# Patient Record
Sex: FEMALE | Race: WHITE | NOT HISPANIC OR LATINO | URBAN - METROPOLITAN AREA
[De-identification: names, ages, dates, MRNs, and addresses within clinical notes are randomized per-mention and may not be internally consistent; named-entity substitution may affect disease eponyms.]

---

## 2017-01-14 ENCOUNTER — INPATIENT (INPATIENT)
Facility: HOSPITAL | Age: 72
LOS: 3 days | Discharge: HOME | DRG: 291 | End: 2017-01-18
Attending: FAMILY MEDICINE | Admitting: HOSPITALIST
Payer: MEDICARE

## 2017-01-14 VITALS
SYSTOLIC BLOOD PRESSURE: 170 MMHG | RESPIRATION RATE: 18 BRPM | OXYGEN SATURATION: 95 % | DIASTOLIC BLOOD PRESSURE: 80 MMHG | TEMPERATURE: 98 F | HEART RATE: 64 BPM

## 2017-01-14 DIAGNOSIS — I50.9 HEART FAILURE, UNSPECIFIED: ICD-10-CM

## 2017-01-14 DIAGNOSIS — Z95.810 PRESENCE OF AUTOMATIC (IMPLANTABLE) CARDIAC DEFIBRILLATOR: Chronic | ICD-10-CM

## 2017-01-14 DIAGNOSIS — Z98.89 OTHER SPECIFIED POSTPROCEDURAL STATES: Chronic | ICD-10-CM

## 2017-01-14 LAB
ALBUMIN SERPL ELPH-MCNC: 4 G/DL — SIGNIFICANT CHANGE UP (ref 3.3–5.2)
ALP SERPL-CCNC: 58 U/L — SIGNIFICANT CHANGE UP (ref 40–120)
ALT FLD-CCNC: 19 U/L — SIGNIFICANT CHANGE UP
ANION GAP SERPL CALC-SCNC: 14 MMOL/L — SIGNIFICANT CHANGE UP (ref 5–17)
APTT BLD: 32 SEC — SIGNIFICANT CHANGE UP (ref 27.5–37.4)
AST SERPL-CCNC: 23 U/L — SIGNIFICANT CHANGE UP
BASOPHILS # BLD AUTO: 0.1 K/UL — SIGNIFICANT CHANGE UP (ref 0–0.2)
BASOPHILS NFR BLD AUTO: 0.9 % — SIGNIFICANT CHANGE UP (ref 0–2)
BILIRUB SERPL-MCNC: 0.5 MG/DL — SIGNIFICANT CHANGE UP (ref 0.4–2)
BUN SERPL-MCNC: 17 MG/DL — SIGNIFICANT CHANGE UP (ref 8–20)
CALCIUM SERPL-MCNC: 9.2 MG/DL — SIGNIFICANT CHANGE UP (ref 8.6–10.2)
CHLORIDE SERPL-SCNC: 102 MMOL/L — SIGNIFICANT CHANGE UP (ref 98–107)
CK SERPL-CCNC: 75 U/L — SIGNIFICANT CHANGE UP (ref 25–170)
CO2 SERPL-SCNC: 26 MMOL/L — SIGNIFICANT CHANGE UP (ref 22–29)
CREAT SERPL-MCNC: 0.98 MG/DL — SIGNIFICANT CHANGE UP (ref 0.5–1.3)
EOSINOPHIL # BLD AUTO: 0 K/UL — SIGNIFICANT CHANGE UP (ref 0–0.5)
EOSINOPHIL NFR BLD AUTO: 0.6 % — SIGNIFICANT CHANGE UP (ref 0–6)
GLUCOSE SERPL-MCNC: 99 MG/DL — SIGNIFICANT CHANGE UP (ref 70–115)
HCT VFR BLD CALC: 27.6 % — LOW (ref 37–47)
HGB BLD-MCNC: 8.6 G/DL — LOW (ref 12–16)
INR BLD: 1.1 RATIO — SIGNIFICANT CHANGE UP (ref 0.88–1.16)
LYMPHOCYTES # BLD AUTO: 2.3 K/UL — SIGNIFICANT CHANGE UP (ref 1–4.8)
LYMPHOCYTES # BLD AUTO: 36.1 % — SIGNIFICANT CHANGE UP (ref 20–55)
MCHC RBC-ENTMCNC: 24.4 PG — LOW (ref 27–31)
MCHC RBC-ENTMCNC: 31.2 G/DL — LOW (ref 32–36)
MCV RBC AUTO: 78.4 FL — LOW (ref 81–99)
MONOCYTES # BLD AUTO: 1.1 K/UL — HIGH (ref 0–0.8)
MONOCYTES NFR BLD AUTO: 17 % — HIGH (ref 3–10)
NEUTROPHILS # BLD AUTO: 2.9 K/UL — SIGNIFICANT CHANGE UP (ref 1.8–8)
NEUTROPHILS NFR BLD AUTO: 45.2 % — SIGNIFICANT CHANGE UP (ref 37–73)
NT-PROBNP SERPL-SCNC: 2702 PG/ML — HIGH (ref 0–300)
PLATELET # BLD AUTO: 396 K/UL — SIGNIFICANT CHANGE UP (ref 150–400)
POTASSIUM SERPL-MCNC: 4 MMOL/L — SIGNIFICANT CHANGE UP (ref 3.5–5.3)
POTASSIUM SERPL-SCNC: 4 MMOL/L — SIGNIFICANT CHANGE UP (ref 3.5–5.3)
PROT SERPL-MCNC: 6.9 G/DL — SIGNIFICANT CHANGE UP (ref 6.6–8.7)
PROTHROM AB SERPL-ACNC: 12.1 SEC — SIGNIFICANT CHANGE UP (ref 10–13.1)
RBC # BLD: 3.52 M/UL — LOW (ref 4.4–5.2)
RBC # FLD: 17.3 % — HIGH (ref 11–15.6)
SODIUM SERPL-SCNC: 142 MMOL/L — SIGNIFICANT CHANGE UP (ref 135–145)
TROPONIN T SERPL-MCNC: <0.01 NG/ML — SIGNIFICANT CHANGE UP (ref 0–0.06)
WBC # BLD: 6.48 K/UL — SIGNIFICANT CHANGE UP (ref 4.8–10.8)
WBC # FLD AUTO: 6.48 K/UL — SIGNIFICANT CHANGE UP (ref 4.8–10.8)

## 2017-01-14 PROCEDURE — 93010 ELECTROCARDIOGRAM REPORT: CPT

## 2017-01-14 PROCEDURE — 99285 EMERGENCY DEPT VISIT HI MDM: CPT

## 2017-01-14 PROCEDURE — 99223 1ST HOSP IP/OBS HIGH 75: CPT

## 2017-01-14 RX ORDER — NITROGLYCERIN 6.5 MG
1 CAPSULE, EXTENDED RELEASE ORAL ONCE
Qty: 0 | Refills: 0 | Status: COMPLETED | OUTPATIENT
Start: 2017-01-14 | End: 2017-01-14

## 2017-01-14 RX ORDER — FUROSEMIDE 40 MG
80 TABLET ORAL ONCE
Qty: 0 | Refills: 0 | Status: COMPLETED | OUTPATIENT
Start: 2017-01-14 | End: 2017-01-14

## 2017-01-14 RX ORDER — SODIUM CHLORIDE 9 MG/ML
3 INJECTION INTRAMUSCULAR; INTRAVENOUS; SUBCUTANEOUS ONCE
Qty: 0 | Refills: 0 | Status: COMPLETED | OUTPATIENT
Start: 2017-01-14 | End: 2017-01-14

## 2017-01-14 RX ORDER — ACETAMINOPHEN 500 MG
650 TABLET ORAL ONCE
Qty: 0 | Refills: 0 | Status: COMPLETED | OUTPATIENT
Start: 2017-01-14 | End: 2017-01-14

## 2017-01-14 RX ADMIN — Medication 650 MILLIGRAM(S): at 17:16

## 2017-01-14 RX ADMIN — Medication 80 MILLIGRAM(S): at 19:21

## 2017-01-14 RX ADMIN — Medication 1 INCH(S): at 17:17

## 2017-01-14 RX ADMIN — SODIUM CHLORIDE 3 MILLILITER(S): 9 INJECTION INTRAMUSCULAR; INTRAVENOUS; SUBCUTANEOUS at 16:45

## 2017-01-14 NOTE — ED PROVIDER NOTE - PMH
Afib    Anemia    Cardiomyopathy    Chronic back pain    GI bleed not requiring more than 4 units of blood in 24 hours, ICU, or surgery    HLD (hyperlipidemia)    HTN (hypertension)    Hypothyroid    MI (myocardial infarction)  3 times (2000,2002)  Raynaud's disease    Scleroderma    Sleep apnea    Ventricular tachycardia

## 2017-01-14 NOTE — ED PROVIDER NOTE - DIAGNOSTIC INTERPRETATION
Outside study from urgent care brought and reviewed by ED physician. No report. Lungs are clear, PM in place, no pneumothorax, no conslidation, mild vascular congestion is present

## 2017-01-14 NOTE — H&P ADULT. - ASSESSMENT
Becky Be [id number] pmd Dr Bass Cardio consult Dr Esteves  70 y/o F w/hx NICM, CHF, s/p AICD, afib, scleroderma, OA, hypothyroidism, GERD. Presents to the ED c/o of sob that started 4 days ago. Also noticed increased leg swelling, and gain weight. approx 13 lbs. Pt states that 2 weeks ago, her lasix med was cut down from 80 to 40 mg daily. Also c/o of cp, precordial, no radiation, associated w/ exertion, no atten f. Denies palpitations, cough, n/v, abd pain, loc or any further complaints. Exam: b/l jvd, hepatojug reflex pos, b/l rales, pitting edema. Admittted for CHF exacer/cp. Started in iv lasix, f/u CE, echo, serial ekg's. Becky Be [id number] pmd Dr Bass Cardio consult Dr Esteves  72 y/o F w/hx NICM, CHF, s/p AICD, afib, anemia(baseline 9), hx gastric bypass, gi bleeding,  scleroderma, OA, hypothyroidism, GERD. Presents to the ED c/o of sob that started 4 days ago. Also noticed increased leg swelling, and gain weight. approx 13 lbs. Pt states that 2 weeks ago, her lasix med was cut down from 80 to 40 mg daily. Also c/o of cp, precordial, no radiation, associated w/ exertion, no atten f. Denies palpitations, cough, n/v, abd pain, loc or any further complaints. Exam: b/l jvd, hepatojug reflex pos, b/l rales, pitting edema. Admittted for CHF exacer/cp. Started in iv lasix, f/u CE, echo, serial ekg's.

## 2017-01-14 NOTE — H&P ADULT. - PSH
AICD (automatic cardioverter/defibrillator) present  [employer] (2006)  Bariatric surgery status  gastric bypass 2001  Hernia  , Lariat procedure (2012)  History of breast surgery  "Lift"  Spinal cord stimulator status  [employer] scientific (2014)

## 2017-01-14 NOTE — ED PROVIDER NOTE - PSH
AICD (automatic cardioverter/defibrillator) present  [employer] (2006)  Bariatric surgery status  gastric bypass 2001  Hernia  , Lariat procedure (2012)  History of breast surgery  "Lift"  Spinal cord stimulator status  [employer] scientific (2014)

## 2017-01-14 NOTE — ED PROVIDER NOTE - PROGRESS NOTE DETAILS
Feels beter. D/w [place] [facility] who recommends admission for diuresis. Will call hospitalist No call back from hospitalist, will call again Have called hospitalist multiple times, saying should be admitted to resident service btu patient is not family medicine clinic patient (is unattached) and resident service is capped. Will contact hospitalist one more time.

## 2017-01-14 NOTE — ED PROVIDER NOTE - OBJECTIVE STATEMENT
71F w/Hx of Scleroderma, COPD, CHF, Afib not on AC or antiplatelet agents 2/2 GIB presents for SOB x2-3d with orthopnea and PNDA. No pain. No fevers, no chills. Went to urgent care given back to back nebs without improvement. +SUAREZ. Of note, recently taken off ramipril and decrased coreg in order to uptitrate her calcium channel blockers to improve ranaud's symptoms. No pain of any sort. +Symmetrical bilateral LE swellign in the past week. Has been told strong contraindications to anticoagulants and antiplatelet agents 2/2 GIBs.    Cardiologist: Hope Trimble of [facility]

## 2017-01-14 NOTE — ED ADULT NURSE NOTE - OBJECTIVE STATEMENT
late entery : Pt presented to Ed c/o increased SOB started a week ago , denies chest pain , recent weight gain over a week ,

## 2017-01-14 NOTE — H&P ADULT. - PROBLEM SELECTOR PLAN 3
hx gastric bypass, thereafter hx of gi bleeding.  pt baseline is 9..had colonscopy 5 yrs ago  (wnl as per pt), and endoscopy.  f/u h/h

## 2017-01-14 NOTE — H&P ADULT. - RS GEN PE MLT RESP DETAILS PC
no rhonchi/no chest wall tenderness/no intercostal retractions/airway patent/breath sounds equal/rales

## 2017-01-14 NOTE — ED PROVIDER NOTE - MEDICAL DECISION MAKING DETAILS
Likely CHF exacerbation, will give nitro, lasxi, suspect will require admission but will discuss care with [facility] cardiology. Suspect may have decompensated by changing beta blockers and ACEi. Given unresposniveness to nebs, low suspicion for acute CHF but will likely need her normal maint treatments.

## 2017-01-14 NOTE — H&P ADULT. - HISTORY OF PRESENT ILLNESS
72 y/o F w/hx NICM, CHF, s/p AICD, afib, scleroderma, OA, hypothyroidism, GERD. Presents to the ED c/o of sob that started 4 days ago. Also noticed increased leg swelling, and gain weight. approx 13 lbs. Pt states that 2 weeks ago, her lasix med was cut down from 80 to 40 mg daily. Also c/o of cp, precordial, no radiation, associated w/ exertion, no atten f. Denies palpitations, cough, n/v, abd pain, loc or any further complaints. 70 y/o F w/hx NICM, CHF, s/p AICD, afib, anemia(baseline 9), hx gastric bypass, gi bleeding,  scleroderma, OA, hypothyroidism, GERD. Presents to the ED c/o of sob that started 4 days ago. Also noticed increased leg swelling, and gain weight. approx 13 lbs. Pt states that 2 weeks ago, her lasix med was cut down from 80 to 40 mg daily. Also c/o of cp, precordial, no radiation, associated w/ exertion, no atten f. Denies palpitations, cough, n/v, abd pain, loc or any further complaints.

## 2017-01-14 NOTE — ED PROVIDER NOTE - PHYSICAL EXAMINATION
GEN: AOx4, alert, mild respiratory distress, no home O2 but on 2L nasal canula  HEENT: NCAT, anicteric sclerae , MMM, marked JVD to mastoids bilatearlly, neck supple  LUNGS: bibasilar crackles, mild tachypnea, speaking in full sentences, no accessory muscle use, unable to breath well when laying flat  CV: normal rate, irregular rhythm, 2+ distal pulses  ABD: soft, nontender  : no CVAT  MSK: b/l, symmetric 2+ calf edema, nontender  NEURO: normal mentation  PSYCH: appropriate affect

## 2017-01-15 ENCOUNTER — TRANSCRIPTION ENCOUNTER (OUTPATIENT)
Age: 72
End: 2017-01-15

## 2017-01-15 DIAGNOSIS — I50.9 HEART FAILURE, UNSPECIFIED: ICD-10-CM

## 2017-01-15 DIAGNOSIS — I21.4 NON-ST ELEVATION (NSTEMI) MYOCARDIAL INFARCTION: ICD-10-CM

## 2017-01-15 DIAGNOSIS — E03.9 HYPOTHYROIDISM, UNSPECIFIED: ICD-10-CM

## 2017-01-15 DIAGNOSIS — I48.91 UNSPECIFIED ATRIAL FIBRILLATION: ICD-10-CM

## 2017-01-15 DIAGNOSIS — I48.2 CHRONIC ATRIAL FIBRILLATION: ICD-10-CM

## 2017-01-15 DIAGNOSIS — K21.9 GASTRO-ESOPHAGEAL REFLUX DISEASE WITHOUT ESOPHAGITIS: ICD-10-CM

## 2017-01-15 DIAGNOSIS — I10 ESSENTIAL (PRIMARY) HYPERTENSION: ICD-10-CM

## 2017-01-15 DIAGNOSIS — M54.9 DORSALGIA, UNSPECIFIED: ICD-10-CM

## 2017-01-15 DIAGNOSIS — M34.9 SYSTEMIC SCLEROSIS, UNSPECIFIED: ICD-10-CM

## 2017-01-15 DIAGNOSIS — I73.00 RAYNAUD'S SYNDROME WITHOUT GANGRENE: ICD-10-CM

## 2017-01-15 DIAGNOSIS — R07.9 CHEST PAIN, UNSPECIFIED: ICD-10-CM

## 2017-01-15 DIAGNOSIS — E78.5 HYPERLIPIDEMIA, UNSPECIFIED: ICD-10-CM

## 2017-01-15 DIAGNOSIS — D50.9 IRON DEFICIENCY ANEMIA, UNSPECIFIED: ICD-10-CM

## 2017-01-15 PROCEDURE — 99233 SBSQ HOSP IP/OBS HIGH 50: CPT

## 2017-01-15 PROCEDURE — 93010 ELECTROCARDIOGRAM REPORT: CPT

## 2017-01-15 PROCEDURE — 93306 TTE W/DOPPLER COMPLETE: CPT | Mod: 26

## 2017-01-15 PROCEDURE — 71010: CPT | Mod: 26

## 2017-01-15 RX ORDER — MORPHINE SULFATE 50 MG/1
2 CAPSULE, EXTENDED RELEASE ORAL EVERY 6 HOURS
Qty: 0 | Refills: 0 | Status: DISCONTINUED | OUTPATIENT
Start: 2017-01-15 | End: 2017-01-18

## 2017-01-15 RX ORDER — FUROSEMIDE 40 MG
60 TABLET ORAL
Qty: 0 | Refills: 0 | COMMUNITY

## 2017-01-15 RX ORDER — CARVEDILOL PHOSPHATE 80 MG/1
6.25 CAPSULE, EXTENDED RELEASE ORAL EVERY 12 HOURS
Qty: 0 | Refills: 0 | Status: DISCONTINUED | OUTPATIENT
Start: 2017-01-15 | End: 2017-01-18

## 2017-01-15 RX ORDER — DULOXETINE HYDROCHLORIDE 30 MG/1
60 CAPSULE, DELAYED RELEASE ORAL DAILY
Qty: 0 | Refills: 0 | Status: DISCONTINUED | OUTPATIENT
Start: 2017-01-15 | End: 2017-01-18

## 2017-01-15 RX ORDER — NITROGLYCERIN 6.5 MG
0.4 CAPSULE, EXTENDED RELEASE ORAL
Qty: 0 | Refills: 0 | Status: DISCONTINUED | OUTPATIENT
Start: 2017-01-15 | End: 2017-01-18

## 2017-01-15 RX ORDER — PANTOPRAZOLE SODIUM 20 MG/1
40 TABLET, DELAYED RELEASE ORAL
Qty: 0 | Refills: 0 | Status: DISCONTINUED | OUTPATIENT
Start: 2017-01-15 | End: 2017-01-18

## 2017-01-15 RX ORDER — DOCUSATE SODIUM 100 MG
200 CAPSULE ORAL DAILY
Qty: 0 | Refills: 0 | Status: DISCONTINUED | OUTPATIENT
Start: 2017-01-15 | End: 2017-01-18

## 2017-01-15 RX ORDER — FUROSEMIDE 40 MG
40 TABLET ORAL EVERY 12 HOURS
Qty: 0 | Refills: 0 | Status: DISCONTINUED | OUTPATIENT
Start: 2017-01-15 | End: 2017-01-18

## 2017-01-15 RX ORDER — FUROSEMIDE 40 MG
0 TABLET ORAL
Qty: 0 | Refills: 0 | COMMUNITY

## 2017-01-15 RX ORDER — CARVEDILOL PHOSPHATE 80 MG/1
6.25 CAPSULE, EXTENDED RELEASE ORAL
Qty: 0 | Refills: 0 | COMMUNITY

## 2017-01-15 RX ORDER — LEVOTHYROXINE SODIUM 125 MCG
100 TABLET ORAL DAILY
Qty: 0 | Refills: 0 | Status: DISCONTINUED | OUTPATIENT
Start: 2017-01-15 | End: 2017-01-18

## 2017-01-15 RX ORDER — ACETAMINOPHEN 500 MG
650 TABLET ORAL ONCE
Qty: 0 | Refills: 0 | Status: COMPLETED | OUTPATIENT
Start: 2017-01-15 | End: 2017-01-15

## 2017-01-15 RX ORDER — ATORVASTATIN CALCIUM 80 MG/1
10 TABLET, FILM COATED ORAL AT BEDTIME
Qty: 0 | Refills: 0 | Status: DISCONTINUED | OUTPATIENT
Start: 2017-01-15 | End: 2017-01-16

## 2017-01-15 RX ORDER — AMLODIPINE BESYLATE 2.5 MG/1
5 TABLET ORAL DAILY
Qty: 0 | Refills: 0 | Status: DISCONTINUED | OUTPATIENT
Start: 2017-01-15 | End: 2017-01-18

## 2017-01-15 RX ADMIN — Medication 40 MILLIGRAM(S): at 17:56

## 2017-01-15 RX ADMIN — Medication 1 INCH(S): at 04:34

## 2017-01-15 RX ADMIN — Medication 40 MILLIGRAM(S): at 05:21

## 2017-01-15 RX ADMIN — PANTOPRAZOLE SODIUM 40 MILLIGRAM(S): 20 TABLET, DELAYED RELEASE ORAL at 05:21

## 2017-01-15 RX ADMIN — Medication 650 MILLIGRAM(S): at 04:34

## 2017-01-15 RX ADMIN — Medication 650 MILLIGRAM(S): at 02:54

## 2017-01-15 RX ADMIN — CARVEDILOL PHOSPHATE 6.25 MILLIGRAM(S): 80 CAPSULE, EXTENDED RELEASE ORAL at 17:56

## 2017-01-15 RX ADMIN — Medication 100 MICROGRAM(S): at 05:21

## 2017-01-15 RX ADMIN — AMLODIPINE BESYLATE 5 MILLIGRAM(S): 2.5 TABLET ORAL at 05:21

## 2017-01-15 RX ADMIN — CARVEDILOL PHOSPHATE 6.25 MILLIGRAM(S): 80 CAPSULE, EXTENDED RELEASE ORAL at 05:21

## 2017-01-15 RX ADMIN — DULOXETINE HYDROCHLORIDE 60 MILLIGRAM(S): 30 CAPSULE, DELAYED RELEASE ORAL at 12:29

## 2017-01-15 RX ADMIN — ATORVASTATIN CALCIUM 10 MILLIGRAM(S): 80 TABLET, FILM COATED ORAL at 21:54

## 2017-01-15 RX ADMIN — Medication 200 MILLIGRAM(S): at 21:54

## 2017-01-15 NOTE — PATIENT PROFILE ADULT. - FALL HARM RISK
coagulation(Bleeding disorder R/T clinical cond/anti-coags)/bones(Osteoporosis,prev fx,steroid use,metastatic bone ca

## 2017-01-15 NOTE — DISCHARGE NOTE ADULT - PATIENT PORTAL LINK FT
“You can access the Follow[facility] Patient Portal, offered by [facility], by registering with the following website: [email or web address]”

## 2017-01-15 NOTE — CONSULT NOTE ADULT - ASSESSMENT
HPI:  MARGARITO FRIEND is an 71y Female with Hx of  NICMP s/p AICD 2006, PAF last LVEF 60% who had been doing well recently up to the last few weeks when her Lasix and Coreg dose had been decreased due to low BP c/o SOB, SUAREZ and LE edema for the last few days that got worse yesterday. Found with evidence of Acute on chronic CHF     Telemetry monitoring  Serial cardiac markers and EKgs  2DEchocardiogram  IV Diuresis, lytes replacement  Continue remaining cardiac therapy    BRIANNA LAGUNA MD, FACC, MIGUEL HPI:  MARGARITO FRIEND is an 71y Female with Hx of  NICMP s/p AICD 2006, PAF s/p Lariat last LVEF 60% who had been doing well recently up to the last few weeks when her Lasix and Coreg dose had been decreased due to low BP c/o SOB, SUAREZ and LE edema for the last few days that got worse yesterday. Found with evidence of Acute on chronic CHF     Telemetry monitoring  Serial cardiac markers and EKgs  2DEchocardiogram  IV Diuresis, lytes replacement  Continue remaining cardiac therapy    BRIANNA LAGUNA MD, FACC, [facility]

## 2017-01-15 NOTE — DISCHARGE NOTE ADULT - CARE PROVIDER_API CALL
Kathia Bass), Family Medicine  [address]  [place] [address]  Phone: [phone]  Fax: [phone]    Hope Banks), Cardiovascular Disease; Internal Medicine  [address] [place] [id number]  Phone: [phone]  Fax: [phone] Kathia Bass), Family Medicine  [address]  [place] [address]  Phone: [phone]  Fax: [phone]    Hope Banks), Cardiovascular Disease; Internal Medicine  [address] [place] [id number]  Phone: [phone]  Fax: [phone]    Yamilex Zarate), Internal Medicine; Nephrology  [address] [place] [address]  Phone: [phone]  Fax: [phone]

## 2017-01-15 NOTE — CONSULT NOTE ADULT - SUBJECTIVE AND OBJECTIVE BOX
[place] CARDIOV[facility]ULAR [facility], THE HEART CENTER                                   [address] [place] [address]                                                      PHONE: [phone]                                                         FAX: [phone]  [email or web address]  ---------------------------------------------------------------------------------------------------------------------------------    Reason for Consult: CHF    HPI:  MARGARITO FRIEND is an 71y Female with Hx of  NICMP s/p AICD 2006, PAF last LVEF 60% who had been doing well recently up to the last few weeks when her Lasix and Coreg dose had been decreased due to low BP c/o SOB, SUAREZ and LE edema for the last few days that got worse yesterday. Found with evidence of Acute on chronic CHF     PAST MEDICAL & SURGICAL HISTORY:  Anemia  Ventricular tachycardia  Cardiomyopathy  Scleroderma  Chronic back pain  Sleep apnea  Raynaud&#x27;s disease  GI bleed not requiring more than 4 units of blood in 24 hours, ICU, or surgery  Hypothyroid  Afib  MI (myocardial infarction): 3 times (2000,2002)  HLD (hyperlipidemia)  HTN (hypertension)  History of breast surgery: &quot;Lift&quot;  Spinal cord stimulator status: [employer] scientific (2014)  AICD (automatic cardioverter/defibrillator) present: [employer] (2006)  Hernia: , Lariat procedure (2012)  Bariatric surgery status: gastric bypass 2001      Keflex (Unknown)      MEDICATIONS  (STANDING):  DULoxetine 60milliGRAM(s) Oral daily  atorvastatin 10milliGRAM(s) Oral at bedtime  carvedilol 6.25milliGRAM(s) Oral every 12 hours  amLODIPine   Tablet 5milliGRAM(s) Oral daily  pantoprazole    Tablet 40milliGRAM(s) Oral before breakfast  levothyroxine 100MICROGram(s) Oral daily  furosemide   Injectable 40milliGRAM(s) IV Push every 12 hours    MEDICATIONS  (PRN):  docusate sodium 200milliGRAM(s) Oral daily PRN Constipation  morphine  - Injectable 2milliGRAM(s) IV Push every 6 hours PRN chest pain  nitroglycerin     SubLingual 0.4milliGRAM(s) SubLingual every 5 minutes PRN Chest Pain      Social History:  Cigarettes:                    Alchohol:                 Illicit Drug Abuse:      REVIEW OF SYSTEMS:    Constitutional: No fever, weight loss or fatigue  Eyes: No eye pain, visual disturbances, or discharge  ENMT:  No difficulty hearing, tinnitus, vertigo; No sinus or throat pain  Neck: No pain or stiffness  Respiratory: No cough, wheezing, chills or hemoptysis  Cardiovascular: No chest pain, palpitations, shortness of breath, dizziness or leg swelling  Gastrointestinal: No abdominal or epigastric pain. No nausea, vomiting or hematemesis; No diarrhea or constipation. No melena or hematochezia.  Genitourinary: No dysuria, frequency, hematuria or incontinence  Rectal: No pain, hemorrhoids or incontinence  Neurological: No headaches, memory loss, loss of strength, numbness or tremors  Skin: No itching, burning, rashes or lesions   Lymph Nodes: No enlarged glands  Endocrine: No heat or cold intolerance; No hair loss  Musculoskeletal: No joint pain or swelling; No muscle, back or extremity pain  Psychiatric: No depression, anxiety, mood swings or difficulty sleeping  Heme/Lymph: No easy bruising or bleeding gums  Allergy and Immunologic: No hives or eczema    Vital Signs Last 24 Hrs  T(C): 36.6, Max: 36.7 (01-14 @ 15:12)  T(F): 97.9, Max: 98 (01-14 @ 15:12)  HR: 99 (64 - 99)  BP: 110/70 (105/60 - 170/80)  BP(mean): --  RR: 18 (18 - 19)  SpO2: 98% (94% - 100%)  ICU Vital Signs Last 24 Hrs  MARGARITO FRINED  I&O's Detail    I&O's Summary    Drug Dosing Weight  MARGARITO FRIEND      PHYSICAL EXAM:  General: Appears well developed, well nourished alert and cooperative.  HEENT: Head; normocephalic, atraumatic.  Eyes: Pupils reactive, cornea wnl.  Neck: Supple, no nodes adenopathy, no NVD or carotid bruit or thyromegaly.  CARDIOVASCULAR: Normal S1 and S2, No murmur, rub, gallop or lift.   LUNGS: No rales, rhonchi or wheeze. Normal breath sounds bilaterally.  ABDOMEN: Soft, nontender without mass or organomegaly. bowel sounds normoactive.  EXTREMITIES: No clubbing, cyanosis or edema. Distal pulses wnl.   SKIN: warm and dry with normal turgor.  NEURO: Alert/oriented x 3/normal motor exam. No pathologic reflexes.    PSYCH: normal affect.        LABS:                        8.6    6.48  )-----------( 396      ( 14 Jan 2017 16:20 )             27.6     14 Jan 2017 16:20    142    |  102    |  17.0   ----------------------------<  99     4.0     |  26.0   |  0.98     Ca    9.2        14 Jan 2017 16:20    TPro  6.9    /  Alb  4.0    /  TBili  0.5    /  DBili  x      /  AST  23     /  ALT  19     /  AlkPhos  58     14 Jan 2017 16:20    MARGARITO MAZZO  CARDIAC MARKERS ( 14 Jan 2017 16:20 )  x     / <0.01 ng/mL / 75 U/L / x     / x          PT/INR - ( 14 Jan 2017 16:20 )   PT: 12.1 sec;   INR: 1.10 ratio         PTT - ( 14 Jan 2017 16:20 )  PTT:32.0 sec      RADIOLOGY & ADDITIONAL STUDIES:    INTERPRETATION OF TELEMETRY (personally reviewed):    ECG:    ECHO:    STRESS TEST:    CARDIAC CATHETERIZATION:    ACTIVE PROBLEMS:  HEALTH ISSUES - PROBLEM Dx:  HTN (hypertension): HTN (hypertension)  GERD (gastroesophageal reflux disease): GERD (gastroesophageal reflux disease)  HLD (hyperlipidemia): HLD (hyperlipidemia)  Afib: Afib  Hypothyroid: Hypothyroid  Chronic back pain: Chronic back pain  Microcytic anemia: Microcytic anemia  Chest pain: Chest pain  Acute on chronic congestive heart failure, unspecified congestive heart failure type: Acute on chronic congestive heart failure, unspecified congestive heart failure type [place] [facility], [facility]                                   [address] [place] [address]                                                      PHONE: [phone]                                                         FAX: [phone]  [email or web address]  ---------------------------------------------------------------------------------------------------------------------------------    Reason for Consult: CHF    HPI:  MARGARITO FRIEND is an 71y Female with Hx of  NICMP s/p AICD 2006, PAF last LVEF 60% who had been doing well recently up to the last few weeks when her Lasix and Coreg dose had been decreased due to low BP c/o SOB, SUAREZ and LE edema for the last few days that got worse yesterday. Found with evidence of Acute on chronic CHF     PAST MEDICAL & SURGICAL HISTORY:  Anemia  Ventricular tachycardia  Cardiomyopathy  Scleroderma  Chronic back pain  Sleep apnea  Raynaud&#x27;s disease  GI bleed not requiring more than 4 units of blood in 24 hours, ICU, or surgery  Hypothyroid  Afib  MI (myocardial infarction): 3 times (2000,2002)  HLD (hyperlipidemia)  HTN (hypertension)  History of breast surgery: &quot;Lift&quot;  Spinal cord stimulator status: [employer] (2014)  AICD (automatic cardioverter/defibrillator) present: [employer] (2006)  Hernia: , Lariat procedure (2012)  Bariatric surgery status: gastric bypass 2001      Keflex (Unknown)      MEDICATIONS  (STANDING):  DULoxetine 60milliGRAM(s) Oral daily  atorvastatin 10milliGRAM(s) Oral at bedtime  carvedilol 6.25milliGRAM(s) Oral every 12 hours  amLODIPine   Tablet 5milliGRAM(s) Oral daily  pantoprazole    Tablet 40milliGRAM(s) Oral before breakfast  levothyroxine 100MICROGram(s) Oral daily  furosemide   Injectable 40milliGRAM(s) IV Push every 12 hours    MEDICATIONS  (PRN):  docusate sodium 200milliGRAM(s) Oral daily PRN Constipation  morphine  - Injectable 2milliGRAM(s) IV Push every 6 hours PRN chest pain  nitroglycerin     SubLingual 0.4milliGRAM(s) SubLingual every 5 minutes PRN Chest Pain      Social History:  Cigarettes:                    Alchohol:                 Illicit Drug Abuse:      REVIEW OF SYSTEMS:    Constitutional: No fever, weight loss or fatigue  Eyes: No eye pain, visual disturbances, or discharge  ENMT:  No difficulty hearing, tinnitus, vertigo; No sinus or throat pain  Neck: No pain or stiffness  Respiratory: No cough, wheezing, chills or hemoptysis  Cardiovascular: No chest pain, palpitations, shortness of breath, dizziness or leg swelling  Gastrointestinal: No abdominal or epigastric pain. No nausea, vomiting or hematemesis; No diarrhea or constipation. No melena or hematochezia.  Genitourinary: No dysuria, frequency, hematuria or incontinence  Rectal: No pain, hemorrhoids or incontinence  Neurological: No headaches, memory loss, loss of strength, numbness or tremors  Skin: No itching, burning, rashes or lesions   Lymph Nodes: No enlarged glands  Endocrine: No heat or cold intolerance; No hair loss  Musculoskeletal: No joint pain or swelling; No muscle, back or extremity pain  Psychiatric: No depression, anxiety, mood swings or difficulty sleeping  Heme/Lymph: No easy bruising or bleeding gums  Allergy and Immunologic: No hives or eczema    Vital Signs Last 24 Hrs  T(C): 36.6, Max: 36.7 (01-14 @ 15:12)  T(F): 97.9, Max: 98 (01-14 @ 15:12)  HR: 99 (64 - 99)  BP: 110/70 (105/60 - 170/80)  BP(mean): --  RR: 18 (18 - 19)  SpO2: 98% (94% - 100%)  ICU Vital Signs Last 24 Hrs  MARGARITO FRIEND  I&O's Detail    I&O's Summary    Drug Dosing Weight  MARGARITO FRIEND      PHYSICAL EXAM:  General: Appears well developed, well nourished alert and cooperative.  HEENT: Head; normocephalic, atraumatic.  Eyes: Pupils reactive, cornea wnl.  Neck: Supple, no nodes adenopathy, no NVD or carotid bruit or thyromegaly.  CARDIOVASCULAR: Normal S1 and S2, No murmur, rub, gallop or lift.   LUNGS: No rales, rhonchi or wheeze. Normal breath sounds bilaterally.  ABDOMEN: Soft, nontender without mass or organomegaly. bowel sounds normoactive.  EXTREMITIES: No clubbing, cyanosis or edema. Distal pulses wnl.   SKIN: warm and dry with normal turgor.  NEURO: Alert/oriented x 3/normal motor exam. No pathologic reflexes.    PSYCH: normal affect.        LABS:                        8.6    6.48  )-----------( 396      ( 14 Jan 2017 16:20 )             27.6     14 Jan 2017 16:20    142    |  102    |  17.0   ----------------------------<  99     4.0     |  26.0   |  0.98     Ca    9.2        14 Jan 2017 16:20    TPro  6.9    /  Alb  4.0    /  TBili  0.5    /  DBili  x      /  AST  23     /  ALT  19     /  AlkPhos  58     14 Jan 2017 16:20    MARGARITO MAZZO  CARDIAC MARKERS ( 14 Jan 2017 16:20 )  x     / <0.01 ng/mL / 75 U/L / x     / x          PT/INR - ( 14 Jan 2017 16:20 )   PT: 12.1 sec;   INR: 1.10 ratio         PTT - ( 14 Jan 2017 16:20 )  PTT:32.0 sec      RADIOLOGY & ADDITIONAL STUDIES:    INTERPRETATION OF TELEMETRY (personally reviewed):    ECG: Afib    ECHO:    STRESS TEST:    CARDIAC CATHETERIZATION:    ACTIVE PROBLEMS:  HEALTH ISSUES - PROBLEM Dx:  HTN (hypertension): HTN (hypertension)  GERD (gastroesophageal reflux disease): GERD (gastroesophageal reflux disease)  HLD (hyperlipidemia): HLD (hyperlipidemia)  Afib: Afib  Hypothyroid: Hypothyroid  Chronic back pain: Chronic back pain  Microcytic anemia: Microcytic anemia  Chest pain: Chest pain  Acute on chronic congestive heart failure, unspecified congestive heart failure type: Acute on chronic congestive heart failure, unspecified congestive heart failure type

## 2017-01-15 NOTE — DISCHARGE NOTE ADULT - CARE PLAN
Principal Discharge DX:	Acute on chronic congestive heart failure, unspecified congestive heart failure type  Goal:	take all meds as directed  Instructions for follow-up, activity and diet:	low salt diet   follow up with cardio  Secondary Diagnosis:	Dilated cardiomyopathy  Secondary Diagnosis:	Chronic atrial fibrillation  Secondary Diagnosis:	Gastroesophageal reflux disease without esophagitis  Secondary Diagnosis:	Pure hypercholesterolemia  Secondary Diagnosis:	Essential hypertension  Secondary Diagnosis:	Microcytic anemia

## 2017-01-15 NOTE — DISCHARGE NOTE ADULT - PROVIDER TOKENS
TOKEN:'[id number]',TOKEN:'[id number]' TOKEN:'[id number]:'[id number]',TOKEN:'[id number]'

## 2017-01-15 NOTE — DISCHARGE NOTE ADULT - NS AS DC HF EDUCATION INSTRUCTIONS
Call Primary Care Provider for follow-up after discharge/Activities as tolerated/Monitor Weight Daily/Low salt diet/Report weight gain of 2 or more pounds in one day or 3 or more pounds in one week, worsening shortness of breath, fatigue, weakness, increased swelling of hands and feet to primary care provider

## 2017-01-15 NOTE — DISCHARGE NOTE ADULT - CARE PROVIDERS DIRECT ADDRESSES
,[email or web address],[email or web address]

## 2017-01-15 NOTE — DISCHARGE NOTE ADULT - HOSPITAL COURSE
70 y/o F w/hx NICM, CHF, s/p AICD, afib, anemia(baseline 9), hx gastric bypass, gi bleeding,  scleroderma, OA, hypothyroidism, GERD. Presents to the ED c/o of sob that started 4 days ago. Also noticed increased leg swelling, and gain weight. approx 13 lbs. Pt states that 2 weeks ago, her lasix med was cut down from 80 to 40 mg daily. Also c/o of cp, precordial, no radiation, associated w/ exertion, no atten f. Denies palpitations, cough, n/v, abd pain, loc or any further complaints.  lasix 40mg bid given  cardio called  ECHO done 70 y/o F w/hx NICM, CHF, s/p AICD, afib, anemia(baseline 9), hx gastric bypass, gi bleeding,  scleroderma, OA, hypothyroidism, GERD. Presents to the ED c/o of sob that started 4 days ago. Also noticed increased leg swelling, and gain weight. approx 13 lbs. Pt states that 2 weeks ago, her lasix med was cut down from 80 to 40 mg daily. Also c/o of cp, precordial, no radiation, associated w/ exertion, no atten f. Denies palpitations, cough, n/v, abd pain, loc or any further complaints.  lasix 40mg bid given  cardio called  ECHO done  pulmonary consult called for SOB + scleroderma as well as COPD.  pt was placed on spiriva and will f/u as outpt

## 2017-01-15 NOTE — DISCHARGE NOTE ADULT - SECONDARY DIAGNOSIS.
Dilated cardiomyopathy Chronic atrial fibrillation Gastroesophageal reflux disease without esophagitis Pure hypercholesterolemia Essential hypertension Microcytic anemia

## 2017-01-15 NOTE — PROGRESS NOTE ADULT - SUBJECTIVE AND OBJECTIVE BOX
Patient is a 71y old  Female who presents with a chief complaint of sob, leg swelling. doing better today  NO SOB, legs no edema  going for ECHO    CARDIOVASCULAR:  carvedilol 6.25milliGRAM(s) Oral every 12 hours  amLODIPine   Tablet 5milliGRAM(s) Oral daily  furosemide   Injectable 40milliGRAM(s) IV Push every 12 hours  nitroglycerin     SubLingual 0.4milliGRAM(s) SubLingual every 5 minutes PRN      NEUROLOGIC:  DULoxetine 60milliGRAM(s) Oral daily  morphine  - Injectable 2milliGRAM(s) IV Push every 6 hours PRN      GATROINTESTINAL:  docusate sodium 200milliGRAM(s) Oral daily PRN  pantoprazole    Tablet 40milliGRAM(s) Oral before breakfast      ENDO/METABOLIC:  atorvastatin 10milliGRAM(s) Oral at bedtime  levothyroxine 100MICROGram(s) Oral daily      Allergies    Keflex (Unknown)      Vital Signs Last 24 Hrs  T(C): 36.4, Max: 36.7 (01-14 @ 15:12)  T(F): 97.5, Max: 98 (01-14 @ 15:12)  HR: 75 (64 - 99)  BP: 106/58 (105/60 - 170/80)  BP(mean): --  RR: 17 (17 - 19)  SpO2: 96% (94% - 100%)      REVIEW OF SYSTEMS:    CONSTITUTIONAL: No fever, weight loss, or fatigue  EYES: No eye pain, visual disturbances, or discharge  ENMT:  No difficulty hearing, tinnitus, vertigo; No sinus or throat pain  NECK: No pain or stiffness  RESPIRATORY: No cough, wheezing, chills or hemoptysis; No shortness of breath  CARDIOVASCULAR: No chest pain, palpitations, dizziness, or leg swelling  GASTROINTESTINAL: No abdominal   NEUROLOGICAL: No headaches, memory loss, loss of strength, numbness, or tremors  SKIN: No itching, burning, rashes, or lesions   LYMPH NODES: No enlarged glands        PHYSICAL EXAM:    GENERAL: NAD, well-groomed, well-developed  HEAD:  Atraumatic, Normocephalic  EYES: EOMI, PERRLA, conjunctiva and sclera clear  ENMT: No tonsillar erythema, exudates, or enlargement; Moist mucous membranes, Good dentition, No lesions  NECK: Supple, No JVD, Normal thyroid  NERVOUS SYSTEM:  Alert & Oriented X3, Good concentration; Motor Strength 5/5 B/L upper and lower extremities; DTRs 2+ intact and symmetric  CHEST/LUNG: Clear to percussion bilaterally; No rales, rhonchi, wheezing, or rubs  HEART: Regular rate and rhythm; No murmurs, rubs, or gallops  ABDOMEN: Soft, Nontender, Nondistended; Bowel sounds present  EXTREMITIES:  2+ Peripheral Pulses      LABS:                        8.6    6.48  )-----------( 396      ( 14 Jan 2017 16:20 )             27.6     CBC Full  -  ( 14 Jan 2017 16:20 )  WBC Count : 6.48 K/uL  Hemoglobin : 8.6 g/dL  Hematocrit : 27.6 %  Platelet Count - Automated : 396 K/uL  Mean Cell Volume : 78.4 fl  Mean Cell Hemoglobin : 24.4 pg  Mean Cell Hemoglobin Concentration : 31.2 g/dL  Auto Neutrophil # : 2.9 K/uL  Auto Lymphocyte # : 2.3 K/uL  Auto Monocyte # : 1.1 K/uL  Auto Eosinophil # : 0.0 K/uL  Auto Basophil # : 0.1 K/uL  Auto Neutrophil % : 45.2 %  Auto Lymphocyte % : 36.1 %  Auto Monocyte % : 17.0 %  Auto Eosinophil % : 0.6 %  Auto Basophil % : 0.9 %    14 Jan 2017 16:20    142    |  102    |  17.0   ----------------------------<  99     4.0     |  26.0   |  0.98     Ca    9.2        14 Jan 2017 16:20    TPro  6.9    /  Alb  4.0    /  TBili  0.5    /  DBili  x      /  AST  23     /  ALT  19     /  AlkPhos  58     14 Jan 2017 16:20    PT/INR - ( 14 Jan 2017 16:20 )   PT: 12.1 sec;   INR: 1.10 ratio         PTT - ( 14 Jan 2017 16:20 )  PTT:32.0 sec        Serum Pro-Brain Natriuretic Peptide: 2702 pg/mL (01-14 @ 16:20)      Albumin, Serum: 4.0 g/dL (01-14 @ 16:20)    LIVER FUNCTIONS - ( 14 Jan 2017 16:20 )  Alb: 4.0 g/dL / Pro: 6.9 g/dL / ALK PHOS: 58 U/L / ALT: 19 U/L / AST: 23 U/L / GGT: x             RADIOLOGY & ADDITIONAL TESTS:  EKG  : Diagnosis Line Normal sinus rhythm  Left axis deviation  Right bundle branch block  Septal infarct , age undetermined Patient is a 71y old  Female who presents with a chief complaint of sob, leg swelling. doing better today  NO SOB, legs no edema  going for ECHO    CARDIOVASCULAR:  carvedilol 6.25milliGRAM(s) Oral every 12 hours  amLODIPine   Tablet 5milliGRAM(s) Oral daily  furosemide   Injectable 40milliGRAM(s) IV Push every 12 hours  nitroglycerin     SubLingual 0.4milliGRAM(s) SubLingual every 5 minutes PRN      NEUROLOGIC:  DULoxetine 60milliGRAM(s) Oral daily  morphine  - Injectable 2milliGRAM(s) IV Push every 6 hours PRN      GATROINTESTINAL:  docusate sodium 200milliGRAM(s) Oral daily PRN  pantoprazole    Tablet 40milliGRAM(s) Oral before breakfast      ENDO/METABOLIC:  atorvastatin 10milliGRAM(s) Oral at bedtime  levothyroxine 100MICROGram(s) Oral daily      Allergies    Keflex (Unknown)      Vital Signs Last 24 Hrs  T(C): 36.4, Max: 36.7 (01-14 @ 15:12)  T(F): 97.5, Max: 98 (01-14 @ 15:12)  HR: 75 (64 - 99)  BP: 106/58 (105/60 - 170/80)  BP(mean): --  RR: 17 (17 - 19)  SpO2: 96% (94% - 100%)      REVIEW OF SYSTEMS:    CONSTITUTIONAL: No fever, weight loss, or fatigue  EYES: No eye pain, visual disturbances, or discharge  ENMT:  No difficulty hearing, tinnitus, vertigo; No sinus or throat pain  NECK: No pain or stiffness  RESPIRATORY: No cough, wheezing, chills or hemoptysis; No shortness of breath  CARDIOVASCULAR: No chest pain, palpitations, dizziness, or leg swelling  GASTROINTESTINAL: No abdominal   NEUROLOGICAL: No headaches, memory loss, loss of strength, numbness, or tremors  SKIN: No itching, burning, rashes, or lesions   LYMPH NODES: No enlarged glands        PHYSICAL EXAM:    GENERAL: NAD, well-groomed, well-developed  HEAD:  Atraumatic, Normocephalic  EYES: EOMI, PERRLA, conjunctiva and sclera clear  ENMT: No tonsillar erythema, exudates, or enlargement; Moist mucous membranes, Good dentition, No lesions  NECK: Supple, No JVD, Normal thyroid  NERVOUS SYSTEM:  Alert & Oriented X3, Good concentration; Motor Strength 5/5 B/L upper and lower extremities; DTRs 2+ intact and symmetric  CHEST/LUNG: Clear to percussion bilaterally; No rales, rhonchi, wheezing, or rubs  HEART: irreg. + murmur, rubs, or gallops  ABDOMEN: Soft, Nontender, Nondistended; Bowel sounds present  EXTREMITIES:  2+ Peripheral Pulses      LABS:                        8.6    6.48  )-----------( 396      ( 14 Jan 2017 16:20 )             27.6     CBC Full  -  ( 14 Jan 2017 16:20 )  WBC Count : 6.48 K/uL  Hemoglobin : 8.6 g/dL  Hematocrit : 27.6 %  Platelet Count - Automated : 396 K/uL  Mean Cell Volume : 78.4 fl  Mean Cell Hemoglobin : 24.4 pg  Mean Cell Hemoglobin Concentration : 31.2 g/dL  Auto Neutrophil # : 2.9 K/uL  Auto Lymphocyte # : 2.3 K/uL  Auto Monocyte # : 1.1 K/uL  Auto Eosinophil # : 0.0 K/uL  Auto Basophil # : 0.1 K/uL  Auto Neutrophil % : 45.2 %  Auto Lymphocyte % : 36.1 %  Auto Monocyte % : 17.0 %  Auto Eosinophil % : 0.6 %  Auto Basophil % : 0.9 %    14 Jan 2017 16:20    142    |  102    |  17.0   ----------------------------<  99     4.0     |  26.0   |  0.98     Ca    9.2        14 Jan 2017 16:20    TPro  6.9    /  Alb  4.0    /  TBili  0.5    /  DBili  x      /  AST  23     /  ALT  19     /  AlkPhos  58     14 Jan 2017 16:20    PT/INR - ( 14 Jan 2017 16:20 )   PT: 12.1 sec;   INR: 1.10 ratio         PTT - ( 14 Jan 2017 16:20 )  PTT:32.0 sec        Serum Pro-Brain Natriuretic Peptide: 2702 pg/mL (01-14 @ 16:20)      Albumin, Serum: 4.0 g/dL (01-14 @ 16:20)    LIVER FUNCTIONS - ( 14 Jan 2017 16:20 )  Alb: 4.0 g/dL / Pro: 6.9 g/dL / ALK PHOS: 58 U/L / ALT: 19 U/L / AST: 23 U/L / GGT: x             RADIOLOGY & ADDITIONAL TESTS:  EKG  : Diagnosis Line Normal sinus rhythm  Left axis deviation  Right bundle branch block  Septal infarct , age undetermined

## 2017-01-15 NOTE — DISCHARGE NOTE ADULT - MEDICATION SUMMARY - MEDICATIONS TO TAKE
I will START or STAY ON the medications listed below when I get home from the hospital:    prolea  -- 20 milligram(s) intramuscular every 6 months  -- Last given in February 2016  -- Indication: For GI    DULoxetine 60 mg oral delayed release capsule  -- 1 cap(s) by mouth once a day  -- Indication: For Pain    Lipitor 10 mg oral tablet  -- 1 tab(s) by mouth once a day (at bedtime)  -- Indication: For Hyperlipidemia    Coreg 6.25 mg oral tablet  -- 1 tab(s) by mouth 2 times a day  -- Indication: For CAD    tiotropium 18 mcg inhalation capsule  -- 1 cap(s) inhaled once a day  -- Indication: For COPD    Norvasc 5 mg oral tablet  -- 1 tab(s) by mouth once a day  -- Indication: For Raynauds    Lasix  -- 80 milligram(s) by mouth once a day  -- Indication: For ChF    Colace 100 mg oral capsule  -- 2 cap(s) by mouth once a day  -- Indication: For Constipation    Protonix 40 mg oral delayed release tablet  -- 1 tab(s) by mouth once a day  -- Indication: For GERD (gastroesophageal reflux disease)    Synthroid 100 mcg (0.1 mg) oral tablet  -- 1 tab(s) by mouth once a day  -- Indication: For Hypothyroid    Vitamin D3 2000 intl units oral capsule  -- 1 cap(s) by mouth once a day  -- Indication: For MVI

## 2017-01-16 DIAGNOSIS — R06.02 SHORTNESS OF BREATH: ICD-10-CM

## 2017-01-16 DIAGNOSIS — D50.8 OTHER IRON DEFICIENCY ANEMIAS: ICD-10-CM

## 2017-01-16 LAB
CK SERPL-CCNC: 66 U/L — SIGNIFICANT CHANGE UP (ref 25–170)
TROPONIN T SERPL-MCNC: <0.01 NG/ML — SIGNIFICANT CHANGE UP (ref 0–0.06)

## 2017-01-16 PROCEDURE — 71275 CT ANGIOGRAPHY CHEST: CPT | Mod: 26

## 2017-01-16 PROCEDURE — 99233 SBSQ HOSP IP/OBS HIGH 50: CPT

## 2017-01-16 RX ORDER — ACETAMINOPHEN 500 MG
325 TABLET ORAL EVERY 4 HOURS
Qty: 0 | Refills: 0 | Status: DISCONTINUED | OUTPATIENT
Start: 2017-01-16 | End: 2017-01-18

## 2017-01-16 RX ORDER — ATORVASTATIN CALCIUM 80 MG/1
40 TABLET, FILM COATED ORAL AT BEDTIME
Qty: 0 | Refills: 0 | Status: DISCONTINUED | OUTPATIENT
Start: 2017-01-16 | End: 2017-01-18

## 2017-01-16 RX ORDER — ASPIRIN/CALCIUM CARB/MAGNESIUM 324 MG
81 TABLET ORAL DAILY
Qty: 0 | Refills: 0 | Status: DISCONTINUED | OUTPATIENT
Start: 2017-01-16 | End: 2017-01-18

## 2017-01-16 RX ORDER — TIOTROPIUM BROMIDE 18 UG/1
1 CAPSULE ORAL; RESPIRATORY (INHALATION) DAILY
Qty: 0 | Refills: 0 | Status: DISCONTINUED | OUTPATIENT
Start: 2017-01-16 | End: 2017-01-18

## 2017-01-16 RX ORDER — ASPIRIN/CALCIUM CARB/MAGNESIUM 324 MG
1 TABLET ORAL
Qty: 0 | Refills: 0 | COMMUNITY

## 2017-01-16 RX ADMIN — Medication 325 MILLIGRAM(S): at 13:14

## 2017-01-16 RX ADMIN — Medication 200 MILLIGRAM(S): at 22:35

## 2017-01-16 RX ADMIN — Medication 40 MILLIGRAM(S): at 05:30

## 2017-01-16 RX ADMIN — ATORVASTATIN CALCIUM 40 MILLIGRAM(S): 80 TABLET, FILM COATED ORAL at 21:06

## 2017-01-16 RX ADMIN — Medication 100 MICROGRAM(S): at 05:29

## 2017-01-16 RX ADMIN — CARVEDILOL PHOSPHATE 6.25 MILLIGRAM(S): 80 CAPSULE, EXTENDED RELEASE ORAL at 05:29

## 2017-01-16 RX ADMIN — Medication 325 MILLIGRAM(S): at 14:10

## 2017-01-16 RX ADMIN — CARVEDILOL PHOSPHATE 6.25 MILLIGRAM(S): 80 CAPSULE, EXTENDED RELEASE ORAL at 17:22

## 2017-01-16 RX ADMIN — PANTOPRAZOLE SODIUM 40 MILLIGRAM(S): 20 TABLET, DELAYED RELEASE ORAL at 05:29

## 2017-01-16 RX ADMIN — DULOXETINE HYDROCHLORIDE 60 MILLIGRAM(S): 30 CAPSULE, DELAYED RELEASE ORAL at 05:39

## 2017-01-16 RX ADMIN — AMLODIPINE BESYLATE 5 MILLIGRAM(S): 2.5 TABLET ORAL at 05:29

## 2017-01-16 RX ADMIN — Medication 40 MILLIGRAM(S): at 17:22

## 2017-01-16 NOTE — PROGRESS NOTE ADULT - SUBJECTIVE AND OBJECTIVE BOX
Patient is a 71y old  Female who presents with a chief complaint of sob, leg swelling (15 Milo 2017 13:36)    stts SOB today and could not walk down the ly   O2 sat in the 70s on r/a when walking     CARDIOVASCULAR:  carvedilol 6.25milliGRAM(s) Oral every 12 hours  amLODIPine   Tablet 5milliGRAM(s) Oral daily  furosemide   Injectable 40milliGRAM(s) IV Push every 12 hours  nitroglycerin     SubLingual 0.4milliGRAM(s) SubLingual every 5 minutes PRN      NEUROLOGIC:  DULoxetine 60milliGRAM(s) Oral daily  morphine  - Injectable 2milliGRAM(s) IV Push every 6 hours PRN      HEMATOLOGIC:  aspirin enteric coated 81milliGRAM(s) Oral daily    GATROINTESTINAL:  docusate sodium 200milliGRAM(s) Oral daily PRN  pantoprazole    Tablet 40milliGRAM(s) Oral before breakfast     MEDS:    ENDO/METABOLIC:  levothyroxine 100MICROGram(s) Oral daily  atorvastatin 40milliGRAM(s) Oral at bedtime        Allergies    Keflex (Unknown)    Intolerances        Vital Signs Last 24 Hrs  T(C): 36.8, Max: 36.9 ( @ 05:26)  T(F): 98.2, Max: 98.4 ( @ 05:26)  HR: 78 (70 - 88)  BP: 98/60 (98/60 - 112/60)  BP(mean): --  RR: 16 (16 - 17)  SpO2: 100% (95% - 100%)      Daily     Daily Weight in k.8 (2017 05:32)  I&O's Detail    Last Menstrual Period        REVIEW OF SYSTEMS:    CONSTITUTIONAL: No fever, weight loss, or fatigue  EYES: No eye pain, visual disturbances, or discharge  ENMT:  No difficulty hearing, tinnitus, vertigo; No sinus or throat pain  NECK: No pain or stiffness  RESPIRATORY: No cough, wheezing, chills or hemoptysis; No shortness of breath  CARDIOVASCULAR: No chest pain, palpitations, dizziness, or leg swelling  GASTROINTESTINAL: No abdominal or epigastric pain.  NEUROLOGICAL: No headaches, memory loss, loss of strength, numbness, or tremors  SKIN: No itching, burning, rashes, or lesions   LYMPH NODES: No enlarged glands  ENDOCRINE: No heat or cold intolerance; No hair loss        PHYSICAL EXAM:    GENERAL: NAD, well-groomed, well-developed  HEAD:  Atraumatic, Normocephalic  EYES: EOMI, PERRLA, conjunctiva and sclera clear  ENMT: No tonsillar erythema, exudates, or enlargement; Moist mucous membranes, Good dentition, No lesions  NECK: Supple, No JVD, Normal thyroid  NERVOUS SYSTEM:  Alert & Oriented X3, Good concentration; Motor Strength 5/5 B/L upper and lower extremities; DTRs 2+ intact and symmetric  CHEST/LUNG: Clear to percussion bilaterally; No rales, rhonchi, wheezing, or rubs  HEART: irreg, 2/3 murmurs,  ABDOMEN: Soft, Nontender, Nondistended; Bowel sounds present  EXTREMITIES:  2+ Peripheral Pulses,+ Raynauds         LABS:                        8.6    6.48  )-----------( 396      ( 2017 16:20 )             27.6     CBC Full  -  ( 2017 16:20 )  WBC Count : 6.48 K/uL  Hemoglobin : 8.6 g/dL  Hematocrit : 27.6 %  Platelet Count - Automated : 396 K/uL  Mean Cell Volume : 78.4 fl  Mean Cell Hemoglobin : 24.4 pg  Mean Cell Hemoglobin Concentration : 31.2 g/dL  Auto Neutrophil # : 2.9 K/uL  Auto Lymphocyte # : 2.3 K/uL  Auto Monocyte # : 1.1 K/uL  Auto Eosinophil # : 0.0 K/uL  Auto Basophil # : 0.1 K/uL  Auto Neutrophil % : 45.2 %  Auto Lymphocyte % : 36.1 %  Auto Monocyte % : 17.0 %  Auto Eosinophil % : 0.6 %  Auto Basophil % : 0.9 %    2017 16:20    142    |  102    |  17.0   ----------------------------<  99     4.0     |  26.0   |  0.98     Ca    9.2        2017 16:20    TPro  6.9    /  Alb  4.0    /  TBili  0.5    /  DBili  x      /  AST  23     /  ALT  19     /  AlkPhos  58     2017 16:20    PT/INR - ( 2017 16:20 )   PT: 12.1 sec;   INR: 1.10 ratio         PTT - ( 2017 16:20 )  PTT:32.0 sec        Serum Pro-Brain Natriuretic Peptide: 2702 pg/mL ( @ 16:20)        LIVER FUNCTIONS - ( 2017 16:20 )  Alb: 4.0 g/dL / Pro: 6.9 g/dL / ALK PHOS: 58 U/L / ALT: 19 U/L / AST: 23 U/L / GGT: x             RADIOLOGY & ADDITIONAL TESTS:

## 2017-01-16 NOTE — PROGRESS NOTE ADULT - SUBJECTIVE AND OBJECTIVE BOX
[place] [facility], THE HEART CENTER                                   [address] [place] [address]                                                      PHONE: [phone]                                                         FAX: [phone]  [email or web address]  ---------------------------------------------------------------------------------------------------------------------------------    Overnight events/patient complaints:  no events      PAST MEDICAL & SURGICAL HISTORY:  Anemia  Ventricular tachycardia  Cardiomyopathy  Scleroderma  Chronic back pain  Sleep apnea  Raynaud&#x27;s disease  GI bleed not requiring more than 4 units of blood in 24 hours, ICU, or surgery  Hypothyroid  Afib  MI (myocardial infarction): 3 times (2000,2002)  HLD (hyperlipidemia)  HTN (hypertension)  History of breast surgery: &quot;Lift&quot;  Spinal cord stimulator status: [employer] (2014)  AICD (automatic cardioverter/defibrillator) present: [employer] (2006)  Hernia: , Lariat procedure (2012)  Bariatric surgery status: gastric bypass 2001      Keflex (Unknown)    MEDICATIONS  (STANDING):  DULoxetine 60milliGRAM(s) Oral daily  atorvastatin 10milliGRAM(s) Oral at bedtime  carvedilol 6.25milliGRAM(s) Oral every 12 hours  amLODIPine   Tablet 5milliGRAM(s) Oral daily  pantoprazole    Tablet 40milliGRAM(s) Oral before breakfast  levothyroxine 100MICROGram(s) Oral daily  furosemide   Injectable 40milliGRAM(s) IV Push every 12 hours    MEDICATIONS  (PRN):  docusate sodium 200milliGRAM(s) Oral daily PRN Constipation  morphine  - Injectable 2milliGRAM(s) IV Push every 6 hours PRN chest pain  nitroglycerin     SubLingual 0.4milliGRAM(s) SubLingual every 5 minutes PRN Chest Pain      Vital Signs Last 24 Hrs  T(C): 36.9, Max: 36.9 (01-16 @ 05:26)  T(F): 98.4, Max: 98.4 (01-16 @ 05:26)  HR: 73 (73 - 88)  BP: 110/58 (106/58 - 112/60)  BP(mean): --  RR: 16 (16 - 17)  SpO2: 99% (96% - 99%)  ICU Vital Signs Last 24 Hrs  MARGARITO PHUC  I&O's Detail    I&O's Summary    Drug Dosing Weight  MARGARITO FRIEND      PHYSICAL EXAM:  General: Appears well developed, well nourished alert and cooperative.  HEENT: Head; normocephalic, atraumatic.  Eyes: Pupils reactive, cornea wnl.  Neck: Supple, no nodes adenopathy, no NVD or carotid bruit or thyromegaly.  CARDIOVASCULAR: Normal S1 and S2, No murmur, rub, gallop or lift.   LUNGS: No rales, rhonchi or wheeze. Normal breath sounds bilaterally.  ABDOMEN: Soft, nontender without mass or organomegaly. bowel sounds normoactive.  EXTREMITIES: No clubbing, cyanosis or edema. Distal pulses wnl.   SKIN: warm and dry with normal turgor.  NEURO: Alert/oriented x 3/normal motor exam. No pathologic reflexes.    PSYCH: normal affect.        LABS:                        8.6    6.48  )-----------( 396      ( 14 Jan 2017 16:20 )             27.6     14 Jan 2017 16:20    142    |  102    |  17.0   ----------------------------<  99     4.0     |  26.0   |  0.98     Ca    9.2        14 Jan 2017 16:20    TPro  6.9    /  Alb  4.0    /  TBili  0.5    /  DBili  x      /  AST  23     /  ALT  19     /  AlkPhos  58     14 Jan 2017 16:20    MARGARITO PHUC  CARDIAC MARKERS ( 16 Jan 2017 05:17 )  x     / <0.01 ng/mL / 66 U/L / x     / x      CARDIAC MARKERS ( 14 Jan 2017 16:20 )  x     / <0.01 ng/mL / 75 U/L / x     / x          PT/INR - ( 14 Jan 2017 16:20 )   PT: 12.1 sec;   INR: 1.10 ratio         PTT - ( 14 Jan 2017 16:20 )  PTT:32.0 sec      RADIOLOGY & ADDITIONAL STUDIES:    INTERPRETATION OF TELEMETRY (personally reviewed):    ECG:    ECHO:   2D AND M-MODE MEASUREMENTS (normal ranges within parentheses):  Left                Normal    Aorta/Left           Normal  Ventricle:                    Atrium:  IVSd (2D):    1.01  (0.7-1.1) Aortic Root   3.20   (2.4-3.7)                cm              (Mmode):      cm  LVPWd (2D):   1.00  (0.7-1.1) AoV Cusp      1.60   (1.5-2.6)                cm              Separation:   cm  LVIDd (2D):   4.93  (3.4-5.7) LA Volume     91.1                cm              Index         ml/m²  LVIDs (2D):   3.88            Right Ventricle:                cm              TAPSE:           1.06 cm  LV FS (2D):   21.3  (>25%)                %  Relative Wall 0.41  (<0.42)  Thickness     LV SYSTOLIC FUNCTION BY 2D PLANIMETRY (MOD):  EF-Biplane: 51.0 %     LV DIASTOLIC FUNCTION:  MV Peak E: 0.95 m/s e', MV Florencia: 0.07 m/s                      E/e' Ratio: 12.86     SPECTRAL DOPPLER ANALYSIS (where applicable):  Mitral Insufficiency by PISA:  MR Volume: 45.00 ml MR Flow Rate: 157.87 ml/s MR EROA: 27.81 mm²     Tricuspid Valve and PA/RV Systolic Pressure: TR Max Velocity: 2.82 m/s   RA Pressure: 15 mmHg RVSP/PASP: 46.8 mmHg        PHYSICIAN INTERPRETATION:  Left Ventricle: The left ventricular internal cavity size is normal. Left   ventricular wall thickness is normal.  Global LV systolic function was normal. Left ventricular ejection   fraction, by visual estimation, is 45 to 50%.  Right Ventricle: Normal right ventricular size and function. TV S' 1.2   m/s.  Left Atrium: Severely enlarged left atrium.  Right Atrium: The right atrium is moderately dilated.  Pericardium: There is no evidence of pericardial effusion. There is a   moderate pleural effusion in both left and right lateral regions.  Mitral Valve: Thickening of the anterior and posterior mitral valve   leaflets. Severe mitral annular dilatation. Moderate to severe mitral   valve regurgitation is seen. The MR jet is posteriorly-directed.  Tricuspid Valve: The tricuspid valve is normal in structure. Moderate   tricuspid regurgitation is visualized. Estimated pulmonary artery   systolic pressure is 46.8 mmHg assuming a right atrial pressure of 15   mmHg, which is consistent with mild pulmonary hypertension.  Aortic Valve: The aortic valve is trileaflet. Sclerotic aortic valve with   normal opening. Trivial aortic valve regurgitation is seen.  Pulmonic Valve: The pulmonic valve was not well visualized. Trace   pulmonic valve regurgitation.  Aorta: The aortic root is normal in size and structure.  Pulmonary Artery: The pulmonary artery is moderately dilated.  Venous: A systolic blunting flow pattern is recorded from the right upper   pulmonary vein. The inferior vena cava was dilated, with respiratory size   variation less than 50%.  Additional Comments: A pacer wire is visualized in the right ventricle   and right atrium.        Summary:   1. Severely enlarged left atrium.   2. Left ventricular ejection fraction, by visual estimation, is 45 to   50%.   3. Moderately dilated right atrium.   4. Normal right ventricular size and function.   5. Moderate to severe mitral valve regurgitation.   6. Moderate tricuspid regurgitation.   7. Estimated pulmonary artery systolic pressure is 46.8 mmHg - mild   pulmonary hypertension.   8. There is no evidence of pericardial effusion.   9. Moderate pleural effusion in both left and right lateral regions.  10. Compared to study on April 2013, the LV function improved, but MR has   significantly worsen.  ASSESSMENT AND PLAN:  In summary, MARGARITO FRIEND is an 71y Female PMHx of  NICMP s/p AICD 2006, PAF last LVEF 60% who had been doing well recently up to the last few weeks when her Lasix and Coreg dose had been decreased due to low BP c/o SOB, SUAREZ and LE edema for the last few days that got worse yesterday. Found with evidence of Acute on chronic CHF     start asa 81mg   start lisinopril 2.5 mg daily      Thank you for allowing [facility] to participate in the care of this patient.  Please feel free to call with any questions or concerns. [place] [facility], [facility]                                   [address] [place] [address]                                                      PHONE: [phone]                                                         FAX: [phone]  [email or web address]  ---------------------------------------------------------------------------------------------------------------------------------    Overnight events/patient complaints:  no events      PAST MEDICAL & SURGICAL HISTORY:  Anemia  Ventricular tachycardia  Cardiomyopathy  Scleroderma  Chronic back pain  Sleep apnea  Raynaud&#x27;s disease  GI bleed not requiring more than 4 units of blood in 24 hours, ICU, or surgery  Hypothyroid  Afib  MI (myocardial infarction): 3 times (2000,2002)  HLD (hyperlipidemia)  HTN (hypertension)  History of breast surgery: &quot;Lift&quot;  Spinal cord stimulator status: [employer] (2014)  AICD (automatic cardioverter/defibrillator) present: [employer] (2006)  Hernia: , Lariat procedure (2012)  Bariatric surgery status: gastric bypass 2001      Keflex (Unknown)    MEDICATIONS  (STANDING):  DULoxetine 60milliGRAM(s) Oral daily  atorvastatin 10milliGRAM(s) Oral at bedtime  carvedilol 6.25milliGRAM(s) Oral every 12 hours  amLODIPine   Tablet 5milliGRAM(s) Oral daily  pantoprazole    Tablet 40milliGRAM(s) Oral before breakfast  levothyroxine 100MICROGram(s) Oral daily  furosemide   Injectable 40milliGRAM(s) IV Push every 12 hours    MEDICATIONS  (PRN):  docusate sodium 200milliGRAM(s) Oral daily PRN Constipation  morphine  - Injectable 2milliGRAM(s) IV Push every 6 hours PRN chest pain  nitroglycerin     SubLingual 0.4milliGRAM(s) SubLingual every 5 minutes PRN Chest Pain      Vital Signs Last 24 Hrs  T(C): 36.9, Max: 36.9 (01-16 @ 05:26)  T(F): 98.4, Max: 98.4 (01-16 @ 05:26)  HR: 73 (73 - 88)  BP: 110/58 (106/58 - 112/60)  BP(mean): --  RR: 16 (16 - 17)  SpO2: 99% (96% - 99%)  ICU Vital Signs Last 24 Hrs  MARGARITO PHUC  I&O's Detail    I&O's Summary    Drug Dosing Weight  MARGARITO FRIEND      PHYSICAL EXAM:  General: Appears well developed, well nourished alert and cooperative.  HEENT: Head; normocephalic, atraumatic.  Eyes: Pupils reactive, cornea wnl.  Neck: Supple, no nodes adenopathy, no NVD or carotid bruit or thyromegaly.  CARDIOVASCULAR: Normal S1 and S2, No murmur, rub, gallop or lift.   LUNGS: No rales, rhonchi or wheeze. Normal breath sounds bilaterally.  ABDOMEN: Soft, nontender without mass or organomegaly. bowel sounds normoactive.  EXTREMITIES: No clubbing, cyanosis or edema. Distal pulses wnl.   SKIN: warm and dry with normal turgor.  NEURO: Alert/oriented x 3/normal motor exam. No pathologic reflexes.    PSYCH: normal affect.        LABS:                        8.6    6.48  )-----------( 396      ( 14 Jan 2017 16:20 )             27.6     14 Jan 2017 16:20    142    |  102    |  17.0   ----------------------------<  99     4.0     |  26.0   |  0.98     Ca    9.2        14 Jan 2017 16:20    TPro  6.9    /  Alb  4.0    /  TBili  0.5    /  DBili  x      /  AST  23     /  ALT  19     /  AlkPhos  58     14 Jan 2017 16:20    MARGARITO PHUC  CARDIAC MARKERS ( 16 Jan 2017 05:17 )  x     / <0.01 ng/mL / 66 U/L / x     / x      CARDIAC MARKERS ( 14 Jan 2017 16:20 )  x     / <0.01 ng/mL / 75 U/L / x     / x          PT/INR - ( 14 Jan 2017 16:20 )   PT: 12.1 sec;   INR: 1.10 ratio         PTT - ( 14 Jan 2017 16:20 )  PTT:32.0 sec      RADIOLOGY & ADDITIONAL STUDIES:    INTERPRETATION OF TELEMETRY (personally reviewed):    ECG:    ECHO:   2D AND M-MODE MEASUREMENTS (normal ranges within parentheses):  Left                Normal    Aorta/Left           Normal  Ventricle:                    Atrium:  IVSd (2D):    1.01  (0.7-1.1) Aortic Root   3.20   (2.4-3.7)                cm              (Mmode):      cm  LVPWd (2D):   1.00  (0.7-1.1) AoV Cusp      1.60   (1.5-2.6)                cm              Separation:   cm  LVIDd (2D):   4.93  (3.4-5.7) LA Volume     91.1                cm              Index         ml/m²  LVIDs (2D):   3.88            Right Ventricle:                cm              TAPSE:           1.06 cm  LV FS (2D):   21.3  (>25%)                %  Relative Wall 0.41  (<0.42)  Thickness     LV SYSTOLIC FUNCTION BY 2D PLANIMETRY (MOD):  EF-Biplane: 51.0 %     LV DIASTOLIC FUNCTION:  MV Peak E: 0.95 m/s e', MV Florencia: 0.07 m/s                      E/e' Ratio: 12.86     SPECTRAL DOPPLER ANALYSIS (where applicable):  Mitral Insufficiency by PISA:  MR Volume: 45.00 ml MR Flow Rate: 157.87 ml/s MR EROA: 27.81 mm²     Tricuspid Valve and PA/RV Systolic Pressure: TR Max Velocity: 2.82 m/s   RA Pressure: 15 mmHg RVSP/PASP: 46.8 mmHg        PHYSICIAN INTERPRETATION:  Left Ventricle: The left ventricular internal cavity size is normal. Left   ventricular wall thickness is normal.  Global LV systolic function was normal. Left ventricular ejection   fraction, by visual estimation, is 45 to 50%.  Right Ventricle: Normal right ventricular size and function. TV S' 1.2   m/s.  Left Atrium: Severely enlarged left atrium.  Right Atrium: The right atrium is moderately dilated.  Pericardium: There is no evidence of pericardial effusion. There is a   moderate pleural effusion in both left and right lateral regions.  Mitral Valve: Thickening of the anterior and posterior mitral valve   leaflets. Severe mitral annular dilatation. Moderate to severe mitral   valve regurgitation is seen. The MR jet is posteriorly-directed.  Tricuspid Valve: The tricuspid valve is normal in structure. Moderate   tricuspid regurgitation is visualized. Estimated pulmonary artery   systolic pressure is 46.8 mmHg assuming a right atrial pressure of 15   mmHg, which is consistent with mild pulmonary hypertension.  Aortic Valve: The aortic valve is trileaflet. Sclerotic aortic valve with   normal opening. Trivial aortic valve regurgitation is seen.  Pulmonic Valve: The pulmonic valve was not well visualized. Trace   pulmonic valve regurgitation.  Aorta: The aortic root is normal in size and structure.  Pulmonary Artery: The pulmonary artery is moderately dilated.  Venous: A systolic blunting flow pattern is recorded from the right upper   pulmonary vein. The inferior vena cava was dilated, with respiratory size   variation less than 50%.  Additional Comments: A pacer wire is visualized in the right ventricle   and right atrium.        Summary:   1. Severely enlarged left atrium.   2. Left ventricular ejection fraction, by visual estimation, is 45 to   50%.   3. Moderately dilated right atrium.   4. Normal right ventricular size and function.   5. Moderate to severe mitral valve regurgitation.   6. Moderate tricuspid regurgitation.   7. Estimated pulmonary artery systolic pressure is 46.8 mmHg - mild   pulmonary hypertension.   8. There is no evidence of pericardial effusion.   9. Moderate pleural effusion in both left and right lateral regions.  10. Compared to study on April 2013, the LV function improved, but MR has   significantly worsen.  ASSESSMENT AND PLAN:  In summary, MARGARITO FRIEND is an 71y Female PMHx of  NICMP s/p AICD 2006, PAF last LVEF 60% who had been doing well recently up to the last few weeks when her Lasix and Coreg dose had been decreased due to low BP c/o SOB, SUAREZ and LE edema for the last few days that got worse yesterday. Found with evidence of Acute on chronic CHF     start asa 81mg   start lisinopril 2.5 mg daily  ? d.c home today if pt is able to ambulate and feeling well if not today then tomorrow  will arrange for f/u within 2 weeks     Thank you for allowing [facility] to participate in the care of this patient.  Please feel free to call with any questions or concerns.

## 2017-01-16 NOTE — CONSULT NOTE ADULT - SUBJECTIVE AND OBJECTIVE BOX
PULMONARY CONSULT NOTE      OK FRIEND-[id number]      72 y/o F w/hx NICM, CHF, s/p AICD, afib, anemia(baseline 9), hx gastric bypass, gi bleeding,  scleroderma, OA, hypothyroidism, GERD. Presents to the ED c/o of sob that started 4 days ago. Also noticed increased leg swelling, and gain weight. approx 13 lbs. Pt states that 2 weeks ago, her lasix med was cut down from 80 to 40 mg daily. Also c/o of cp, precordial, no radiation, associated w/ exertion, no atten f. Denies palpitations, cough, n/v, abd pain, loc or any further complaints. (14 Jan 2017 23:46)  Better with diuresis  Exertional desat  No dyspnea at rest or leg pain  >40 pack year smoker      HISTORY OF PRESENT ILLNESS:    MEDICATIONS  (STANDING):  DULoxetine 60milliGRAM(s) Oral daily  carvedilol 6.25milliGRAM(s) Oral every 12 hours  amLODIPine   Tablet 5milliGRAM(s) Oral daily  pantoprazole    Tablet 40milliGRAM(s) Oral before breakfast  levothyroxine 100MICROGram(s) Oral daily  furosemide   Injectable 40milliGRAM(s) IV Push every 12 hours  atorvastatin 40milliGRAM(s) Oral at bedtime  aspirin enteric coated 81milliGRAM(s) Oral daily  tiotropium 18 MICROgram(s) Capsule 1Capsule(s) Inhalation daily      MEDICATIONS  (PRN):  docusate sodium 200milliGRAM(s) Oral daily PRN Constipation  morphine  - Injectable 2milliGRAM(s) IV Push every 6 hours PRN chest pain  nitroglycerin     SubLingual 0.4milliGRAM(s) SubLingual every 5 minutes PRN Chest Pain  acetaminophen   Tablet. 325milliGRAM(s) Oral every 4 hours PRN Moderate Pain (4 - 6)      Allergies    Keflex (Unknown)    Intolerances        PAST MEDICAL & SURGICAL HISTORY:  Anemia  Ventricular tachycardia  Cardiomyopathy  Scleroderma  Chronic back pain  Sleep apnea  Raynaud&#x27;s disease  GI bleed not requiring more than 4 units of blood in 24 hours, ICU, or surgery  Hypothyroid  Afib  MI (myocardial infarction): 3 times (2000,2002)  HLD (hyperlipidemia)  HTN (hypertension)  History of breast surgery: &quot;Lift&quot;  Spinal cord stimulator status: [employer] (2014)  AICD (automatic cardioverter/defibrillator) present: [employer] (2006)  Hernia: , Lariat procedure (2012)  Bariatric surgery status: gastric bypass 2001      FAMILY HISTORY:  Family history of kidney cancer  Family history of heart disease (Sibling)      SOCIAL HISTORY  Smoking History:     REVIEW OF SYSTEMS:    CONSTITUTIONAL:  No fevers, chills, sweats    HEENT:  Eyes:  No diplopia or blurred vision. ENT:  No earache, sore throat or runny nose.    CARDIOVASCULAR:  No pressure, squeezing, tightness, or heaviness about the chest; no palpitations.    RESPIRATORY:  No cough, shortness of breath, PND or orthopnea. Mild SOBOE    GASTROINTESTINAL:  No abdominal pain, nausea, vomiting or diarrhea.    GENITOURINARY:  No dysuria, frequency or urgency.    NEUROLOGIC:  No paresthesias, fasciculations, seizures or weakness.    PSYCHIATRIC:  No disorder of thought or mood.    Vital Signs Last 24 Hrs  T(C): 36.8, Max: 36.9 (01-16 @ 05:26)  T(F): 98.2, Max: 98.4 (01-16 @ 05:26)  HR: 78 (70 - 88)  BP: 98/60 (98/60 - 112/60)  BP(mean): --  RR: 16 (16 - 16)  SpO2: 100% (95% - 100%)    PHYSICAL EXAMINATION:    GENERAL: The patient is a well-developed, well-nourished _____in no apparent distress.     HEENT: Head is normocephalic and atraumatic. Extraocular muscles are intact. Mucous membranes are moist.     NECK: Supple.     LUNGS: Clear to auscultation without wheezing, rales, or rhonchi. Respirations unlabored    HEART: Regular rate and rhythm without murmur.    ABDOMEN: Soft, nontender, and nondistended.  No hepatosplenomegaly is noted.    EXTREMITIES: Without any cyanosis, clubbing, rash, lesions or edema.    NEUROLOGIC: Grossly intact.      LABS:                        8.6    6.48  )-----------( 396      ( 14 Jan 2017 16:20 )             27.6     14 Jan 2017 16:20    142    |  102    |  17.0   ----------------------------<  99     4.0     |  26.0   |  0.98     Ca    9.2        14 Jan 2017 16:20    TPro  6.9    /  Alb  4.0    /  TBili  0.5    /  DBili  x      /  AST  23     /  ALT  19     /  AlkPhos  58     14 Jan 2017 16:20    PT/INR - ( 14 Jan 2017 16:20 )   PT: 12.1 sec;   INR: 1.10 ratio         PTT - ( 14 Jan 2017 16:20 )  PTT:32.0 sec      CARDIAC MARKERS ( 16 Jan 2017 05:17 )  x     / <0.01 ng/mL / 66 U/L / x     / x      CARDIAC MARKERS ( 14 Jan 2017 16:20 )  x     / <0.01 ng/mL / 75 U/L / x     / x            Serum Pro-Brain Natriuretic Peptide: 2702 pg/mL (01-14 @ 16:20)        RADIOLOGY & ADDITIONAL STUDIES:   EXAM:  CHEST SINGLE VIEW FRONTAL                          PROCEDURE DATE:  01/15/2017      INTERPRETATION:  Chest, single portable view    HISTORY: Dyspnea    Comparison: 6/29    IMPRESSION:    Support Devices: Cardiac pacer wires are seen.  Heart: Cardiomegaly  Mediastinum:  Unremarkable  Lungs/Airways: Small pleural effusions/bibasilar airspace disease and   mild pulmonary edema  Bones/Soft tissues: Unremarkable    CAROLANN MCKENZIE M.D., ATTENDING RADIOLOGIST  This document has been electronically signed. Milo 15 2017  9:43AM      Echo on 1/15/17   1. Severely enlarged left atrium.   2. Left ventricular ejection fraction, by visual estimation, is 45 to   50%.   3. Moderately dilated right atrium.   4. Normal right ventricular size and function.   5. Moderate to severe mitral valve regurgitation.   6. Moderate tricuspid regurgitation.   7. Estimated pulmonary artery systolic pressure is 46.8 mmHg - mild   pulmonary hypertension.   8. There is no evidence of pericardial effusion.   9. Moderate pleural effusion in both left and right lateral regions.  10. Compared to study on April 2013, the LV function improved, but MR has   significantly worsen.     MD Gaby Electronically signed on 1/15/2017 at 5:34:10 PM

## 2017-01-17 DIAGNOSIS — I42.0 DILATED CARDIOMYOPATHY: ICD-10-CM

## 2017-01-17 DIAGNOSIS — E78.00 PURE HYPERCHOLESTEROLEMIA, UNSPECIFIED: ICD-10-CM

## 2017-01-17 PROCEDURE — 99233 SBSQ HOSP IP/OBS HIGH 50: CPT

## 2017-01-17 RX ORDER — TIOTROPIUM BROMIDE 18 UG/1
1 CAPSULE ORAL; RESPIRATORY (INHALATION)
Qty: 30 | Refills: 2 | OUTPATIENT
Start: 2017-01-17

## 2017-01-17 RX ADMIN — CARVEDILOL PHOSPHATE 6.25 MILLIGRAM(S): 80 CAPSULE, EXTENDED RELEASE ORAL at 05:01

## 2017-01-17 RX ADMIN — Medication 40 MILLIGRAM(S): at 05:00

## 2017-01-17 RX ADMIN — Medication 100 MICROGRAM(S): at 05:00

## 2017-01-17 RX ADMIN — ATORVASTATIN CALCIUM 40 MILLIGRAM(S): 80 TABLET, FILM COATED ORAL at 23:03

## 2017-01-17 RX ADMIN — Medication 40 MILLIGRAM(S): at 17:42

## 2017-01-17 RX ADMIN — Medication 325 MILLIGRAM(S): at 01:31

## 2017-01-17 RX ADMIN — AMLODIPINE BESYLATE 5 MILLIGRAM(S): 2.5 TABLET ORAL at 05:01

## 2017-01-17 RX ADMIN — DULOXETINE HYDROCHLORIDE 60 MILLIGRAM(S): 30 CAPSULE, DELAYED RELEASE ORAL at 05:01

## 2017-01-17 RX ADMIN — Medication 200 MILLIGRAM(S): at 23:03

## 2017-01-17 RX ADMIN — PANTOPRAZOLE SODIUM 40 MILLIGRAM(S): 20 TABLET, DELAYED RELEASE ORAL at 05:00

## 2017-01-17 RX ADMIN — Medication 325 MILLIGRAM(S): at 02:27

## 2017-01-17 RX ADMIN — TIOTROPIUM BROMIDE 1 CAPSULE(S): 18 CAPSULE ORAL; RESPIRATORY (INHALATION) at 10:23

## 2017-01-17 RX ADMIN — CARVEDILOL PHOSPHATE 6.25 MILLIGRAM(S): 80 CAPSULE, EXTENDED RELEASE ORAL at 17:43

## 2017-01-17 NOTE — PROGRESS NOTE ADULT - ASSESSMENT
Valvular HD (MR) and diastolic HF  COPD  No evidence of VTE  Secondary pulmonary HTN  No evidence of active Scleroderma related lung disease at present
pt well known to us  no home O2 needed. this is new. will follow pt today and likely d/c home tomorrow
pt well known to our office  was on lasix 80mg daily for over 15 yrs and recently cardio changed to 40mg daily and increased Norvasc to 5mg daily.  Pt went into an acute CHF
will order CTA.  pul called  + history of smoking

## 2017-01-17 NOTE — PROGRESS NOTE ADULT - SUBJECTIVE AND OBJECTIVE BOX
PULMONARY PROGRESS NOTE      OK FRIEND-[id number]    70 y/o F w/hx NICM, CHF, s/p AICD, afib, anemia(baseline 9), hx gastric bypass, gi bleeding,  scleroderma, OA, hypothyroidism, GERD. Presents to the ED c/o of sob that started 4 days ago. Also noticed increased leg swelling, and gain weight. approx 13 lbs. Pt states that 2 weeks ago, her lasix med was cut down from 80 to 40 mg daily. Also c/o of cp, precordial, no radiation, associated w/ exertion, no atten f. Denies palpitations, cough, n/v, abd pain, loc or any further complaints. (14 Jan 2017 23:46)  Better with diuresis  Exertional desat resolved  No dyspnea at rest or leg pain  >40 pack year smoker        INTERVAL HPI/OVERNIGHT EVENTS:  Comfortable.  No longer 02 requiring    MEDICATIONS  (STANDING):  DULoxetine 60milliGRAM(s) Oral daily  carvedilol 6.25milliGRAM(s) Oral every 12 hours  amLODIPine   Tablet 5milliGRAM(s) Oral daily  pantoprazole    Tablet 40milliGRAM(s) Oral before breakfast  levothyroxine 100MICROGram(s) Oral daily  furosemide   Injectable 40milliGRAM(s) IV Push every 12 hours  atorvastatin 40milliGRAM(s) Oral at bedtime  aspirin enteric coated 81milliGRAM(s) Oral daily  tiotropium 18 MICROgram(s) Capsule 1Capsule(s) Inhalation daily      MEDICATIONS  (PRN):  docusate sodium 200milliGRAM(s) Oral daily PRN Constipation  morphine  - Injectable 2milliGRAM(s) IV Push every 6 hours PRN chest pain  nitroglycerin     SubLingual 0.4milliGRAM(s) SubLingual every 5 minutes PRN Chest Pain  acetaminophen   Tablet. 325milliGRAM(s) Oral every 4 hours PRN Moderate Pain (4 - 6)      Allergies    Keflex (Unknown)    Intolerances        PAST MEDICAL & SURGICAL HISTORY:  Anemia  Ventricular tachycardia  Cardiomyopathy  Scleroderma  Chronic back pain  Sleep apnea  Raynaud&#x27;s disease  GI bleed not requiring more than 4 units of blood in 24 hours, ICU, or surgery  Hypothyroid  Afib  MI (myocardial infarction): 3 times (2000,2002)  HLD (hyperlipidemia)  HTN (hypertension)  History of breast surgery: &quot;Lift&quot;  Spinal cord stimulator status: [employer] (2014)  AICD (automatic cardioverter/defibrillator) present: [employer] (2006)  Hernia: , Lariat procedure (2012)  Bariatric surgery status: gastric bypass 2001      SOCIAL HISTORY  Smoking History:       REVIEW OF SYSTEMS:    CONSTITUTIONAL:  No distress    HEENT:  Eyes:  No diplopia or blurred vision. ENT:  No earache, sore throat or runny nose.    CARDIOVASCULAR:  No pressure, squeezing, tightness, heaviness or aching about the chest; no palpitations.    RESPIRATORY:  No cough, shortness of breath, PND or orthopnea. Mild SOBOE    GASTROINTESTINAL:  No nausea, vomiting or diarrhea.    GENITOURINARY:  No dysuria, frequency or urgency.    NEUROLOGIC:  No paresthesias, fasciculations, seizures or weakness.    PSYCHIATRIC:  No disorder of thought or mood.    Vital Signs Last 24 Hrs  T(C): 36.2, Max: 36.7 (01-16 @ 16:28)  T(F): 97.2, Max: 98.1 (01-17 @ 04:55)  HR: 77 (75 - 113)  BP: 104/62 (104/52 - 122/64)  BP(mean): --  RR: 18 (16 - 18)  SpO2: 98% (95% - 99%)    PHYSICAL EXAMINATION:    GENERAL: The patient is awake and alert in no apparent distress.     HEENT: Head is normocephalic and atraumatic. Extraocular muscles are intact. Mucous membranes are moist.    NECK: Supple.    LUNGS: Clear to auscultation without wheezing, rales or rhonchi; respirations unlabored    HEART: Regular rate and rhythm without murmur.    ABDOMEN: Soft, nontender, and nondistended.      EXTREMITIES: Without any cyanosis, clubbing, rash, lesions or edema.    NEUROLOGIC: Grossly intact.    LABS:                        8.6    6.48  )-----------( 396      ( 14 Jan 2017 16:20 )             27.6     14 Jan 2017 16:20    142    |  102    |  17.0   ----------------------------<  99     4.0     |  26.0   |  0.98     Ca    9.2        14 Jan 2017 16:20    TPro  6.9    /  Alb  4.0    /  TBili  0.5    /  DBili  x      /  AST  23     /  ALT  19     /  AlkPhos  58     14 Jan 2017 16:20    PT/INR - ( 14 Jan 2017 16:20 )   PT: 12.1 sec;   INR: 1.10 ratio         PTT - ( 14 Jan 2017 16:20 )  PTT:32.0 sec      CARDIAC MARKERS ( 16 Jan 2017 05:17 )  x     / <0.01 ng/mL / 66 U/L / x     / x      CARDIAC MARKERS ( 14 Jan 2017 16:20 )  x     / <0.01 ng/mL / 75 U/L / x     / x            Serum Pro-Brain Natriuretic Peptide: 2702 pg/mL (01-14 @ 16:20)      Echo   1. Severely enlarged left atrium.   2. Left ventricular ejection fraction, by visual estimation, is 45 to   50%.   3. Moderately dilated right atrium.   4. Normal right ventricular size and function.   5. Moderate to severe mitral valve regurgitation.   6. Moderate tricuspid regurgitation.   7. Estimated pulmonary artery systolic pressure is 46.8 mmHg - mild   pulmonary hypertension.   8. There is no evidence of pericardial effusion.   9. Moderate pleural effusion in both left and right lateral regions.  10. Compared to study on April 2013, the LV function improved, but MR has   significantly worsen.     MD Gaby Electronically signed on 1/15/2017 at 5:34:10 PM                CARDIAC MARKERS ( 16 Jan 2017 05:17 )  x     / <0.01 ng/mL / 66 U/L / x     / x            Serum Pro-Brain Natriuretic Peptide: 2702 pg/mL (01-14 @ 16:20)          MICROBIOLOGY:    RADIOLOGY & ADDITIONAL STUDIES:    CTA on 1/16/17  IMPRESSION:     No pulmonary embolism. Small pleural effusions. Cardiomegaly.  CAROLANN MCKENZIE M.D., ATTENDING RADIOLOGIST  This document has been electronically signed. Jan 17 2017 11:16AM

## 2017-01-17 NOTE — PROGRESS NOTE ADULT - SUBJECTIVE AND OBJECTIVE BOX
Patient is a 71y old  Female who presents with a chief complaint of sob, leg swelling,  still c/o SOB when walking... today r/a O2 Sat 94%   CTA report pending         CARDIOVASCULAR:  carvedilol 6.25milliGRAM(s) Oral every 12 hours  amLODIPine   Tablet 5milliGRAM(s) Oral daily  furosemide   Injectable 40milliGRAM(s) IV Push every 12 hours  nitroglycerin     SubLingual 0.4milliGRAM(s) SubLingual every 5 minutes PRN    PULMONARY:  tiotropium 18 MICROgram(s) Capsule 1Capsule(s) Inhalation daily    NEUROLOGIC:  DULoxetine 60milliGRAM(s) Oral daily  morphine  - Injectable 2milliGRAM(s) IV Push every 6 hours PRN  acetaminophen   Tablet. 325milliGRAM(s) Oral every 4 hours PRN    HEMATOLOGIC:  aspirin enteric coated 81milliGRAM(s) Oral daily    GATROINTESTINAL:  docusate sodium 200milliGRAM(s) Oral daily PRN  pantoprazole    Tablet 40milliGRAM(s) Oral before breakfast      ENDO/METABOLIC:  levothyroxine 100MICROGram(s) Oral daily  atorvastatin 40milliGRAM(s) Oral at bedtime        Allergies: Keflex (Unknown)    Vital Signs Last 24 Hrs  T(C): 36.7, Max: 36.8 (01-16 @ 11:52)  T(F): 98.1, Max: 98.2 (01-16 @ 11:52)  HR: 80 (70 - 113)  BP: 104/52 (98/60 - 122/64)  BP(mean): --  RR: 18 (16 - 18)  SpO2: 95% (95% - 100%)    REVIEW OF SYSTEMS:    CONSTITUTIONAL: No fever, weight loss, or fatigue  EYES: No eye pain, visual disturbances, or discharge  ENMT:  No difficulty hearing, tinnitus, vertigo; No sinus or throat pain  NECK: No pain or stiffness  RESPIRATORY: No cough, wheezing, chills or hemoptysis; + shortness of breath on ambulation  CARDIOVASCULAR: No chest pain, palpitations, dizziness, or leg swelling  GASTROINTESTINAL: No abdominal or epigastric pain  NEUROLOGICAL: No headaches, memory loss, loss of strength, numbness, or tremors        PHYSICAL EXAM:    GENERAL: NAD, well-groomed, well-developed  HEAD:  Atraumatic, Normocephalic  EYES: EOMI, PERRLA, conjunctiva and sclera clear  ENMT: No tonsillar erythema, exudates, or enlargement; Moist mucous membranes, Good dentition, No lesions  NECK: Supple, No JVD, Normal thyroid  NERVOUS SYSTEM:  Alert & Oriented X3, Good concentration; Motor Strength 5/5 B/L upper and lower extremities; DTRs 2+ intact and symmetric  CHEST/LUNG: Clear to percussion bilaterally; No rales, rhonchi, wheezing, or rubs  HEART: Irreg +m  ABDOMEN: Soft, Nontender, Nondistended; Bowel sounds present  EXTREMITIES:  2+ Peripheral Pulses, + raynauds      Serum Pro-Brain Natriuretic Peptide: 2702 pg/mL (01-14 @ 16:20)

## 2017-01-18 ENCOUNTER — APPOINTMENT (OUTPATIENT)
Dept: CT IMAGING | Facility: CLINIC | Age: 72
End: 2017-01-18

## 2017-01-18 VITALS
TEMPERATURE: 98 F | OXYGEN SATURATION: 98 % | HEART RATE: 85 BPM | SYSTOLIC BLOOD PRESSURE: 100 MMHG | DIASTOLIC BLOOD PRESSURE: 64 MMHG | RESPIRATION RATE: 17 BRPM

## 2017-01-18 DIAGNOSIS — N18.3 CHRONIC KIDNEY DISEASE, STAGE 3 (MODERATE): ICD-10-CM

## 2017-01-18 PROCEDURE — 99285 EMERGENCY DEPT VISIT HI MDM: CPT | Mod: 25

## 2017-01-18 PROCEDURE — 83880 ASSAY OF NATRIURETIC PEPTIDE: CPT

## 2017-01-18 PROCEDURE — 94640 AIRWAY INHALATION TREATMENT: CPT

## 2017-01-18 PROCEDURE — 99239 HOSP IP/OBS DSCHRG MGMT >30: CPT

## 2017-01-18 PROCEDURE — 85027 COMPLETE CBC AUTOMATED: CPT

## 2017-01-18 PROCEDURE — 71275 CT ANGIOGRAPHY CHEST: CPT

## 2017-01-18 PROCEDURE — 85610 PROTHROMBIN TIME: CPT

## 2017-01-18 PROCEDURE — 80053 COMPREHEN METABOLIC PANEL: CPT

## 2017-01-18 PROCEDURE — 93005 ELECTROCARDIOGRAM TRACING: CPT

## 2017-01-18 PROCEDURE — 93306 TTE W/DOPPLER COMPLETE: CPT

## 2017-01-18 PROCEDURE — 99223 1ST HOSP IP/OBS HIGH 75: CPT

## 2017-01-18 PROCEDURE — 85730 THROMBOPLASTIN TIME PARTIAL: CPT

## 2017-01-18 PROCEDURE — 82550 ASSAY OF CK (CPK): CPT

## 2017-01-18 PROCEDURE — 96374 THER/PROPH/DIAG INJ IV PUSH: CPT

## 2017-01-18 PROCEDURE — 84484 ASSAY OF TROPONIN QUANT: CPT

## 2017-01-18 PROCEDURE — 71045 X-RAY EXAM CHEST 1 VIEW: CPT

## 2017-01-18 RX ADMIN — Medication 100 MICROGRAM(S): at 05:06

## 2017-01-18 RX ADMIN — AMLODIPINE BESYLATE 5 MILLIGRAM(S): 2.5 TABLET ORAL at 05:06

## 2017-01-18 RX ADMIN — DULOXETINE HYDROCHLORIDE 60 MILLIGRAM(S): 30 CAPSULE, DELAYED RELEASE ORAL at 05:06

## 2017-01-18 RX ADMIN — Medication 40 MILLIGRAM(S): at 05:07

## 2017-01-18 RX ADMIN — CARVEDILOL PHOSPHATE 6.25 MILLIGRAM(S): 80 CAPSULE, EXTENDED RELEASE ORAL at 05:06

## 2017-01-18 RX ADMIN — PANTOPRAZOLE SODIUM 40 MILLIGRAM(S): 20 TABLET, DELAYED RELEASE ORAL at 06:01

## 2017-01-18 RX ADMIN — TIOTROPIUM BROMIDE 1 CAPSULE(S): 18 CAPSULE ORAL; RESPIRATORY (INHALATION) at 09:20

## 2017-01-18 NOTE — CONSULT NOTE ADULT - SUBJECTIVE AND OBJECTIVE BOX
Renal :    Overnight events/patient complaints:    SOB resolved  No CP        PAST MEDICAL & SURGICAL HISTORY:  Anemia  Ventricular tachycardia  Cardiomyopathy  Scleroderma  Chronic back pain  Sleep apnea  Raynaud&#x27;s disease  GI bleed not requiring more than 4 units of blood in 24 hours, ICU, or surgery  Hypothyroid  Afib  MI (myocardial infarction): 3 times (2000,2002)  HLD (hyperlipidemia)  HTN (hypertension)  History of breast surgery: &quot;Lift&quot;  Spinal cord stimulator status: [employer] (2014)  AICD (automatic cardioverter/defibrillator) present: [employer] (2006)  Hernia: , Lariat procedure (2012)  Bariatric surgery status: gastric bypass 2001      Keflex (Unknown)    MEDICATIONS  (STANDING):  DULoxetine 60milliGRAM(s) Oral daily  atorvastatin 10milliGRAM(s) Oral at bedtime  carvedilol 6.25milliGRAM(s) Oral every 12 hours  amLODIPine   Tablet 5milliGRAM(s) Oral daily  pantoprazole    Tablet 40milliGRAM(s) Oral before breakfast  levothyroxine 100MICROGram(s) Oral daily  furosemide   Injectable 40milliGRAM(s) IV Push every 12 hours    MEDICATIONS  (PRN):  docusate sodium 200milliGRAM(s) Oral daily PRN Constipation  morphine  - Injectable 2milliGRAM(s) IV Push every 6 hours PRN chest pain  nitroglycerin     SubLingual 0.4milliGRAM(s) SubLingual every 5 minutes PRN Chest Pain      Vital Signs Last 24 Hrs  T(C): 36.9, Max: 36.9 (01-16 @ 05:26)  T(F): 98.4, Max: 98.4 (01-16 @ 05:26)  HR: 73 (73 - 88)  BP: 110/58 (106/58 - 112/60)  BP(mean): --  RR: 16 (16 - 17)  SpO2: 99% (96% - 99%)  ICU Vital Signs Last 24 Hrs  MARGARITO PHUC  I&O's Detail    I&O's Summary    Drug Dosing Weight  MARGARITO FRIEND      PHYSICAL EXAM:  General: Appears well developed, well nourished alert and cooperative.  HEENT: Head; normocephalic, atraumatic.  Eyes: Pupils reactive, cornea wnl.  Neck: Supple, no nodes adenopathy, no NVD or carotid bruit or thyromegaly.  CARDIOVASCULAR: Normal S1 and S2, No murmur, rub, gallop or lift.   LUNGS: No rales, rhonchi or wheeze. Normal breath sounds bilaterally.  ABDOMEN: Soft, nontender without mass or organomegaly. bowel sounds normoactive.  EXTREMITIES: No clubbing, cyanosis or edema. Distal pulses wnl.   SKIN: warm and dry with normal turgor.  NEURO: Alert/oriented x 3/normal motor exam. No pathologic reflexes.    PSYCH: normal affect.        LABS:                        8.6    6.48  )-----------( 396      ( 14 Jan 2017 16:20 )             27.6     14 Jan 2017 16:20    142    |  102    |  17.0   ----------------------------<  99     4.0     |  26.0   |  0.98     Ca    9.2        14 Jan 2017 16:20    TPro  6.9    /  Alb  4.0    /  TBili  0.5    /  DBili  x      /  AST  23     /  ALT  19     /  AlkPhos  58     14 Jan 2017 16:20    MARGARITO FRIEND  CARDIAC MARKERS ( 16 Jan 2017 05:17 )  x     / <0.01 ng/mL / 66 U/L / x     / x      CARDIAC MARKERS ( 14 Jan 2017 16:20 )  x     / <0.01 ng/mL / 75 U/L / x     / x          PT/INR - ( 14 Jan 2017 16:20 )   PT: 12.1 sec;   INR: 1.10 ratio         PTT - ( 14 Jan 2017 16:20 )  PTT:32.0 sec      RADIOLOGY & ADDITIONAL STUDIES:    INTERPRETATION OF TELEMETRY (personally reviewed):    ECG:    ECHO:   2D AND M-MODE MEASUREMENTS (normal ranges within parentheses):  Left                Normal    Aorta/Left           Normal  Ventricle:                    Atrium:  IVSd (2D):    1.01  (0.7-1.1) Aortic Root   3.20   (2.4-3.7)                cm              (Mmode):      cm  LVPWd (2D):   1.00  (0.7-1.1) AoV Cusp      1.60   (1.5-2.6)                cm              Separation:   cm  LVIDd (2D):   4.93  (3.4-5.7) LA Volume     91.1                cm              Index         ml/m²  LVIDs (2D):   3.88            Right Ventricle:                cm              TAPSE:           1.06 cm  LV FS (2D):   21.3  (>25%)                %  Relative Wall 0.41  (<0.42)  Thickness     LV SYSTOLIC FUNCTION BY 2D PLANIMETRY (MOD):  EF-Biplane: 51.0 %     LV DIASTOLIC FUNCTION:  MV Peak E: 0.95 m/s e', MV Florencia: 0.07 m/s                      E/e' Ratio: 12.86     SPECTRAL DOPPLER ANALYSIS (where applicable):  Mitral Insufficiency by PISA:  MR Volume: 45.00 ml MR Flow Rate: 157.87 ml/s MR EROA: 27.81 mm²     Tricuspid Valve and PA/RV Systolic Pressure: TR Max Velocity: 2.82 m/s   RA Pressure: 15 mmHg RVSP/PASP: 46.8 mmHg        PHYSICIAN INTERPRETATION:  Left Ventricle: The left ventricular internal cavity size is normal. Left   ventricular wall thickness is normal.  Global LV systolic function was normal. Left ventricular ejection   fraction, by visual estimation, is 45 to 50%.  Right Ventricle: Normal right ventricular size and function. TV S' 1.2   m/s.  Left Atrium: Severely enlarged left atrium.  Right Atrium: The right atrium is moderately dilated.  Pericardium: There is no evidence of pericardial effusion. There is a   moderate pleural effusion in both left and right lateral regions.  Mitral Valve: Thickening of the anterior and posterior mitral valve   leaflets. Severe mitral annular dilatation. Moderate to severe mitral   valve regurgitation is seen. The MR jet is posteriorly-directed.  Tricuspid Valve: The tricuspid valve is normal in structure. Moderate   tricuspid regurgitation is visualized. Estimated pulmonary artery   systolic pressure is 46.8 mmHg assuming a right atrial pressure of 15   mmHg, which is consistent with mild pulmonary hypertension.  Aortic Valve: The aortic valve is trileaflet. Sclerotic aortic valve with   normal opening. Trivial aortic valve regurgitation is seen.  Pulmonic Valve: The pulmonic valve was not well visualized. Trace   pulmonic valve regurgitation.  Aorta: The aortic root is normal in size and structure.  Pulmonary Artery: The pulmonary artery is moderately dilated.  Venous: A systolic blunting flow pattern is recorded from the right upper   pulmonary vein. The inferior vena cava was dilated, with respiratory size   variation less than 50%.  Additional Comments: A pacer wire is visualized in the right ventricle   and right atrium.        Summary:   1. Severely enlarged left atrium.   2. Left ventricular ejection fraction, by visual estimation, is 45 to   50%.   3. Moderately dilated right atrium.   4. Normal right ventricular size and function.   5. Moderate to severe mitral valve regurgitation.   6. Moderate tricuspid regurgitation.   7. Estimated pulmonary artery systolic pressure is 46.8 mmHg - mild   pulmonary hypertension.   8. There is no evidence of pericardial effusion.   9. Moderate pleural effusion in both left and right lateral regions.  10. Compared to study on April 2013, the LV function improved, but MR has   significantly worsen.  ASSESSMENT AND PLAN:  In summary, MARGARITO FRIEND is an 71y Female PMHx of  NICMP s/p AICD 2006, PAF last LVEF 60% who had been doing well recently up to the last few weeks when her Lasix and Coreg dose had been decreased due to low BP c/o SOB, SUAREZ and LE edema for the last few days that got worse yesterday. Found with evidence of Acute on chronic CHF     AICD  Interrogated, today,  OP F.U    Thank you for allowing [facility]  to participate in the care of this patient.  Please feel free to call with any questions or concerns.

## 2017-01-18 NOTE — PROGRESS NOTE ADULT - PROBLEM SELECTOR PROBLEM 3
Microcytic anemia
Non-ST elevation (NSTEMI) myocardial infarction
Renal failure, chronic, stage 3 (moderate)
Acute on chronic congestive heart failure, unspecified congestive heart failure type

## 2017-01-18 NOTE — PROGRESS NOTE ADULT - PROBLEM SELECTOR PROBLEM 2
Acute on chronic congestive heart failure, unspecified congestive heart failure type
Chronic atrial fibrillation
Raynaud's disease without gangrene
Dilated cardiomyopathy

## 2017-01-18 NOTE — PROGRESS NOTE ADULT - PROBLEM SELECTOR PROBLEM 1
Acute on chronic congestive heart failure, unspecified congestive heart failure type
Acute on chronic congestive heart failure, unspecified congestive heart failure type
Dilated cardiomyopathy
SOB (shortness of breath)

## 2017-01-18 NOTE — CONSULT NOTE ADULT - ATTENDING COMMENTS
Discussed w. Dr. Perry,
CTA  [facility]  [facility]  FU with PFT in our office as O/P - Has an appointment with Dr Faustin

## 2017-01-18 NOTE — PROGRESS NOTE ADULT - SUBJECTIVE AND OBJECTIVE BOX
AICD interrogated NL Fx , had 5 months battery life    Patient in Afib since last december , not candidate for chronic AC had Hx of Lariat SHEILA occluder placement    OK to DC home will follow at office

## 2017-01-18 NOTE — PROGRESS NOTE ADULT - SUBJECTIVE AND OBJECTIVE BOX
[place], THE HEART CENTER                                   [address] [place] [address]                                                      PHONE: [phone]                                                         FAX: [phone]  [email or web address]  ---------------------------------------------------------------------------------------------------------------------------------    Overnight events/patient complaints:    SOB resolved  No CP        PAST MEDICAL & SURGICAL HISTORY:  Anemia  Ventricular tachycardia  Cardiomyopathy  Scleroderma  Chronic back pain  Sleep apnea  Raynaud&#x27;s disease  GI bleed not requiring more than 4 units of blood in 24 hours, ICU, or surgery  Hypothyroid  Afib  MI (myocardial infarction): 3 times (2000,2002)  HLD (hyperlipidemia)  HTN (hypertension)  History of breast surgery: &quot;Lift&quot;  Spinal cord stimulator status: [employer] (2014)  AICD (automatic cardioverter/defibrillator) present: [employer] (2006)  Hernia: , Lariat procedure (2012)  Bariatric surgery status: gastric bypass 2001      Keflex (Unknown)    MEDICATIONS  (STANDING):  DULoxetine 60milliGRAM(s) Oral daily  atorvastatin 10milliGRAM(s) Oral at bedtime  carvedilol 6.25milliGRAM(s) Oral every 12 hours  amLODIPine   Tablet 5milliGRAM(s) Oral daily  pantoprazole    Tablet 40milliGRAM(s) Oral before breakfast  levothyroxine 100MICROGram(s) Oral daily  furosemide   Injectable 40milliGRAM(s) IV Push every 12 hours    MEDICATIONS  (PRN):  docusate sodium 200milliGRAM(s) Oral daily PRN Constipation  morphine  - Injectable 2milliGRAM(s) IV Push every 6 hours PRN chest pain  nitroglycerin     SubLingual 0.4milliGRAM(s) SubLingual every 5 minutes PRN Chest Pain      Vital Signs Last 24 Hrs  T(C): 36.9, Max: 36.9 (01-16 @ 05:26)  T(F): 98.4, Max: 98.4 (01-16 @ 05:26)  HR: 73 (73 - 88)  BP: 110/58 (106/58 - 112/60)  BP(mean): --  RR: 16 (16 - 17)  SpO2: 99% (96% - 99%)  ICU Vital Signs Last 24 Hrs  MARGARITO PHUC  I&O's Detail    I&O's Summary    Drug Dosing Weight  MARGARITO FRIEND      PHYSICAL EXAM:  General: Appears well developed, well nourished alert and cooperative.  HEENT: Head; normocephalic, atraumatic.  Eyes: Pupils reactive, cornea wnl.  Neck: Supple, no nodes adenopathy, no NVD or carotid bruit or thyromegaly.  CARDIOVASCULAR: Normal S1 and S2, No murmur, rub, gallop or lift.   LUNGS: No rales, rhonchi or wheeze. Normal breath sounds bilaterally.  ABDOMEN: Soft, nontender without mass or organomegaly. bowel sounds normoactive.  EXTREMITIES: No clubbing, cyanosis or edema. Distal pulses wnl.   SKIN: warm and dry with normal turgor.  NEURO: Alert/oriented x 3/normal motor exam. No pathologic reflexes.    PSYCH: normal affect.        LABS:                        8.6    6.48  )-----------( 396      ( 14 Jan 2017 16:20 )             27.6     14 Jan 2017 16:20    142    |  102    |  17.0   ----------------------------<  99     4.0     |  26.0   |  0.98     Ca    9.2        14 Jan 2017 16:20    TPro  6.9    /  Alb  4.0    /  TBili  0.5    /  DBili  x      /  AST  23     /  ALT  19     /  AlkPhos  58     14 Jan 2017 16:20    MARGARITO PHUC  CARDIAC MARKERS ( 16 Jan 2017 05:17 )  x     / <0.01 ng/mL / 66 U/L / x     / x      CARDIAC MARKERS ( 14 Jan 2017 16:20 )  x     / <0.01 ng/mL / 75 U/L / x     / x          PT/INR - ( 14 Jan 2017 16:20 )   PT: 12.1 sec;   INR: 1.10 ratio         PTT - ( 14 Jan 2017 16:20 )  PTT:32.0 sec      RADIOLOGY & ADDITIONAL STUDIES:    INTERPRETATION OF TELEMETRY (personally reviewed):    ECG:    ECHO:   2D AND M-MODE MEASUREMENTS (normal ranges within parentheses):  Left                Normal    Aorta/Left           Normal  Ventricle:                    Atrium:  IVSd (2D):    1.01  (0.7-1.1) Aortic Root   3.20   (2.4-3.7)                cm              (Mmode):      cm  LVPWd (2D):   1.00  (0.7-1.1) AoV Cusp      1.60   (1.5-2.6)                cm              Separation:   cm  LVIDd (2D):   4.93  (3.4-5.7) LA Volume     91.1                cm              Index         ml/m²  LVIDs (2D):   3.88            Right Ventricle:                cm              TAPSE:           1.06 cm  LV FS (2D):   21.3  (>25%)                %  Relative Wall 0.41  (<0.42)  Thickness     LV SYSTOLIC FUNCTION BY 2D PLANIMETRY (MOD):  EF-Biplane: 51.0 %     LV DIASTOLIC FUNCTION:  MV Peak E: 0.95 m/s e', MV Florencia: 0.07 m/s                      E/e' Ratio: 12.86     SPECTRAL DOPPLER ANALYSIS (where applicable):  Mitral Insufficiency by PISA:  MR Volume: 45.00 ml MR Flow Rate: 157.87 ml/s MR EROA: 27.81 mm²     Tricuspid Valve and PA/RV Systolic Pressure: TR Max Velocity: 2.82 m/s   RA Pressure: 15 mmHg RVSP/PASP: 46.8 mmHg        PHYSICIAN INTERPRETATION:  Left Ventricle: The left ventricular internal cavity size is normal. Left   ventricular wall thickness is normal.  Global LV systolic function was normal. Left ventricular ejection   fraction, by visual estimation, is 45 to 50%.  Right Ventricle: Normal right ventricular size and function. TV S' 1.2   m/s.  Left Atrium: Severely enlarged left atrium.  Right Atrium: The right atrium is moderately dilated.  Pericardium: There is no evidence of pericardial effusion. There is a   moderate pleural effusion in both left and right lateral regions.  Mitral Valve: Thickening of the anterior and posterior mitral valve   leaflets. Severe mitral annular dilatation. Moderate to severe mitral   valve regurgitation is seen. The MR jet is posteriorly-directed.  Tricuspid Valve: The tricuspid valve is normal in structure. Moderate   tricuspid regurgitation is visualized. Estimated pulmonary artery   systolic pressure is 46.8 mmHg assuming a right atrial pressure of 15   mmHg, which is consistent with mild pulmonary hypertension.  Aortic Valve: The aortic valve is trileaflet. Sclerotic aortic valve with   normal opening. Trivial aortic valve regurgitation is seen.  Pulmonic Valve: The pulmonic valve was not well visualized. Trace   pulmonic valve regurgitation.  Aorta: The aortic root is normal in size and structure.  Pulmonary Artery: The pulmonary artery is moderately dilated.  Venous: A systolic blunting flow pattern is recorded from the right upper   pulmonary vein. The inferior vena cava was dilated, with respiratory size   variation less than 50%.  Additional Comments: A pacer wire is visualized in the right ventricle   and right atrium.        Summary:   1. Severely enlarged left atrium.   2. Left ventricular ejection fraction, by visual estimation, is 45 to   50%.   3. Moderately dilated right atrium.   4. Normal right ventricular size and function.   5. Moderate to severe mitral valve regurgitation.   6. Moderate tricuspid regurgitation.   7. Estimated pulmonary artery systolic pressure is 46.8 mmHg - mild   pulmonary hypertension.   8. There is no evidence of pericardial effusion.   9. Moderate pleural effusion in both left and right lateral regions.  10. Compared to study on April 2013, the LV function improved, but MR has   significantly worsen.  ASSESSMENT AND PLAN:  In summary, MARGARITO FRIEND is an 71y Female PMHx of  NICMP s/p AICD 2006, PAF last LVEF 60% who had been doing well recently up to the last few weeks when her Lasix and Coreg dose had been decreased due to low BP c/o SOB, SUAREZ and LE edema for the last few days that got worse yesterday. Found with evidence of Acute on chronic CHF     Will interrogate AICD today prior to DC  continue remaining cardiac therapy    Thank you for allowing [facility] to participate in the care of this patient.  Please feel free to call with any questions or concerns.

## 2017-01-18 NOTE — PROGRESS NOTE ADULT - SUBJECTIVE AND OBJECTIVE BOX
Patient is a 71y old  Female who presents with a chief complaint of sob, leg swelling (15 Milo 2017 13:36)      INTERVAL HPI/OVERNIGHT EVENTS:  71y  Female    ANTIMICROBIAL:    CARDIOVASCULAR:  carvedilol 6.25milliGRAM(s) Oral every 12 hours  amLODIPine   Tablet 5milliGRAM(s) Oral daily  furosemide   Injectable 40milliGRAM(s) IV Push every 12 hours  nitroglycerin     SubLingual 0.4milliGRAM(s) SubLingual every 5 minutes PRN    PULMONARY:  tiotropium 18 MICROgram(s) Capsule 1Capsule(s) Inhalation daily    NEUROLOGIC:  DULoxetine 60milliGRAM(s) Oral daily  morphine  - Injectable 2milliGRAM(s) IV Push every 6 hours PRN  acetaminophen   Tablet. 325milliGRAM(s) Oral every 4 hours PRN    ONCOLOGIC:    HEMATOLOGIC:  aspirin enteric coated 81milliGRAM(s) Oral daily    GATROINTESTINAL:  docusate sodium 200milliGRAM(s) Oral daily PRN  pantoprazole    Tablet 40milliGRAM(s) Oral before breakfast     MEDS:    ENDO/METABOLIC:  levothyroxine 100MICROGram(s) Oral daily  atorvastatin 40milliGRAM(s) Oral at bedtime    IV FLUID/NUTRITION:    TOPICAL:    IMMUNOLOGIC & OTHER      Allergies    Keflex (Unknown)    Intolerances        Vital Signs Last 24 Hrs  T(C): 36.8, Max: 36.8 ( @ 23:01)  T(F): 98.3, Max: 98.3 ( @ 23:01)  HR: 83 (78 - 96)  BP: 110/58 (94/52 - 110/68)  BP(mean): --  RR: 19 (18 - 19)  SpO2: 96% (96% - 98%)      Daily     Daily Weight in k.1 (2017 05:32)  I&O's Detail    Last Menstrual Period        REVIEW OF SYSTEMS:    CONSTITUTIONAL: No fever, weight loss, or fatigue  EYES: No eye pain, visual disturbances, or discharge  ENMT:  No difficulty hearing, tinnitus, vertigo; No sinus or throat pain  NECK: No pain or stiffness  RESPIRATORY: No cough, wheezing, chills or hemoptysis; No shortness of breath  CARDIOVASCULAR: No chest pain, palpitations, dizziness, or leg swelling  GASTROINTESTINAL: No abdominal or epigastric pain. No nausea, vomiting, or hematemesis; No diarrhea or constipation. No melena or hematochezia.  NEUROLOGICAL: No headaches, memory loss, loss of strength, numbness, or tremors  SKIN: No itching, burning, rashes, or lesions   LYMPH NODES: No enlarged glands  MUSCULOSKELETAL: No joint pain or swelling; No muscle, back, or extremity pain        PHYSICAL EXAM:    GENERAL: NAD, well-groomed, well-developed  HEAD:  Atraumatic, Normocephalic  EYES: EOMI, PERRLA, conjunctiva and sclera clear  ENMT: No tonsillar erythema, exudates, or enlargement; Moist mucous membranes, Good dentition, No lesions  NECK: Supple, No JVD, Normal thyroid  NERVOUS SYSTEM:  Alert & Oriented X3, Good concentration; Motor Strength 5/5 B/L upper and lower extremities; DTRs 2+ intact and symmetric  CHEST/LUNG: Clear to percussion bilaterally; No rales, rhonchi, wheezing, or rubs  HEART: Irreg, + murmur   ABDOMEN: Soft, Nontender, Nondistended; Bowel sounds present  EXTREMITIES:  2+ Peripheral Pulses, No clubbing, cyanosis, or edema  LYMPH: No lymphadenopathy noted                          :

## 2017-01-23 ENCOUNTER — APPOINTMENT (OUTPATIENT)
Dept: PULMONOLOGY | Facility: CLINIC | Age: 72
End: 2017-01-23

## 2017-01-23 VITALS — BODY MASS INDEX: 29.3 KG/M2 | DIASTOLIC BLOOD PRESSURE: 70 MMHG | SYSTOLIC BLOOD PRESSURE: 108 MMHG | WEIGHT: 150 LBS

## 2017-01-25 ENCOUNTER — APPOINTMENT (OUTPATIENT)
Dept: FAMILY MEDICINE | Facility: CLINIC | Age: 72
End: 2017-01-25

## 2017-02-13 ENCOUNTER — APPOINTMENT (OUTPATIENT)
Dept: FAMILY MEDICINE | Facility: CLINIC | Age: 72
End: 2017-02-13

## 2017-02-13 VITALS
HEIGHT: 60 IN | HEART RATE: 78 BPM | WEIGHT: 150 LBS | RESPIRATION RATE: 16 BRPM | SYSTOLIC BLOOD PRESSURE: 90 MMHG | DIASTOLIC BLOOD PRESSURE: 60 MMHG | BODY MASS INDEX: 29.45 KG/M2

## 2017-02-13 DIAGNOSIS — Z87.39 PERSONAL HISTORY OF OTHER DISEASES OF THE MUSCULOSKELETAL SYSTEM AND CONNECTIVE TISSUE: ICD-10-CM

## 2017-02-13 DIAGNOSIS — Z87.891 PERSONAL HISTORY OF NICOTINE DEPENDENCE: ICD-10-CM

## 2017-02-13 DIAGNOSIS — Z86.010 PERSONAL HISTORY OF COLONIC POLYPS: ICD-10-CM

## 2017-02-16 ENCOUNTER — OUTPATIENT (OUTPATIENT)
Dept: OUTPATIENT SERVICES | Facility: HOSPITAL | Age: 72
LOS: 1 days | End: 2017-02-16
Payer: MEDICARE

## 2017-02-16 VITALS
HEIGHT: 61 IN | OXYGEN SATURATION: 100 % | TEMPERATURE: 97 F | RESPIRATION RATE: 18 BRPM | WEIGHT: 149.91 LBS | DIASTOLIC BLOOD PRESSURE: 64 MMHG | SYSTOLIC BLOOD PRESSURE: 113 MMHG | HEART RATE: 89 BPM

## 2017-02-16 VITALS
DIASTOLIC BLOOD PRESSURE: 64 MMHG | SYSTOLIC BLOOD PRESSURE: 113 MMHG | WEIGHT: 149.91 LBS | HEIGHT: 61 IN | HEART RATE: 89 BPM | TEMPERATURE: 97 F | OXYGEN SATURATION: 100 %

## 2017-02-16 DIAGNOSIS — Z95.810 PRESENCE OF AUTOMATIC (IMPLANTABLE) CARDIAC DEFIBRILLATOR: Chronic | ICD-10-CM

## 2017-02-16 DIAGNOSIS — Z98.89 OTHER SPECIFIED POSTPROCEDURAL STATES: Chronic | ICD-10-CM

## 2017-02-16 DIAGNOSIS — Z01.810 ENCOUNTER FOR PREPROCEDURAL CARDIOVASCULAR EXAMINATION: ICD-10-CM

## 2017-02-16 DIAGNOSIS — Q20.8 OTHER CONGENITAL MALFORMATIONS OF CARDIAC CHAMBERS AND CONNECTIONS: Chronic | ICD-10-CM

## 2017-02-16 LAB
ANION GAP SERPL CALC-SCNC: 14 MMOL/L — SIGNIFICANT CHANGE UP (ref 5–17)
BUN SERPL-MCNC: 27 MG/DL — HIGH (ref 8–20)
CALCIUM SERPL-MCNC: 8.8 MG/DL — SIGNIFICANT CHANGE UP (ref 8.6–10.2)
CHLORIDE SERPL-SCNC: 96 MMOL/L — LOW (ref 98–107)
CHOLEST SERPL-MCNC: 116 MG/DL — SIGNIFICANT CHANGE UP (ref 110–199)
CO2 SERPL-SCNC: 27 MMOL/L — SIGNIFICANT CHANGE UP (ref 22–29)
CREAT SERPL-MCNC: 1.49 MG/DL — HIGH (ref 0.5–1.3)
GLUCOSE SERPL-MCNC: 75 MG/DL — SIGNIFICANT CHANGE UP (ref 70–115)
HCT VFR BLD CALC: 24.7 % — LOW (ref 37–47)
HDLC SERPL-MCNC: 62 MG/DL — LOW
HGB BLD-MCNC: 7.9 G/DL — LOW (ref 12–16)
INR BLD: 1.1 RATIO — SIGNIFICANT CHANGE UP (ref 0.88–1.16)
LIPID PNL WITH DIRECT LDL SERPL: 46 MG/DL — SIGNIFICANT CHANGE UP
MAGNESIUM SERPL-MCNC: 2.4 MG/DL — SIGNIFICANT CHANGE UP (ref 1.8–2.5)
MCHC RBC-ENTMCNC: 24.1 PG — LOW (ref 27–31)
MCHC RBC-ENTMCNC: 32 G/DL — SIGNIFICANT CHANGE UP (ref 32–36)
MCV RBC AUTO: 75.3 FL — LOW (ref 81–99)
PLATELET # BLD AUTO: 367 K/UL — SIGNIFICANT CHANGE UP (ref 150–400)
POTASSIUM SERPL-MCNC: 4 MMOL/L — SIGNIFICANT CHANGE UP (ref 3.5–5.3)
POTASSIUM SERPL-SCNC: 4 MMOL/L — SIGNIFICANT CHANGE UP (ref 3.5–5.3)
PROTHROM AB SERPL-ACNC: 12.1 SEC — SIGNIFICANT CHANGE UP (ref 10–13.1)
RBC # BLD: 3.28 M/UL — LOW (ref 4.4–5.2)
RBC # FLD: 18.7 % — HIGH (ref 11–15.6)
SODIUM SERPL-SCNC: 137 MMOL/L — SIGNIFICANT CHANGE UP (ref 135–145)
TOTAL CHOLESTEROL/HDL RATIO MEASUREMENT: 2 RATIO — LOW (ref 3.3–7.1)
TRIGL SERPL-MCNC: 42 MG/DL — SIGNIFICANT CHANGE UP (ref 10–200)
WBC # BLD: 4.5 K/UL — LOW (ref 4.8–10.8)
WBC # FLD AUTO: 4.5 K/UL — LOW (ref 4.8–10.8)

## 2017-02-16 PROCEDURE — 93010 ELECTROCARDIOGRAM REPORT: CPT

## 2017-02-16 PROCEDURE — 80061 LIPID PANEL: CPT

## 2017-02-16 PROCEDURE — 80048 BASIC METABOLIC PNL TOTAL CA: CPT

## 2017-02-16 PROCEDURE — 83735 ASSAY OF MAGNESIUM: CPT

## 2017-02-16 PROCEDURE — 93005 ELECTROCARDIOGRAM TRACING: CPT

## 2017-02-16 PROCEDURE — 85027 COMPLETE CBC AUTOMATED: CPT

## 2017-02-16 PROCEDURE — G0463: CPT

## 2017-02-16 PROCEDURE — 85610 PROTHROMBIN TIME: CPT

## 2017-02-16 NOTE — H&P CARDIOLOGY - FAMILY HISTORY
Sibling  Still living? Unknown  Family history of heart disease, Age at diagnosis: Age Unknown     Child  Still living? Yes, Estimated age: Age Unknown  Family history of heart disease, Age at diagnosis: Age Unknown  Family history of heart valve abnormality, Age at diagnosis: Age Unknown

## 2017-02-16 NOTE — H&P CARDIOLOGY - COMMENTS
Anemia: h/h 7.5/28.4. Cr also 1.49. I spoke with Dr Banks who states she will follow up with patient prior to MAGNOLIA on tuesday. Blood work faxed to her office as requested.

## 2017-02-16 NOTE — H&P CARDIOLOGY - HISTORY OF PRESENT ILLNESS
72 y/o female with hx afib, s/p Lariat 2012, recurrent GIB unable to tolerate A/C or aspirin, NICM, AICD (2006), anemia secondary to gastric band surgery 2001, recently admitted for acute on chronic CHF after noting LE swelling, chest pain and SOB after her diuretic was adjusted secondary to decreased BP. She does have c/o palpitations and "skipped beats", SUAREZ especially with stairs, occasional dizziness, she ambulates with a cane. She also c/o one episode of pre-syncope about one year ago. Pt has a history of MR and had an echo which showed:      1. Severely enlarged left atrium.   2. Left ventricular ejection fraction, by visual estimation, is 45 to   50%.   3. Moderately dilated right atrium.   4. Normal right ventricular size and function.   5. Moderate to severe mitral valve regurgitation.   6. Moderate tricuspid regurgitation.   7. Estimated pulmonary artery systolic pressure is 46.8 mmHg - mild   pulmonary hypertension.   8. There is no evidence of pericardial effusion.   9. Moderate pleural effusion in both left and right lateral regions.  10. Compared to study on April 2013, the LV function improved, but MR has   significantly worsen.    Pt presents for PST to return for MAGNOLIA to further evaluate mitral valve disease.     Cardiologist: Jocelyn  PMD: Orlando 72 y/o female with hx afib, s/p Lariat 2012, recurrent GIB unable to tolerate A/C or aspirin, NICM, AICD (2006), anemia secondary to gastric band surgery 2001, recently admitted for acute on chronic CHF after noting LE swelling, chest pain and SOB after her diuretic was adjusted secondary to decreased BP. She now continues to be maintained on her original dose of furosemide and is pending a nephrology consult next week. She does have c/o palpitations and "skipped beats", SUAREZ especially with stairs, occasional dizziness, she ambulates with a cane. She also c/o one episode of pre-syncope about one year ago. Pt has a history of MR and had an echo which showed:      1. Severely enlarged left atrium.   2. Left ventricular ejection fraction, by visual estimation, is 45 to   50%.   3. Moderately dilated right atrium.   4. Normal right ventricular size and function.   5. Moderate to severe mitral valve regurgitation.   6. Moderate tricuspid regurgitation.   7. Estimated pulmonary artery systolic pressure is 46.8 mmHg - mild   pulmonary hypertension.   8. There is no evidence of pericardial effusion.   9. Moderate pleural effusion in both left and right lateral regions.  10. Compared to study on April 2013, the LV function improved, but MR has   significantly worsen.    Pt presents for PST to return for MAGNOLIA to further evaluate mitral valve disease.     Cardiologist: Jocelyn  PMD: Orlando

## 2017-02-16 NOTE — H&P CARDIOLOGY - PSH
Abnormality of left atrial appendage  s/p Lariat 2012  AICD (automatic cardioverter/defibrillator) present  [employer] (2006)  Bariatric surgery status  gastric bypass 2001  Hernia  x2  History of breast surgery  "Lift"  Spinal cord stimulator status  [employer] (2014) recent battery change

## 2017-02-16 NOTE — ASU PATIENT PROFILE, ADULT - PSH
AICD (automatic cardioverter/defibrillator) present  [employer] (2006)  Bariatric surgery status  gastric bypass 2001  Hernia  , Lariat procedure (2012)  History of breast surgery  "Lift"  Spinal cord stimulator status  [employer] (2014) AICD (automatic cardioverter/defibrillator) present  [employer] (2006)  Bariatric surgery status  gastric bypass 2001  Hernia  , Lariat procedure (2012)  History of breast surgery  "Lift"  Spinal cord stimulator status  [employer] (2014)

## 2017-02-16 NOTE — H&P CARDIOLOGY - PMH
Afib    Anemia    Arthritis    Cardiomyopathy    CHF (congestive heart failure)  last episode  jan 2017   at  [facility], treated with diuretcss  Chronic back pain    COPD (chronic obstructive pulmonary disease)    Ejection fraction < 50%    GI bleed not requiring more than 4 units of blood in 24 hours, ICU, or surgery    HLD (hyperlipidemia)    HTN (hypertension)    Hypothyroid    MI (myocardial infarction)  3 times (2000,2002)  MR (mitral regurgitation)  moderate to severe  Raynaud's disease    Scleroderma    Sleep apnea  uses oral plate  Ventricular tachycardia

## 2017-02-16 NOTE — ASU PATIENT PROFILE, ADULT - PMH
Afib    Anemia    Cardiomyopathy    Chronic back pain    GI bleed not requiring more than 4 units of blood in 24 hours, ICU, or surgery    HLD (hyperlipidemia)    HTN (hypertension)    Hypothyroid    MI (myocardial infarction)  3 times (2000,2002)  Raynaud's disease    Scleroderma    Sleep apnea    Ventricular tachycardia Afib    Anemia    Cardiomyopathy    CHF (congestive heart failure)  last episode  jan 2017   at  [facility], treated with diuretcss  Chronic back pain    Ejection fraction < 50%    GI bleed not requiring more than 4 units of blood in 24 hours, ICU, or surgery    HLD (hyperlipidemia)    HTN (hypertension)    Hypothyroid    MI (myocardial infarction)  3 times (2000,2002)  MR (mitral regurgitation)  moderate to severe  Raynaud's disease    Scleroderma    Sleep apnea  uses oral plate  Ventricular tachycardia

## 2017-02-17 DIAGNOSIS — I25.10 ATHEROSCLEROTIC HEART DISEASE OF NATIVE CORONARY ARTERY WITHOUT ANGINA PECTORIS: ICD-10-CM

## 2017-02-17 DIAGNOSIS — Z01.810 ENCOUNTER FOR PREPROCEDURAL CARDIOVASCULAR EXAMINATION: ICD-10-CM

## 2017-02-20 ENCOUNTER — APPOINTMENT (OUTPATIENT)
Dept: NEPHROLOGY | Facility: CLINIC | Age: 72
End: 2017-02-20

## 2017-02-20 VITALS
HEIGHT: 60 IN | DIASTOLIC BLOOD PRESSURE: 60 MMHG | WEIGHT: 152 LBS | BODY MASS INDEX: 29.84 KG/M2 | SYSTOLIC BLOOD PRESSURE: 100 MMHG

## 2017-02-20 DIAGNOSIS — K92.2 GASTROINTESTINAL HEMORRHAGE, UNSPECIFIED: ICD-10-CM

## 2017-02-21 LAB
APPEARANCE: ABNORMAL
BACTERIA: NEGATIVE
BILIRUBIN URINE: NEGATIVE
BLOOD URINE: NEGATIVE
CALCIUM OXALATE CRYSTALS: ABNORMAL
COLOR: YELLOW
CREAT SPEC-SCNC: 50 MG/DL
CREAT/PROT UR: 0.2 RATIO
GLUCOSE QUALITATIVE U: NORMAL MG/DL
HYALINE CASTS: 0 /LPF
KETONES URINE: NEGATIVE
LEUKOCYTE ESTERASE URINE: NEGATIVE
MICROSCOPIC-UA: NORMAL
NITRITE URINE: NEGATIVE
PH URINE: 6
PROT UR-MCNC: 10 MG/DL
PROTEIN URINE: NEGATIVE MG/DL
RED BLOOD CELLS URINE: 1 /HPF
SPECIFIC GRAVITY URINE: 1.01
SQUAMOUS EPITHELIAL CELLS: 0 /HPF
UROBILINOGEN URINE: NORMAL MG/DL
WHITE BLOOD CELLS URINE: 0 /HPF

## 2017-02-28 ENCOUNTER — APPOINTMENT (OUTPATIENT)
Dept: ELECTROPHYSIOLOGY | Facility: CLINIC | Age: 72
End: 2017-02-28

## 2017-03-01 ENCOUNTER — FORM ENCOUNTER (OUTPATIENT)
Age: 72
End: 2017-03-01

## 2017-03-02 ENCOUNTER — OUTPATIENT (OUTPATIENT)
Dept: OUTPATIENT SERVICES | Facility: HOSPITAL | Age: 72
LOS: 1 days | End: 2017-03-02
Payer: MEDICARE

## 2017-03-02 ENCOUNTER — APPOINTMENT (OUTPATIENT)
Dept: CT IMAGING | Facility: CLINIC | Age: 72
End: 2017-03-02

## 2017-03-02 ENCOUNTER — APPOINTMENT (OUTPATIENT)
Dept: ULTRASOUND IMAGING | Facility: CLINIC | Age: 72
End: 2017-03-02

## 2017-03-02 DIAGNOSIS — Z00.8 ENCOUNTER FOR OTHER GENERAL EXAMINATION: ICD-10-CM

## 2017-03-02 DIAGNOSIS — Z95.810 PRESENCE OF AUTOMATIC (IMPLANTABLE) CARDIAC DEFIBRILLATOR: Chronic | ICD-10-CM

## 2017-03-02 DIAGNOSIS — Z98.89 OTHER SPECIFIED POSTPROCEDURAL STATES: Chronic | ICD-10-CM

## 2017-03-02 DIAGNOSIS — E78.5 HYPERLIPIDEMIA, UNSPECIFIED: ICD-10-CM

## 2017-03-02 DIAGNOSIS — J90 PLEURAL EFFUSION, NOT ELSEWHERE CLASSIFIED: ICD-10-CM

## 2017-03-02 DIAGNOSIS — Q20.8 OTHER CONGENITAL MALFORMATIONS OF CARDIAC CHAMBERS AND CONNECTIONS: Chronic | ICD-10-CM

## 2017-03-02 PROCEDURE — 76770 US EXAM ABDO BACK WALL COMP: CPT

## 2017-03-02 PROCEDURE — 71250 CT THORAX DX C-: CPT

## 2017-03-02 PROCEDURE — 76775 US EXAM ABDO BACK WALL LIM: CPT

## 2017-03-06 ENCOUNTER — APPOINTMENT (OUTPATIENT)
Dept: FAMILY MEDICINE | Facility: CLINIC | Age: 72
End: 2017-03-06

## 2017-03-06 VITALS
DIASTOLIC BLOOD PRESSURE: 60 MMHG | SYSTOLIC BLOOD PRESSURE: 100 MMHG | HEIGHT: 60 IN | WEIGHT: 152 LBS | BODY MASS INDEX: 29.84 KG/M2

## 2017-03-06 DIAGNOSIS — K57.90 DIVERTICULOSIS OF INTESTINE, PART UNSPECIFIED, W/OUT PERFORATION OR ABSCESS W/OUT BLEEDING: ICD-10-CM

## 2017-03-06 DIAGNOSIS — Z87.891 PERSONAL HISTORY OF NICOTINE DEPENDENCE: ICD-10-CM

## 2017-03-06 DIAGNOSIS — Z87.81 PERSONAL HISTORY OF (HEALED) TRAUMATIC FRACTURE: ICD-10-CM

## 2017-03-06 DIAGNOSIS — Z00.00 ENCOUNTER FOR GENERAL ADULT MEDICAL EXAMINATION W/OUT ABNORMAL FINDINGS: ICD-10-CM

## 2017-03-06 DIAGNOSIS — I73.00 RAYNAUD'S SYNDROME W/OUT GANGRENE: ICD-10-CM

## 2017-03-07 ENCOUNTER — RX RENEWAL (OUTPATIENT)
Age: 72
End: 2017-03-07

## 2017-03-23 ENCOUNTER — APPOINTMENT (OUTPATIENT)
Dept: PULMONOLOGY | Facility: CLINIC | Age: 72
End: 2017-03-23

## 2017-03-23 VITALS — HEART RATE: 85 BPM

## 2017-03-23 VITALS — DIASTOLIC BLOOD PRESSURE: 60 MMHG | SYSTOLIC BLOOD PRESSURE: 114 MMHG

## 2017-03-23 VITALS — WEIGHT: 154 LBS | BODY MASS INDEX: 30.08 KG/M2

## 2017-03-23 DIAGNOSIS — Z01.818 ENCOUNTER FOR OTHER PREPROCEDURAL EXAMINATION: ICD-10-CM

## 2017-03-23 DIAGNOSIS — Z87.898 PERSONAL HISTORY OF OTHER SPECIFIED CONDITIONS: ICD-10-CM

## 2017-03-23 RX ORDER — AZITHROMYCIN 250 MG/1
250 TABLET, FILM COATED ORAL
Qty: 1 | Refills: 0 | Status: DISCONTINUED | COMMUNITY
Start: 2017-03-07 | End: 2017-03-23

## 2017-03-23 RX ORDER — CYANOCOBALAMIN (VITAMIN B-12) 1000MCG/ML
VIAL (ML) INJECTION
Refills: 0 | Status: ACTIVE | COMMUNITY

## 2017-03-31 ENCOUNTER — OUTPATIENT (OUTPATIENT)
Dept: OUTPATIENT SERVICES | Facility: HOSPITAL | Age: 72
LOS: 1 days | End: 2017-03-31
Payer: MEDICARE

## 2017-03-31 VITALS
DIASTOLIC BLOOD PRESSURE: 68 MMHG | HEIGHT: 60 IN | OXYGEN SATURATION: 97 % | HEART RATE: 82 BPM | SYSTOLIC BLOOD PRESSURE: 97 MMHG | WEIGHT: 154.1 LBS | RESPIRATION RATE: 18 BRPM | TEMPERATURE: 99 F

## 2017-03-31 DIAGNOSIS — Z95.810 PRESENCE OF AUTOMATIC (IMPLANTABLE) CARDIAC DEFIBRILLATOR: Chronic | ICD-10-CM

## 2017-03-31 DIAGNOSIS — Q20.8 OTHER CONGENITAL MALFORMATIONS OF CARDIAC CHAMBERS AND CONNECTIONS: Chronic | ICD-10-CM

## 2017-03-31 DIAGNOSIS — Z01.810 ENCOUNTER FOR PREPROCEDURAL CARDIOVASCULAR EXAMINATION: ICD-10-CM

## 2017-03-31 DIAGNOSIS — Z98.89 OTHER SPECIFIED POSTPROCEDURAL STATES: Chronic | ICD-10-CM

## 2017-03-31 LAB
ANION GAP SERPL CALC-SCNC: 14 MMOL/L — SIGNIFICANT CHANGE UP (ref 5–17)
ANISOCYTOSIS BLD QL: SLIGHT — SIGNIFICANT CHANGE UP
APTT BLD: 29 SEC — SIGNIFICANT CHANGE UP (ref 27.5–37.4)
BASOPHILS # BLD AUTO: 0 K/UL — SIGNIFICANT CHANGE UP (ref 0–0.2)
BASOPHILS NFR BLD AUTO: 1 % — SIGNIFICANT CHANGE UP (ref 0–2)
BUN SERPL-MCNC: 27 MG/DL — HIGH (ref 8–20)
CALCIUM SERPL-MCNC: 8.4 MG/DL — LOW (ref 8.6–10.2)
CHLORIDE SERPL-SCNC: 101 MMOL/L — SIGNIFICANT CHANGE UP (ref 98–107)
CHOLEST SERPL-MCNC: 109 MG/DL — LOW (ref 110–199)
CO2 SERPL-SCNC: 25 MMOL/L — SIGNIFICANT CHANGE UP (ref 22–29)
CREAT SERPL-MCNC: 1.23 MG/DL — SIGNIFICANT CHANGE UP (ref 0.5–1.3)
ELLIPTOCYTES BLD QL SMEAR: SLIGHT — SIGNIFICANT CHANGE UP
EOSINOPHIL # BLD AUTO: 0.1 K/UL — SIGNIFICANT CHANGE UP (ref 0–0.5)
EOSINOPHIL NFR BLD AUTO: 2 % — SIGNIFICANT CHANGE UP (ref 0–5)
GLUCOSE SERPL-MCNC: 86 MG/DL — SIGNIFICANT CHANGE UP (ref 70–115)
HCT VFR BLD CALC: 30.2 % — LOW (ref 37–47)
HDLC SERPL-MCNC: 57 MG/DL — LOW
HGB BLD-MCNC: 9.4 G/DL — LOW (ref 12–16)
HYPOCHROMIA BLD QL: SLIGHT — SIGNIFICANT CHANGE UP
INR BLD: 1.04 RATIO — SIGNIFICANT CHANGE UP (ref 0.88–1.16)
LIPID PNL WITH DIRECT LDL SERPL: 40 MG/DL — SIGNIFICANT CHANGE UP
LYMPHOCYTES # BLD AUTO: 1.6 K/UL — SIGNIFICANT CHANGE UP (ref 1–4.8)
LYMPHOCYTES # BLD AUTO: 28 % — SIGNIFICANT CHANGE UP (ref 20–55)
MAGNESIUM SERPL-MCNC: 2.6 MG/DL — HIGH (ref 1.8–2.5)
MCHC RBC-ENTMCNC: 24.9 PG — LOW (ref 27–31)
MCHC RBC-ENTMCNC: 31.1 G/DL — LOW (ref 32–36)
MCV RBC AUTO: 79.9 FL — LOW (ref 81–99)
MICROCYTES BLD QL: SLIGHT — SIGNIFICANT CHANGE UP
MONOCYTES # BLD AUTO: 0.9 K/UL — HIGH (ref 0–0.8)
MONOCYTES NFR BLD AUTO: 16 % — HIGH (ref 3–10)
NEUTROPHILS # BLD AUTO: 3.2 K/UL — SIGNIFICANT CHANGE UP (ref 1.8–8)
NEUTROPHILS NFR BLD AUTO: 53 % — SIGNIFICANT CHANGE UP (ref 37–73)
OVALOCYTES BLD QL SMEAR: SLIGHT — SIGNIFICANT CHANGE UP
PLAT MORPH BLD: NORMAL — SIGNIFICANT CHANGE UP
PLATELET # BLD AUTO: 291 K/UL — SIGNIFICANT CHANGE UP (ref 150–400)
POIKILOCYTOSIS BLD QL AUTO: SLIGHT — SIGNIFICANT CHANGE UP
POLYCHROMASIA BLD QL SMEAR: SLIGHT — SIGNIFICANT CHANGE UP
POTASSIUM SERPL-MCNC: 4 MMOL/L — SIGNIFICANT CHANGE UP (ref 3.5–5.3)
POTASSIUM SERPL-SCNC: 4 MMOL/L — SIGNIFICANT CHANGE UP (ref 3.5–5.3)
PROTHROM AB SERPL-ACNC: 11.4 SEC — SIGNIFICANT CHANGE UP (ref 9.8–12.7)
RBC # BLD: 3.78 M/UL — LOW (ref 4.4–5.2)
RBC # FLD: 23.6 % — HIGH (ref 11–15.6)
RBC BLD AUTO: ABNORMAL
SCHISTOCYTES BLD QL AUTO: SLIGHT — SIGNIFICANT CHANGE UP
SODIUM SERPL-SCNC: 140 MMOL/L — SIGNIFICANT CHANGE UP (ref 135–145)
SPHEROCYTES BLD QL SMEAR: SLIGHT — SIGNIFICANT CHANGE UP
TOTAL CHOLESTEROL/HDL RATIO MEASUREMENT: 2 RATIO — LOW (ref 3.3–7.1)
TRIGL SERPL-MCNC: 61 MG/DL — SIGNIFICANT CHANGE UP (ref 10–200)
WBC # BLD: 5.9 K/UL — SIGNIFICANT CHANGE UP (ref 4.8–10.8)
WBC # FLD AUTO: 5.9 K/UL — SIGNIFICANT CHANGE UP (ref 4.8–10.8)

## 2017-03-31 PROCEDURE — 80061 LIPID PANEL: CPT

## 2017-03-31 PROCEDURE — 83735 ASSAY OF MAGNESIUM: CPT

## 2017-03-31 PROCEDURE — 85610 PROTHROMBIN TIME: CPT

## 2017-03-31 PROCEDURE — 93005 ELECTROCARDIOGRAM TRACING: CPT

## 2017-03-31 PROCEDURE — 93010 ELECTROCARDIOGRAM REPORT: CPT

## 2017-03-31 PROCEDURE — 85730 THROMBOPLASTIN TIME PARTIAL: CPT

## 2017-03-31 PROCEDURE — 85027 COMPLETE CBC AUTOMATED: CPT

## 2017-03-31 PROCEDURE — G0463: CPT

## 2017-03-31 PROCEDURE — 80048 BASIC METABOLIC PNL TOTAL CA: CPT

## 2017-03-31 RX ORDER — PREGABALIN 225 MG/1
1 CAPSULE ORAL
Qty: 0 | Refills: 0 | COMMUNITY

## 2017-03-31 NOTE — ASU PATIENT PROFILE, ADULT - PSH
Abnormality of left atrial appendage  s/p Lariat 2012  AICD (automatic cardioverter/defibrillator) present  [employer] (2006)  Bariatric surgery status  gastric bypass 2001  Hernia  x2  History of breast surgery  "Lift"  Spinal cord stimulator status  [employer] (2014) recent battery change

## 2017-03-31 NOTE — H&P PST ADULT - PSH
Abnormality of left atrial appendage  s/p Lariat 2012  AICD (automatic cardioverter/defibrillator) present  [employer] (2006)  Bariatric surgery status  gastric bypass 2001  Hernia  x2  History of breast surgery  "Lift"  Spinal cord stimulator status  [employer] (2014) recent battery change

## 2017-03-31 NOTE — H&P PST ADULT - PMH
Afib    Anemia    Arthritis    B12 deficiency anemia    Cardiomyopathy    CHF (congestive heart failure)  last episode  jan 2017   at  [facility], treated with diuretcss  Chronic back pain    COPD (chronic obstructive pulmonary disease)    Ejection fraction < 50%    GI bleed not requiring more than 4 units of blood in 24 hours, ICU, or surgery    HLD (hyperlipidemia)    HTN (hypertension)    Hypothyroid    MI (myocardial infarction)  3 times (2000,2002)  MR (mitral regurgitation)  moderate to severe  Raynaud's disease    Scleroderma    Sleep apnea  uses oral plate  Ventricular tachycardia

## 2017-03-31 NOTE — H&P PST ADULT - HISTORY OF PRESENT ILLNESS
71 year old female who presents for PST prior to MAGNOLIA. Pmhx of afib, NICM and significant LV dysfunction with normalization of EF with medical therapy with mild MR documented on outpatient TTE. Obesity, s/p gastric bypass, dual chamber PPM. Patient with recent admission for CHF vs worsening MR with the plan to perform a MAGNOLIA to assess that. Patient with chronic afib s/p Lariat. Patient with anemia of chronic disease and h/o of recurrent GIB not a candidate for AC.

## 2017-03-31 NOTE — ASU PATIENT PROFILE, ADULT - PMH
Afib    Anemia    Arthritis    B12 deficiency anemia    Cardiomyopathy    CHF (congestive heart failure)  last episode  jan 2017   at  [facility], treated with diuretcss  Chronic back pain    COPD (chronic obstructive pulmonary disease)    Ejection fraction < 50%    GI bleed not requiring more than 4 units of blood in 24 hours, ICU, or surgery    HLD (hyperlipidemia)    HTN (hypertension)    Hypothyroid    MI (myocardial infarction)  3 times (2000,2002)  MR (mitral regurgitation)  moderate to severe  Raynaud's disease    Scleroderma    Sleep apnea  uses oral plate  Ventricular tachycardia

## 2017-04-04 ENCOUNTER — OUTPATIENT (OUTPATIENT)
Dept: OUTPATIENT SERVICES | Facility: HOSPITAL | Age: 72
LOS: 1 days | End: 2017-04-04
Payer: MEDICARE

## 2017-04-04 ENCOUNTER — TRANSCRIPTION ENCOUNTER (OUTPATIENT)
Age: 72
End: 2017-04-04

## 2017-04-04 DIAGNOSIS — Z98.89 OTHER SPECIFIED POSTPROCEDURAL STATES: Chronic | ICD-10-CM

## 2017-04-04 DIAGNOSIS — Z95.810 PRESENCE OF AUTOMATIC (IMPLANTABLE) CARDIAC DEFIBRILLATOR: Chronic | ICD-10-CM

## 2017-04-04 DIAGNOSIS — Q20.8 OTHER CONGENITAL MALFORMATIONS OF CARDIAC CHAMBERS AND CONNECTIONS: Chronic | ICD-10-CM

## 2017-04-04 DIAGNOSIS — I25.10 ATHEROSCLEROTIC HEART DISEASE OF NATIVE CORONARY ARTERY WITHOUT ANGINA PECTORIS: ICD-10-CM

## 2017-04-04 DIAGNOSIS — I34.0 NONRHEUMATIC MITRAL (VALVE) INSUFFICIENCY: ICD-10-CM

## 2017-04-04 DIAGNOSIS — Z01.810 ENCOUNTER FOR PREPROCEDURAL CARDIOVASCULAR EXAMINATION: ICD-10-CM

## 2017-04-04 PROCEDURE — 93312 ECHO TRANSESOPHAGEAL: CPT

## 2017-04-04 PROCEDURE — 93320 DOPPLER ECHO COMPLETE: CPT

## 2017-04-04 PROCEDURE — 93325 DOPPLER ECHO COLOR FLOW MAPG: CPT

## 2017-04-04 NOTE — PROGRESS NOTE ADULT - SUBJECTIVE AND OBJECTIVE BOX
[place] [facility], THE HEART C[facility]                                   [address] [place] [address]                                                      PHONE: [phone]                                                         FAX: [phone]  [email or web address]  ---------------------------------------------------------------------------------------------------------------------------------    Overnight events/patient complaints: Here for MAGNOLIA      Keflex (Unknown)    MEDICATIONS  (STANDING):    MEDICATIONS  (PRN):      Vital Signs Last 24 Hrs  T(C): --  T(F): --  HR: --  BP: --  BP(mean): --  RR: --  SpO2: --  Daily     Daily   ICU Vital Signs Last 24 Hrs  MARGARITO FRIEND  I&O's Detail    I&O's Summary    Drug Dosing Weight  MARGARITO PHUC      PHYSICAL EXAM:  General: Appears well developed, well nourished alert and cooperative.  HEENT: Head; normocephalic, atraumatic.  Eyes: Pupils reactive, cornea wnl.  Neck: Supple, no nodes adenopathy, no NVD or carotid bruit or thyromegaly.  CARDIOVASCULAR: Normal S1 and S2, No murmur, rub, gallop or lift.   LUNGS: No rales, rhonchi or wheeze. Normal breath sounds bilaterally.  ABDOMEN: Soft, nontender without mass or organomegaly. bowel sounds normoactive.  EXTREMITIES: No clubbing, cyanosis or edema. Distal pulses wnl.   SKIN: warm and dry with normal turgor.  NEURO: Alert/oriented x 3/normal motor exam. No pathologic reflexes.    PSYCH: normal affect.        LABS:          MARGARITO FRIEND            ECHO: MAGNOLIA LV global hypo EF 40 %, severe MR due to annular dilatation and poor coaptation, normal AV, severe TR, RV function normal, PFO

## 2017-04-04 NOTE — PROGRESS NOTE ADULT - ASSESSMENT
Assessment  Severe MR  DCM EF 40%  Severe TR  s/p lariat    Rec:   R and L cath and prob MV repair/TV annuloplasty

## 2017-04-04 NOTE — DISCHARGE NOTE ADULT - PATIENT PORTAL LINK FT
“You can access the [facility] Patient Portal, offered by [facility], by registering with the following website: [email or web address]”

## 2017-04-04 NOTE — DISCHARGE NOTE ADULT - CARE PLAN
Principal Discharge DX:	MR (mitral regurgitation)  Goal:	Optimal cardiac function  Instructions for follow-up, activity and diet:	Notify your doctor if you develop a fever, cough up blood, have any chest pain, palpitations or difficulty breathing. You may take a throat lozenge if you develop a sore throat or try warm salt water gargle. Resume your diet with soft foods first. Follow up with your cardiologist within 2 weeks for a follow up or sooner with any concerns. Report to the nearest ER with any emergencies.

## 2017-04-04 NOTE — DISCHARGE NOTE ADULT - HOSPITAL COURSE
s/p MAGNOLIA     Summary:   1. Left ventricular ejection fraction, by visual estimation, is 40 to   45%.   2. Elevated mean left atrial pressure.   3. The left ventricular diastolic function could not be assessed in this   study.   4. S/p Lariat without visible appendage or thrombus.   5. Severely dilated right atrium.   6. Severe mitral valve regurgitation.   7. Thickening of the anterior and posterior mitral valve leaflets.   8. Severe MR by color flow mapping, PISA ERO 0.36 cm2 and vena contracta   0.7 cm.   9. Severetricuspid regurgitation.  10. Small patent foramen ovale, with bidirectional shunting across atrial   septum.  11. Color Doppler demonstrates the presence of a shunt at the Atrial   level.  12. Narrow tunneled PFO with bidirectional shunting on colorflow,   confirmed by contrast and 3D imaging.  13. Severely enlarged left atrium.    Continue outpatient medications  Discharged home with outpatient follow up with Dr Drummond

## 2017-04-04 NOTE — DISCHARGE NOTE ADULT - CARE PROVIDER_API CALL
Hope Banks), Cardiovascular Disease; Internal Medicine  [address] [place] [id number]  Phone: [phone]  Fax: [phone]

## 2017-04-04 NOTE — DISCHARGE NOTE ADULT - MEDICATION SUMMARY - MEDICATIONS TO TAKE
I will START or STAY ON the medications listed below when I get home from the hospital:    prolea  -- 20 milligram(s) intramuscular every 6 months  -- Last given in February 2016  -- Indication: For osteoporosis    DULoxetine 60 mg oral delayed release capsule  -- 1 cap(s) by mouth once a day  -- Indication: For Mood disorder    Lipitor 10 mg oral tablet  -- 1 tab(s) by mouth once a day (at bedtime)  -- Indication: For HLD    Coreg 6.25 mg oral tablet  -- 1 tab(s) by mouth 2 times a day  -- Indication: For HTN    tiotropium 18 mcg inhalation capsule  -- 1 cap(s) inhaled once a day  -- Indication: For asthma    Norvasc 5 mg oral tablet  -- 1 tab(s) by mouth once a day  -- Indication: For HTN    Lasix  -- 80 milligram(s) by mouth once a day  -- Indication: For Heart disease    Colace 100 mg oral capsule  -- 2 cap(s) by mouth once a day  -- Indication: For Stool softener    Protonix 40 mg oral delayed release tablet  -- 1 tab(s) by mouth once a day  -- Indication: For Reflux    Synthroid 100 mcg (0.1 mg) oral tablet  -- 1 tab(s) by mouth once a day  -- Indication: For Thyroid    Vitamin D3 2000 intl units oral capsule  -- 1 cap(s) by mouth once a day  -- Indication: For Supplement    Vitamin B-12 1000 mcg/mL injectable solution  -- 1 milliliter(s) injectable every other week  -- Indication: For Supplement

## 2017-04-04 NOTE — DISCHARGE NOTE ADULT - PLAN OF CARE
Optimal cardiac function Notify your doctor if you develop a fever, cough up blood, have any chest pain, palpitations or difficulty breathing. You may take a throat lozenge if you develop a sore throat or try warm salt water gargle. Resume your diet with soft foods first. Follow up with your cardiologist within 2 weeks for a follow up or sooner with any concerns. Report to the nearest ER with any emergencies.

## 2017-04-05 DIAGNOSIS — I34.0 NONRHEUMATIC MITRAL (VALVE) INSUFFICIENCY: ICD-10-CM

## 2017-04-14 ENCOUNTER — TRANSCRIPTION ENCOUNTER (OUTPATIENT)
Age: 72
End: 2017-04-14

## 2017-04-14 ENCOUNTER — OUTPATIENT (OUTPATIENT)
Dept: OUTPATIENT SERVICES | Facility: HOSPITAL | Age: 72
LOS: 1 days | Discharge: HOME | End: 2017-04-14
Payer: MEDICARE

## 2017-04-14 VITALS
TEMPERATURE: 98 F | DIASTOLIC BLOOD PRESSURE: 66 MMHG | SYSTOLIC BLOOD PRESSURE: 113 MMHG | RESPIRATION RATE: 18 BRPM | HEART RATE: 78 BPM

## 2017-04-14 VITALS — SYSTOLIC BLOOD PRESSURE: 119 MMHG | HEART RATE: 62 BPM | RESPIRATION RATE: 16 BRPM | DIASTOLIC BLOOD PRESSURE: 58 MMHG

## 2017-04-14 DIAGNOSIS — Z95.810 PRESENCE OF AUTOMATIC (IMPLANTABLE) CARDIAC DEFIBRILLATOR: Chronic | ICD-10-CM

## 2017-04-14 DIAGNOSIS — Z98.89 OTHER SPECIFIED POSTPROCEDURAL STATES: Chronic | ICD-10-CM

## 2017-04-14 DIAGNOSIS — Q20.8 OTHER CONGENITAL MALFORMATIONS OF CARDIAC CHAMBERS AND CONNECTIONS: Chronic | ICD-10-CM

## 2017-04-14 DIAGNOSIS — I34.8 OTHER NONRHEUMATIC MITRAL VALVE DISORDERS: ICD-10-CM

## 2017-04-14 LAB
ANION GAP SERPL CALC-SCNC: 16 MMOL/L — SIGNIFICANT CHANGE UP (ref 5–17)
BUN SERPL-MCNC: 24 MG/DL — HIGH (ref 8–20)
CALCIUM SERPL-MCNC: 9 MG/DL — SIGNIFICANT CHANGE UP (ref 8.6–10.2)
CHLORIDE SERPL-SCNC: 98 MMOL/L — SIGNIFICANT CHANGE UP (ref 98–107)
CO2 SERPL-SCNC: 26 MMOL/L — SIGNIFICANT CHANGE UP (ref 22–29)
CREAT SERPL-MCNC: 1.31 MG/DL — HIGH (ref 0.5–1.3)
GLUCOSE SERPL-MCNC: 84 MG/DL — SIGNIFICANT CHANGE UP (ref 70–115)
HCT VFR BLD CALC: 32 % — LOW (ref 37–47)
HGB BLD-MCNC: 10 G/DL — LOW (ref 12–16)
MAGNESIUM SERPL-MCNC: 2.5 MG/DL — SIGNIFICANT CHANGE UP (ref 1.8–2.5)
MCHC RBC-ENTMCNC: 25.3 PG — LOW (ref 27–31)
MCHC RBC-ENTMCNC: 31.3 G/DL — LOW (ref 32–36)
MCV RBC AUTO: 80.8 FL — LOW (ref 81–99)
PLATELET # BLD AUTO: 343 K/UL — SIGNIFICANT CHANGE UP (ref 150–400)
POTASSIUM SERPL-MCNC: 3.9 MMOL/L — SIGNIFICANT CHANGE UP (ref 3.5–5.3)
POTASSIUM SERPL-SCNC: 3.9 MMOL/L — SIGNIFICANT CHANGE UP (ref 3.5–5.3)
RBC # BLD: 3.96 M/UL — LOW (ref 4.4–5.2)
RBC # FLD: 23.3 % — HIGH (ref 11–15.6)
SODIUM SERPL-SCNC: 140 MMOL/L — SIGNIFICANT CHANGE UP (ref 135–145)
WBC # BLD: 5.4 K/UL — SIGNIFICANT CHANGE UP (ref 4.8–10.8)
WBC # FLD AUTO: 5.4 K/UL — SIGNIFICANT CHANGE UP (ref 4.8–10.8)

## 2017-04-14 PROCEDURE — 93005 ELECTROCARDIOGRAM TRACING: CPT

## 2017-04-14 PROCEDURE — C1769: CPT

## 2017-04-14 PROCEDURE — C1760: CPT

## 2017-04-14 PROCEDURE — 93010 ELECTROCARDIOGRAM REPORT: CPT

## 2017-04-14 PROCEDURE — C1889: CPT

## 2017-04-14 PROCEDURE — 93460 R&L HRT ART/VENTRICLE ANGIO: CPT

## 2017-04-14 PROCEDURE — C1887: CPT

## 2017-04-14 PROCEDURE — 83735 ASSAY OF MAGNESIUM: CPT

## 2017-04-14 PROCEDURE — C1894: CPT

## 2017-04-14 PROCEDURE — 80048 BASIC METABOLIC PNL TOTAL CA: CPT

## 2017-04-14 PROCEDURE — 85027 COMPLETE CBC AUTOMATED: CPT

## 2017-04-14 RX ORDER — SODIUM CHLORIDE 9 MG/ML
5004 INJECTION INTRAMUSCULAR; INTRAVENOUS; SUBCUTANEOUS
Qty: 0 | Refills: 0 | Status: DISCONTINUED | OUTPATIENT
Start: 2017-04-14 | End: 2017-04-29

## 2017-04-14 NOTE — H&P PST ADULT - ASSESSMENT
Severe MR - LHC today to evaluate coronaries CM, Severe MR and TR pending surgical eval - R+LHC today

## 2017-04-14 NOTE — DISCHARGE NOTE ADULT - CARE PROVIDER_API CALL
Hope Banks), Cardiovascular Disease; Internal Medicine  [address] [place] [id number]  Phone: [phone]  Fax: [phone]

## 2017-04-14 NOTE — DISCHARGE NOTE ADULT - MEDICATION SUMMARY - MEDICATIONS TO TAKE
I will START or STAY ON the medications listed below when I get home from the hospital:    prolea  -- 20 milligram(s) intramuscular every 6 months  -- Last given in February 2016  -- Indication: For Osteoarthritis    DULoxetine 60 mg oral delayed release capsule  -- 1 cap(s) by mouth once a day  -- Indication: For Depression    Lipitor 10 mg oral tablet  -- 1 tab(s) by mouth once a day (at bedtime)  -- Indication: For HLD    Coreg 6.25 mg oral tablet  -- 1 tab(s) by mouth 2 times a day  -- Indication: For HTN    tiotropium 18 mcg inhalation capsule  -- 1 cap(s) inhaled once a day  -- Indication: For Asthma    Norvasc 5 mg oral tablet  -- 1 tab(s) by mouth once a day  -- Indication: For HTN    Lasix  -- 80 milligram(s) by mouth once a day  -- Indication: For Cardiomyopathy    Colace 100 mg oral capsule  -- 2 cap(s) by mouth once a day  -- Indication: For Stool softener    Protonix 40 mg oral delayed release tablet  -- 1 tab(s) by mouth once a day  -- Indication: For Reflux    Synthroid 100 mcg (0.1 mg) oral tablet  -- 1 tab(s) by mouth once a day  -- Indication: For Hypothyroid    Vitamin D3 2000 intl units oral capsule  -- 1 cap(s) by mouth once a day  -- Indication: For Supplement    Vitamin B-12 1000 mcg/mL injectable solution  -- 1 milliliter(s) injectable every other week  -- Indication: For Supplement

## 2017-04-14 NOTE — H&P PST ADULT - HISTORY OF PRESENT ILLNESS
70 y/o female with hx afib, s/p Lariat 2012, recurrent GIB unable to tolerate A/C or aspirin, NICM, [employer] AICD (2006), anemia secondary to gastric band surgery 2001, recently admitted at [facility] receiving 2 units PRBC now on B12 injections. She has a hx MR, MAGNOLIA 2 weeks ago showed severe MR and TR. She has c/o progressive SUAREZ, LE edema, chest tightness which she believes may be anxiety-related. She is being considered for a MitraClip / MV repair with TV annuloplasty    MAGNOLIA 4.2017  Summary:   1. Left ventricular ejection fraction, by visual estimation, is 40 to   45%.   2. Elevated mean left atrial pressure.   3. The left ventricular diastolic function could not be assessed in this   study.   4. S/p Lariat without visible appendage or thrombus.   5. Severely dilated right atrium.   6. Severe mitral valve regurgitation.   7. Thickening of the anterior and posterior mitral valve leaflets.   8. Severe MR by color flow mapping, PISA ERO 0.36 cm2 and vena contracta   0.7 cm.   9. Severetricuspid regurgitation.  10. Small patent foramen ovale, with bidirectional shunting across atrial   septum.  11. Color Doppler demonstrates the presence of a shunt at the Atrial   level.  12. Narrow tunneled PFO with bidirectional shunting on colorflow,   confirmed by contrast and 3D imaging.  13. Severely enlarged left atrium.

## 2017-04-14 NOTE — DISCHARGE NOTE ADULT - CARE PLAN
Principal Discharge DX:	MR (mitral regurgitation)  Goal:	Optimal cardiac function  Instructions for follow-up, activity and diet:	No heavy lifting, driving, sex, tub baths, swimming, or any activity that submerges the lower half of the body in water for 48 hours.  Limited walking and stairs for 48 hours.    Change the bandaid after 24 hours and every 24 hours after that.  Keep the puncture site dry and covered with a bandaid until a scab forms.    Observe the site frequently.  If bleeding or a large lump (the size of a golf ball or bigger) occurs lie flat, apply continuous direct pressure just above the puncture site for at least 10 minutes, and notify your physician immediately.  If the bleeding cannot be controlled, call 911 immediately for assistance.  Notify your physician of pain, swelling or any drainage.    Notify your physician immediately if coldness, numbness, discoloration or pain in your foot occurs.

## 2017-04-14 NOTE — H&P PST ADULT - PSH
Abnormality of left atrial appendage  s/p Lariat 2012  AICD (automatic cardioverter/defibrillator) present  [employer] (2006)  Bariatric surgery status  gastric bypass 2001  Hernia  x2  History of breast surgery  "Lift"  Spinal cord stimulator status  [employer] (2014) recent battery change

## 2017-04-14 NOTE — DISCHARGE NOTE ADULT - PLAN OF CARE
Optimal cardiac function No heavy lifting, driving, sex, tub baths, swimming, or any activity that submerges the lower half of the body in water for 48 hours.  Limited walking and stairs for 48 hours.    Change the bandaid after 24 hours and every 24 hours after that.  Keep the puncture site dry and covered with a bandaid until a scab forms.    Observe the site frequently.  If bleeding or a large lump (the size of a golf ball or bigger) occurs lie flat, apply continuous direct pressure just above the puncture site for at least 10 minutes, and notify your physician immediately.  If the bleeding cannot be controlled, call 911 immediately for assistance.  Notify your physician of pain, swelling or any drainage.    Notify your physician immediately if coldness, numbness, discoloration or pain in your foot occurs.

## 2017-04-14 NOTE — H&P PST ADULT - PMH
Afib    Anemia    Arthritis    B12 deficiency anemia    Cardiomyopathy    CHF (congestive heart failure)  last episode  jan 2017   at  [facility], treated with diuretcss  Chronic back pain    COPD (chronic obstructive pulmonary disease)    Ejection fraction < 50%    GI bleed not requiring more than 4 units of blood in 24 hours, ICU, or surgery    HLD (hyperlipidemia)    HTN (hypertension)    Hypothyroid    MI (myocardial infarction)  3 times (2000,2002)  MR (mitral regurgitation)  moderate to severe  Raynaud's disease    Scleroderma    Sleep apnea  uses oral plate  Ventricular tachycardia

## 2017-04-14 NOTE — DISCHARGE NOTE ADULT - PATIENT PORTAL LINK FT
“You can access the [facility] Patient Portal, offered by [facility], by registering with the following website: [email or web address]”

## 2017-04-14 NOTE — DISCHARGE NOTE ADULT - ADDITIONAL INSTRUCTIONS
Follow up with your Cardiologist as instructed.  Take all medications as instructed.  Speak to your doctor before stopping any medications.  Follow the specified diet.

## 2017-04-14 NOTE — DISCHARGE NOTE ADULT - HOSPITAL COURSE
s/p R+LHC    Pressures:  -- Aortic Pressure (S/D/M): 116/81/88  Pressures:  -- Left Ventricle (s/edp):123/19/--  Pressures:  -- Pulmonary Artery (S/D/M): 52/23/39  Pressures:  -- Pulmonary Capillary Wedge: 31/31/28  Pressures:  -- Right Atrium (a/v/M): 16/24/15  Pressures:  -- Right Ventricle (s/edp): 39/14/--    Moderate pulmonary HTN.  Preserved EF w/ severe MR.  No significant CAD.    RFA mynx, RFV sheath pull by RN  No complications during hospital stay    ROS:  Resp: Denies dyspnea or SOB  CV: Denies chest pain, palpitations, SUAREZ  GI: No black/bloody stools  : No hematuria  Heme: No bleeding/bruising problems  Neuro: Denies dizziness    Physical Exam:  Gen: Awake, alert, in no acute distress  Right Groin: Soft, no bleeding, no hematoma  Ext: No edema, + distal pulses  Neuro: A&OX3    Plan:  Bedrest for 2 hours  Continue current medications  Outpatient follow up with Dr Banks

## 2017-04-17 ENCOUNTER — LABORATORY RESULT (OUTPATIENT)
Age: 72
End: 2017-04-17

## 2017-04-17 ENCOUNTER — APPOINTMENT (OUTPATIENT)
Dept: NEPHROLOGY | Facility: CLINIC | Age: 72
End: 2017-04-17

## 2017-04-17 VITALS
BODY MASS INDEX: 30.23 KG/M2 | HEIGHT: 60 IN | DIASTOLIC BLOOD PRESSURE: 64 MMHG | SYSTOLIC BLOOD PRESSURE: 114 MMHG | WEIGHT: 154 LBS

## 2017-04-17 DIAGNOSIS — D63.8 ANEMIA IN OTHER CHRONIC DISEASES CLASSIFIED ELSEWHERE: ICD-10-CM

## 2017-04-17 DIAGNOSIS — I45.10 UNSPECIFIED RIGHT BUNDLE-BRANCH BLOCK: ICD-10-CM

## 2017-04-19 LAB
25(OH)D3 SERPL-MCNC: 45.1 NG/ML
ALBUMIN SERPL ELPH-MCNC: 3.9 G/DL
ANION GAP SERPL CALC-SCNC: 21 MMOL/L
APPEARANCE: CLEAR
BACTERIA: NEGATIVE
BASOPHILS # BLD AUTO: 0.05 K/UL
BASOPHILS NFR BLD AUTO: 0.9 %
BILIRUBIN URINE: NEGATIVE
BLOOD URINE: NEGATIVE
BUN SERPL-MCNC: 25 MG/DL
CALCIUM SERPL-MCNC: 8.8 MG/DL
CALCIUM SERPL-MCNC: 8.8 MG/DL
CHLORIDE SERPL-SCNC: 100 MMOL/L
CO2 SERPL-SCNC: 21 MMOL/L
COLOR: YELLOW
CREAT SERPL-MCNC: 1.39 MG/DL
CREAT SPEC-SCNC: 29 MG/DL
CREAT/PROT UR: 0.2 RATIO
EOSINOPHIL # BLD AUTO: 0.1 K/UL
EOSINOPHIL NFR BLD AUTO: 1.9 %
GLUCOSE QUALITATIVE U: NORMAL MG/DL
GLUCOSE SERPL-MCNC: 139 MG/DL
HCT VFR BLD CALC: 31.4 %
HGB BLD-MCNC: 9.6 G/DL
HYALINE CASTS: 0 /LPF
KETONES URINE: NEGATIVE
LEUKOCYTE ESTERASE URINE: NEGATIVE
LYMPHOCYTES # BLD AUTO: 2.06 K/UL
LYMPHOCYTES NFR BLD AUTO: 40.7 %
MAN DIFF?: NORMAL
MCHC RBC-ENTMCNC: 24.9 PG
MCHC RBC-ENTMCNC: 30.6 GM/DL
MCV RBC AUTO: 81.6 FL
MICROSCOPIC-UA: NORMAL
MONOCYTES # BLD AUTO: 0.56 K/UL
MONOCYTES NFR BLD AUTO: 11.1 %
NEUTROPHILS # BLD AUTO: 2.2 K/UL
NEUTROPHILS NFR BLD AUTO: 43.5 %
NITRITE URINE: NEGATIVE
PARATHYROID HORMONE INTACT: 147 PG/ML
PH URINE: 6.5
PHOSPHATE SERPL-MCNC: 3.7 MG/DL
PLATELET # BLD AUTO: 304 K/UL
POTASSIUM SERPL-SCNC: 3.8 MMOL/L
PROT UR-MCNC: 5 MG/DL
PROTEIN URINE: NEGATIVE MG/DL
RBC # BLD: 3.85 M/UL
RBC # FLD: 23.8 %
RED BLOOD CELLS URINE: 2 /HPF
SODIUM SERPL-SCNC: 142 MMOL/L
SPECIFIC GRAVITY URINE: 1.01
SQUAMOUS EPITHELIAL CELLS: 0 /HPF
UROBILINOGEN URINE: 1 MG/DL
WBC # FLD AUTO: 5.05 K/UL
WHITE BLOOD CELLS URINE: 0 /HPF

## 2017-05-02 ENCOUNTER — APPOINTMENT (OUTPATIENT)
Dept: CARDIOTHORACIC SURGERY | Facility: CLINIC | Age: 72
End: 2017-05-02

## 2017-05-02 VITALS
DIASTOLIC BLOOD PRESSURE: 64 MMHG | HEIGHT: 60 IN | RESPIRATION RATE: 18 BRPM | WEIGHT: 154 LBS | SYSTOLIC BLOOD PRESSURE: 95 MMHG | OXYGEN SATURATION: 98 % | BODY MASS INDEX: 30.23 KG/M2 | HEART RATE: 60 BPM

## 2017-05-02 RX ORDER — CLINDAMYCIN HYDROCHLORIDE 300 MG/1
300 CAPSULE ORAL
Qty: 15 | Refills: 0 | Status: DISCONTINUED | COMMUNITY
Start: 2017-04-24

## 2017-05-22 ENCOUNTER — APPOINTMENT (OUTPATIENT)
Dept: FAMILY MEDICINE | Facility: CLINIC | Age: 72
End: 2017-05-22

## 2017-05-24 LAB
HBA1C MFR BLD HPLC: 5.7
LDLC SERPL CALC-MCNC: 49

## 2017-06-03 DIAGNOSIS — F41.9 ANXIETY DISORDER, UNSPECIFIED: ICD-10-CM

## 2017-06-05 ENCOUNTER — FORM ENCOUNTER (OUTPATIENT)
Age: 72
End: 2017-06-05

## 2017-06-06 ENCOUNTER — APPOINTMENT (OUTPATIENT)
Dept: CT IMAGING | Facility: CLINIC | Age: 72
End: 2017-06-06

## 2017-06-06 ENCOUNTER — OUTPATIENT (OUTPATIENT)
Dept: OUTPATIENT SERVICES | Facility: HOSPITAL | Age: 72
LOS: 1 days | End: 2017-06-06
Payer: MEDICARE

## 2017-06-06 DIAGNOSIS — Z98.89 OTHER SPECIFIED POSTPROCEDURAL STATES: Chronic | ICD-10-CM

## 2017-06-06 DIAGNOSIS — Z00.8 ENCOUNTER FOR OTHER GENERAL EXAMINATION: ICD-10-CM

## 2017-06-06 DIAGNOSIS — Q20.8 OTHER CONGENITAL MALFORMATIONS OF CARDIAC CHAMBERS AND CONNECTIONS: Chronic | ICD-10-CM

## 2017-06-06 DIAGNOSIS — Z95.810 PRESENCE OF AUTOMATIC (IMPLANTABLE) CARDIAC DEFIBRILLATOR: Chronic | ICD-10-CM

## 2017-06-06 PROBLEM — F41.9 ANXIETY: Status: ACTIVE | Noted: 2017-06-06

## 2017-06-06 PROCEDURE — 71250 CT THORAX DX C-: CPT

## 2017-06-16 ENCOUNTER — OUTPATIENT (OUTPATIENT)
Dept: OUTPATIENT SERVICES | Facility: HOSPITAL | Age: 72
LOS: 1 days | End: 2017-06-16
Payer: MEDICARE

## 2017-06-16 VITALS
HEART RATE: 79 BPM | SYSTOLIC BLOOD PRESSURE: 110 MMHG | WEIGHT: 149.91 LBS | HEIGHT: 61 IN | TEMPERATURE: 98 F | RESPIRATION RATE: 16 BRPM | DIASTOLIC BLOOD PRESSURE: 78 MMHG

## 2017-06-16 DIAGNOSIS — Z98.89 OTHER SPECIFIED POSTPROCEDURAL STATES: Chronic | ICD-10-CM

## 2017-06-16 DIAGNOSIS — I10 ESSENTIAL (PRIMARY) HYPERTENSION: ICD-10-CM

## 2017-06-16 DIAGNOSIS — Q20.8 OTHER CONGENITAL MALFORMATIONS OF CARDIAC CHAMBERS AND CONNECTIONS: Chronic | ICD-10-CM

## 2017-06-16 DIAGNOSIS — Z95.810 PRESENCE OF AUTOMATIC (IMPLANTABLE) CARDIAC DEFIBRILLATOR: Chronic | ICD-10-CM

## 2017-06-16 DIAGNOSIS — I34.0 NONRHEUMATIC MITRAL (VALVE) INSUFFICIENCY: ICD-10-CM

## 2017-06-16 DIAGNOSIS — I48.91 UNSPECIFIED ATRIAL FIBRILLATION: ICD-10-CM

## 2017-06-16 DIAGNOSIS — Z01.818 ENCOUNTER FOR OTHER PREPROCEDURAL EXAMINATION: ICD-10-CM

## 2017-06-16 LAB
ALBUMIN SERPL ELPH-MCNC: 4.1 G/DL — SIGNIFICANT CHANGE UP (ref 3.3–5.2)
ALP SERPL-CCNC: 60 U/L — SIGNIFICANT CHANGE UP (ref 40–120)
ALT FLD-CCNC: 19 U/L — SIGNIFICANT CHANGE UP
ANION GAP SERPL CALC-SCNC: 15 MMOL/L — SIGNIFICANT CHANGE UP (ref 5–17)
APPEARANCE UR: CLEAR — SIGNIFICANT CHANGE UP
APTT BLD: 30.8 SEC — SIGNIFICANT CHANGE UP (ref 27.5–37.4)
AST SERPL-CCNC: 27 U/L — SIGNIFICANT CHANGE UP
BASOPHILS # BLD AUTO: 0 K/UL — SIGNIFICANT CHANGE UP (ref 0–0.2)
BASOPHILS NFR BLD AUTO: 0.6 % — SIGNIFICANT CHANGE UP (ref 0–2)
BILIRUB SERPL-MCNC: 0.6 MG/DL — SIGNIFICANT CHANGE UP (ref 0.4–2)
BILIRUB UR-MCNC: NEGATIVE — SIGNIFICANT CHANGE UP
BLD GP AB SCN SERPL QL: SIGNIFICANT CHANGE UP
BUN SERPL-MCNC: 24 MG/DL — HIGH (ref 8–20)
CALCIUM SERPL-MCNC: 8.9 MG/DL — SIGNIFICANT CHANGE UP (ref 8.6–10.2)
CHLORIDE SERPL-SCNC: 99 MMOL/L — SIGNIFICANT CHANGE UP (ref 98–107)
CO2 SERPL-SCNC: 26 MMOL/L — SIGNIFICANT CHANGE UP (ref 22–29)
COLOR SPEC: SIGNIFICANT CHANGE UP
COMMENT - BLOOD BANK: SIGNIFICANT CHANGE UP
CREAT SERPL-MCNC: 1.17 MG/DL — SIGNIFICANT CHANGE UP (ref 0.5–1.3)
DIFF PNL FLD: NEGATIVE — SIGNIFICANT CHANGE UP
EOSINOPHIL # BLD AUTO: 0.2 K/UL — SIGNIFICANT CHANGE UP (ref 0–0.5)
EOSINOPHIL NFR BLD AUTO: 4.3 % — SIGNIFICANT CHANGE UP (ref 0–6)
GLUCOSE SERPL-MCNC: 90 MG/DL — SIGNIFICANT CHANGE UP (ref 70–115)
GLUCOSE UR QL: NEGATIVE MG/DL — SIGNIFICANT CHANGE UP
HBA1C BLD-MCNC: 5.5 % — SIGNIFICANT CHANGE UP (ref 4–5.6)
HCT VFR BLD CALC: 31.9 % — LOW (ref 37–47)
HGB BLD-MCNC: 9.9 G/DL — LOW (ref 12–16)
INR BLD: 1.05 RATIO — SIGNIFICANT CHANGE UP (ref 0.88–1.16)
KETONES UR-MCNC: NEGATIVE — SIGNIFICANT CHANGE UP
LEUKOCYTE ESTERASE UR-ACNC: NEGATIVE — SIGNIFICANT CHANGE UP
LYMPHOCYTES # BLD AUTO: 1.6 K/UL — SIGNIFICANT CHANGE UP (ref 1–4.8)
LYMPHOCYTES # BLD AUTO: 30.3 % — SIGNIFICANT CHANGE UP (ref 20–55)
MCHC RBC-ENTMCNC: 25.1 PG — LOW (ref 27–31)
MCHC RBC-ENTMCNC: 31 G/DL — LOW (ref 32–36)
MCV RBC AUTO: 81 FL — SIGNIFICANT CHANGE UP (ref 81–99)
MONOCYTES # BLD AUTO: 0.9 K/UL — HIGH (ref 0–0.8)
MONOCYTES NFR BLD AUTO: 17.2 % — HIGH (ref 3–10)
MRSA PCR RESULT.: SIGNIFICANT CHANGE UP
NEUTROPHILS # BLD AUTO: 2.4 K/UL — SIGNIFICANT CHANGE UP (ref 1.8–8)
NEUTROPHILS NFR BLD AUTO: 47.2 % — SIGNIFICANT CHANGE UP (ref 37–73)
NITRITE UR-MCNC: NEGATIVE — SIGNIFICANT CHANGE UP
NT-PROBNP SERPL-SCNC: 2394 PG/ML — HIGH (ref 0–300)
PH UR: 5 — SIGNIFICANT CHANGE UP (ref 5–8)
PLATELET # BLD AUTO: 305 K/UL — SIGNIFICANT CHANGE UP (ref 150–400)
POTASSIUM SERPL-MCNC: 4.6 MMOL/L — SIGNIFICANT CHANGE UP (ref 3.5–5.3)
POTASSIUM SERPL-SCNC: 4.6 MMOL/L — SIGNIFICANT CHANGE UP (ref 3.5–5.3)
PREALB SERPL-MCNC: 19 MG/DL — SIGNIFICANT CHANGE UP (ref 18–38)
PROT SERPL-MCNC: 7.5 G/DL — SIGNIFICANT CHANGE UP (ref 6.6–8.7)
PROT UR-MCNC: NEGATIVE MG/DL — SIGNIFICANT CHANGE UP
PROTHROM AB SERPL-ACNC: 11.6 SEC — SIGNIFICANT CHANGE UP (ref 9.8–12.7)
RBC # BLD: 3.94 M/UL — LOW (ref 4.4–5.2)
RBC # FLD: 19.8 % — HIGH (ref 11–15.6)
S AUREUS DNA NOSE QL NAA+PROBE: SIGNIFICANT CHANGE UP
SODIUM SERPL-SCNC: 140 MMOL/L — SIGNIFICANT CHANGE UP (ref 135–145)
SP GR SPEC: 1.01 — SIGNIFICANT CHANGE UP (ref 1.01–1.02)
T3 SERPL-MCNC: 64 NG/DL — LOW (ref 80–200)
T4 AB SER-ACNC: 6.1 UG/DL — SIGNIFICANT CHANGE UP (ref 4.5–12)
TSH SERPL-MCNC: 5.37 UIU/ML — HIGH (ref 0.27–4.2)
TYPE + AB SCN PNL BLD: SIGNIFICANT CHANGE UP
UROBILINOGEN FLD QL: NEGATIVE MG/DL — SIGNIFICANT CHANGE UP
WBC # BLD: 5.1 K/UL — SIGNIFICANT CHANGE UP (ref 4.8–10.8)
WBC # FLD AUTO: 5.1 K/UL — SIGNIFICANT CHANGE UP (ref 4.8–10.8)

## 2017-06-16 PROCEDURE — 84436 ASSAY OF TOTAL THYROXINE: CPT

## 2017-06-16 PROCEDURE — 84443 ASSAY THYROID STIM HORMONE: CPT

## 2017-06-16 PROCEDURE — 87086 URINE CULTURE/COLONY COUNT: CPT

## 2017-06-16 PROCEDURE — 84134 ASSAY OF PREALBUMIN: CPT

## 2017-06-16 PROCEDURE — 87640 STAPH A DNA AMP PROBE: CPT

## 2017-06-16 PROCEDURE — 86850 RBC ANTIBODY SCREEN: CPT

## 2017-06-16 PROCEDURE — 86901 BLOOD TYPING SEROLOGIC RH(D): CPT

## 2017-06-16 PROCEDURE — 84480 ASSAY TRIIODOTHYRONINE (T3): CPT

## 2017-06-16 PROCEDURE — 80053 COMPREHEN METABOLIC PANEL: CPT

## 2017-06-16 PROCEDURE — 85730 THROMBOPLASTIN TIME PARTIAL: CPT

## 2017-06-16 PROCEDURE — 86900 BLOOD TYPING SEROLOGIC ABO: CPT

## 2017-06-16 PROCEDURE — 93010 ELECTROCARDIOGRAM REPORT: CPT

## 2017-06-16 PROCEDURE — 81003 URINALYSIS AUTO W/O SCOPE: CPT

## 2017-06-16 PROCEDURE — 85027 COMPLETE CBC AUTOMATED: CPT

## 2017-06-16 PROCEDURE — 83880 ASSAY OF NATRIURETIC PEPTIDE: CPT

## 2017-06-16 PROCEDURE — 93880 EXTRACRANIAL BILAT STUDY: CPT | Mod: 26

## 2017-06-16 PROCEDURE — 93880 EXTRACRANIAL BILAT STUDY: CPT

## 2017-06-16 PROCEDURE — G0463: CPT

## 2017-06-16 PROCEDURE — 93005 ELECTROCARDIOGRAM TRACING: CPT

## 2017-06-16 PROCEDURE — 87641 MR-STAPH DNA AMP PROBE: CPT

## 2017-06-16 PROCEDURE — 83036 HEMOGLOBIN GLYCOSYLATED A1C: CPT

## 2017-06-16 PROCEDURE — 85610 PROTHROMBIN TIME: CPT

## 2017-06-16 RX ORDER — SODIUM CHLORIDE 9 MG/ML
3 INJECTION INTRAMUSCULAR; INTRAVENOUS; SUBCUTANEOUS EVERY 8 HOURS
Qty: 0 | Refills: 0 | Status: DISCONTINUED | OUTPATIENT
Start: 2017-06-29 | End: 2017-07-03

## 2017-06-16 RX ORDER — CEFUROXIME AXETIL 250 MG
1500 TABLET ORAL ONCE
Qty: 0 | Refills: 0 | Status: DISCONTINUED | OUTPATIENT
Start: 2017-06-29 | End: 2017-06-29

## 2017-06-16 NOTE — ASU PATIENT PROFILE, ADULT - LEARNING ASSESSMENT (PATIENT) ADDITIONAL COMMENTS
pre-op instructions, surgical wash, MRSA/MSSA & pain management reviewed pt  verbalized understanding

## 2017-06-16 NOTE — H&P PST ADULT - PSH
Abnormality of left atrial appendage  s/p Lariat 2012  AICD (automatic cardioverter/defibrillator) present  [employer] (2006)  Bariatric surgery status  gastric bypass 2001  Hernia  x2  History of breast surgery  "Lift"  Spinal cord stimulator status  [employer] (2014) recent battery change

## 2017-06-16 NOTE — H&P PST ADULT - LAB RESULTS AND INTERPRETATION
6/16/2017 Left message for William Farley to check computer regarding multiple abnormal LAB values (JEB GARCIA)

## 2017-06-16 NOTE — H&P PST ADULT - HISTORY OF PRESENT ILLNESS
This is a 71 y.o female who presents to PST today.  The pt reports an episode of CHF back in December 2016.  Recently she has become more short of breath, particularly at night causing her to wake up at times.  In addition she has been experiencing chest "pressure"

## 2017-06-16 NOTE — ASU PATIENT PROFILE, ADULT - PMH
Afib    Anemia    Arthritis    B12 deficiency anemia    Cardiomyopathy    CHF (congestive heart failure)  last episode  jan 2017   at  [facility], treated with diuretcss  Chronic back pain    COPD (chronic obstructive pulmonary disease)    Ejection fraction < 50%    GI bleed not requiring more than 4 units of blood in 24 hours, ICU, or surgery    HLD (hyperlipidemia)    HTN (hypertension)    Hypothyroid    MI (myocardial infarction)  3 times (2000,2002)  MR (mitral regurgitation)  moderate to severe  Raynaud's disease    Scleroderma    Sleep apnea  uses oral plate  Ventricular tachycardia

## 2017-06-16 NOTE — ASU PATIENT PROFILE, ADULT - PSH
Abnormality of left atrial appendage  s/p Lariat 2012  AICD (automatic cardioverter/defibrillator) present  [employer] (2006)  Bariatric surgery status  gastric bypass 2001  Hernia  x2  History of breast surgery  "Lift"  Spinal cord stimulator status  [employer] (2014) recent battery change

## 2017-06-16 NOTE — H&P PST ADULT - PMH
Afib    Anemia    Arthritis    B12 deficiency anemia    Cardiomyopathy    CHF (congestive heart failure)  last episode  jan 2017   at  [facility], treated with diuretcss  Chronic back pain    COPD (chronic obstructive pulmonary disease)    Ejection fraction < 50%    GI bleed not requiring more than 4 units of blood in 24 hours, ICU, or surgery    HLD (hyperlipidemia)    HTN (hypertension)    Hypothyroid    MI (myocardial infarction)  3 times (2000,2002)  MR (mitral regurgitation)  moderate to severe  Raynaud's disease    Scleroderma    Sleep apnea  uses oral plate  Ventricular tachycardia

## 2017-06-17 LAB
CULTURE RESULTS: SIGNIFICANT CHANGE UP
SPECIMEN SOURCE: SIGNIFICANT CHANGE UP

## 2017-06-19 ENCOUNTER — APPOINTMENT (OUTPATIENT)
Dept: PULMONOLOGY | Facility: CLINIC | Age: 72
End: 2017-06-19

## 2017-06-19 VITALS
RESPIRATION RATE: 16 BRPM | WEIGHT: 150 LBS | OXYGEN SATURATION: 94 % | SYSTOLIC BLOOD PRESSURE: 110 MMHG | DIASTOLIC BLOOD PRESSURE: 68 MMHG | BODY MASS INDEX: 29.3 KG/M2 | HEART RATE: 87 BPM

## 2017-06-19 DIAGNOSIS — Z01.89 ENCOUNTER FOR OTHER SPECIFIED SPECIAL EXAMINATIONS: ICD-10-CM

## 2017-06-29 ENCOUNTER — INPATIENT (INPATIENT)
Facility: HOSPITAL | Age: 72
LOS: 8 days | Discharge: HOME | DRG: 219 | End: 2017-07-08
Attending: THORACIC SURGERY (CARDIOTHORACIC VASCULAR SURGERY) | Admitting: THORACIC SURGERY (CARDIOTHORACIC VASCULAR SURGERY)
Payer: MEDICARE

## 2017-06-29 ENCOUNTER — APPOINTMENT (OUTPATIENT)
Dept: CARDIOTHORACIC SURGERY | Facility: HOSPITAL | Age: 72
End: 2017-06-29
Payer: MEDICARE

## 2017-06-29 VITALS
HEIGHT: 61 IN | DIASTOLIC BLOOD PRESSURE: 60 MMHG | OXYGEN SATURATION: 100 % | HEART RATE: 81 BPM | RESPIRATION RATE: 16 BRPM | SYSTOLIC BLOOD PRESSURE: 102 MMHG | TEMPERATURE: 98 F | WEIGHT: 149.91 LBS

## 2017-06-29 DIAGNOSIS — I07.1 RHEUMATIC TRICUSPID INSUFFICIENCY: ICD-10-CM

## 2017-06-29 DIAGNOSIS — Z98.89 OTHER SPECIFIED POSTPROCEDURAL STATES: Chronic | ICD-10-CM

## 2017-06-29 DIAGNOSIS — Q20.8 OTHER CONGENITAL MALFORMATIONS OF CARDIAC CHAMBERS AND CONNECTIONS: Chronic | ICD-10-CM

## 2017-06-29 DIAGNOSIS — Z95.810 PRESENCE OF AUTOMATIC (IMPLANTABLE) CARDIAC DEFIBRILLATOR: Chronic | ICD-10-CM

## 2017-06-29 LAB
ALBUMIN SERPL ELPH-MCNC: 3.2 G/DL — LOW (ref 3.3–5.2)
ALP SERPL-CCNC: 30 U/L — LOW (ref 40–120)
ALT FLD-CCNC: 16 U/L — SIGNIFICANT CHANGE UP
ANION GAP SERPL CALC-SCNC: 17 MMOL/L — SIGNIFICANT CHANGE UP (ref 5–17)
APTT BLD: 35.9 SEC — SIGNIFICANT CHANGE UP (ref 27.5–37.4)
AST SERPL-CCNC: 47 U/L — HIGH
BASE EXCESS BLDV CALC-SCNC: -1.2 MMOL/L — SIGNIFICANT CHANGE UP (ref -3–3)
BASE EXCESS BLDV CALC-SCNC: -3.5 MMOL/L — LOW (ref -3–3)
BASE EXCESS BLDV CALC-SCNC: -3.7 MMOL/L — LOW (ref -3–3)
BASE EXCESS BLDV CALC-SCNC: -4 MMOL/L — LOW (ref -3–3)
BASE EXCESS BLDV CALC-SCNC: -4.3 MMOL/L — LOW (ref -3–3)
BASE EXCESS BLDV CALC-SCNC: -5.6 MMOL/L — LOW (ref -3–3)
BILIRUB SERPL-MCNC: 1.3 MG/DL — SIGNIFICANT CHANGE UP (ref 0.4–2)
BLD GP AB SCN SERPL QL: SIGNIFICANT CHANGE UP
BLOOD GAS COMMENTS, VENOUS: SIGNIFICANT CHANGE UP
BUN SERPL-MCNC: 23 MG/DL — HIGH (ref 8–20)
CA-I SERPL-SCNC: 1.04 MMOL/L — LOW (ref 1.12–1.3)
CA-I SERPL-SCNC: 1.11 MMOL/L — LOW (ref 1.12–1.3)
CA-I SERPL-SCNC: 1.11 MMOL/L — LOW (ref 1.12–1.3)
CALCIUM SERPL-MCNC: 8.3 MG/DL — LOW (ref 8.6–10.2)
CHLORIDE BLDV-SCNC: 107 MMOL/L — HIGH (ref 98–106)
CHLORIDE BLDV-SCNC: 109 MMOL/L — HIGH (ref 98–106)
CHLORIDE BLDV-SCNC: 111 MMOL/L — HIGH (ref 98–106)
CHLORIDE SERPL-SCNC: 105 MMOL/L — SIGNIFICANT CHANGE UP (ref 98–107)
CK MB CFR SERPL CALC: 45.5 NG/ML — HIGH (ref 0–6.7)
CK SERPL-CCNC: 346 U/L — HIGH (ref 25–170)
CO2 SERPL-SCNC: 18 MMOL/L — LOW (ref 22–29)
CREAT SERPL-MCNC: 1.2 MG/DL — SIGNIFICANT CHANGE UP (ref 0.5–1.3)
GAS PNL BLDA: SIGNIFICANT CHANGE UP
GAS PNL BLDV: 135 MMOL/L — LOW (ref 136–146)
GAS PNL BLDV: 140 MMOL/L — SIGNIFICANT CHANGE UP (ref 136–146)
GAS PNL BLDV: 142 MMOL/L — SIGNIFICANT CHANGE UP (ref 136–146)
GAS PNL BLDV: SIGNIFICANT CHANGE UP
GLUCOSE BLDV-MCNC: 201 MG/DL — HIGH (ref 70–99)
GLUCOSE BLDV-MCNC: 227 MG/DL — HIGH (ref 70–99)
GLUCOSE BLDV-MCNC: 329 MG/DL — HIGH (ref 70–99)
GLUCOSE SERPL-MCNC: 171 MG/DL — HIGH (ref 70–115)
HCO3 BLDV-SCNC: 20 MMOL/L — SIGNIFICANT CHANGE UP (ref 20–26)
HCO3 BLDV-SCNC: 21 MMOL/L — SIGNIFICANT CHANGE UP (ref 20–26)
HCO3 BLDV-SCNC: 23 MMOL/L — SIGNIFICANT CHANGE UP (ref 20–26)
HCT VFR BLD CALC: 28.8 % — LOW (ref 37–47)
HCT VFR BLDA CALC: 28 — LOW (ref 39–50)
HCT VFR BLDA CALC: 31 — LOW (ref 39–50)
HCT VFR BLDA CALC: 36 — LOW (ref 39–50)
HGB BLD CALC-MCNC: 10.1 G/DL — LOW (ref 11.5–15.5)
HGB BLD CALC-MCNC: 11.7 G/DL — SIGNIFICANT CHANGE UP (ref 11.5–15.5)
HGB BLD CALC-MCNC: 9.2 G/DL — LOW (ref 11.5–15.5)
HGB BLD-MCNC: 9.3 G/DL — LOW (ref 12–16)
HOROWITZ INDEX BLDV+IHG-RTO: 0.4 — SIGNIFICANT CHANGE UP
HOROWITZ INDEX BLDV+IHG-RTO: 0.6 — SIGNIFICANT CHANGE UP
HOROWITZ INDEX BLDV+IHG-RTO: 0.7 — SIGNIFICANT CHANGE UP
HOROWITZ INDEX BLDV+IHG-RTO: SIGNIFICANT CHANGE UP
INR BLD: 1.39 RATIO — HIGH (ref 0.88–1.16)
LACTATE BLDV-MCNC: 3.6 MMOL/L — HIGH (ref 0.5–2)
LACTATE BLDV-MCNC: 3.9 MMOL/L — HIGH (ref 0.5–2)
LACTATE BLDV-MCNC: 5.8 MMOL/L — CRITICAL HIGH (ref 0.5–2)
MAGNESIUM SERPL-MCNC: 2.8 MG/DL — HIGH (ref 1.6–2.6)
MCHC RBC-ENTMCNC: 26.3 PG — LOW (ref 27–31)
MCHC RBC-ENTMCNC: 32.3 G/DL — SIGNIFICANT CHANGE UP (ref 32–36)
MCV RBC AUTO: 81.6 FL — SIGNIFICANT CHANGE UP (ref 81–99)
OTHER CELLS CSF MANUAL: 10 ML/DL — LOW (ref 18–22)
OTHER CELLS CSF MANUAL: 10 ML/DL — LOW (ref 18–22)
OTHER CELLS CSF MANUAL: 11 ML/DL — LOW (ref 18–22)
PCO2 BLDV: 39 MMHG — SIGNIFICANT CHANGE UP (ref 35–50)
PCO2 BLDV: 39 MMHG — SIGNIFICANT CHANGE UP (ref 35–50)
PCO2 BLDV: 40 MMHG — SIGNIFICANT CHANGE UP (ref 35–50)
PCO2 BLDV: 42 MMHG — SIGNIFICANT CHANGE UP (ref 35–50)
PCO2 BLDV: 53 MMHG — HIGH (ref 35–50)
PCO2 BLDV: 58 MMHG — HIGH (ref 35–50)
PH BLDV: 7.23 — LOW (ref 7.35–7.45)
PH BLDV: 7.28 — LOW (ref 7.35–7.45)
PH BLDV: 7.32 — LOW (ref 7.35–7.45)
PH BLDV: 7.35 — SIGNIFICANT CHANGE UP (ref 7.35–7.45)
PHOSPHATE SERPL-MCNC: 3.2 MG/DL — SIGNIFICANT CHANGE UP (ref 2.4–4.7)
PLATELET # BLD AUTO: 268 K/UL — SIGNIFICANT CHANGE UP (ref 150–400)
PO2 BLDV: 36 MMHG — SIGNIFICANT CHANGE UP (ref 25–45)
PO2 BLDV: 39 MMHG — SIGNIFICANT CHANGE UP (ref 25–45)
PO2 BLDV: 48 MMHG — HIGH (ref 25–45)
PO2 BLDV: 49 MMHG — HIGH (ref 25–45)
PO2 BLDV: 52 MMHG — HIGH (ref 25–45)
PO2 BLDV: 60 MMHG — HIGH (ref 25–45)
POTASSIUM BLDV-SCNC: 3.9 MMOL/L — SIGNIFICANT CHANGE UP (ref 3.4–4.5)
POTASSIUM BLDV-SCNC: 4 MMOL/L — SIGNIFICANT CHANGE UP (ref 3.4–4.5)
POTASSIUM BLDV-SCNC: 4.3 MMOL/L — SIGNIFICANT CHANGE UP (ref 3.4–4.5)
POTASSIUM SERPL-MCNC: 4.2 MMOL/L — SIGNIFICANT CHANGE UP (ref 3.5–5.3)
POTASSIUM SERPL-SCNC: 4.2 MMOL/L — SIGNIFICANT CHANGE UP (ref 3.5–5.3)
PROT SERPL-MCNC: 5.1 G/DL — LOW (ref 6.6–8.7)
PROTHROM AB SERPL-ACNC: 15.4 SEC — HIGH (ref 9.8–12.7)
RBC # BLD: 3.53 M/UL — LOW (ref 4.4–5.2)
RBC # FLD: 18.3 % — HIGH (ref 11–15.6)
SAO2 % BLDV: 68 % — SIGNIFICANT CHANGE UP (ref 67–88)
SAO2 % BLDV: 69 % — SIGNIFICANT CHANGE UP (ref 67–88)
SAO2 % BLDV: 77 % — SIGNIFICANT CHANGE UP (ref 67–88)
SAO2 % BLDV: 82 % — SIGNIFICANT CHANGE UP (ref 67–88)
SAO2 % BLDV: 87 % — SIGNIFICANT CHANGE UP (ref 67–88)
SAO2 % BLDV: 92 % — HIGH (ref 67–88)
SODIUM SERPL-SCNC: 140 MMOL/L — SIGNIFICANT CHANGE UP (ref 135–145)
TROPONIN T SERPL-MCNC: 1.29 NG/ML — HIGH (ref 0–0.06)
WBC # BLD: 13.6 K/UL — HIGH (ref 4.8–10.8)
WBC # FLD AUTO: 13.6 K/UL — HIGH (ref 4.8–10.8)

## 2017-06-29 PROCEDURE — 71010: CPT | Mod: 26

## 2017-06-29 PROCEDURE — 93355 ECHO TRANSESOPHAGEAL (TEE): CPT

## 2017-06-29 PROCEDURE — 33967 INSERT I-AORT PERCUT DEVICE: CPT | Mod: AS

## 2017-06-29 PROCEDURE — 33967 INSERT I-AORT PERCUT DEVICE: CPT

## 2017-06-29 PROCEDURE — 33427 REPAIR OF MITRAL VALVE: CPT | Mod: AS

## 2017-06-29 PROCEDURE — 33464 VALVULOPLASTY TRICUSPID: CPT | Mod: AS

## 2017-06-29 PROCEDURE — 33464 VALVULOPLASTY TRICUSPID: CPT

## 2017-06-29 PROCEDURE — 33427 REPAIR OF MITRAL VALVE: CPT

## 2017-06-29 PROCEDURE — 93010 ELECTROCARDIOGRAM REPORT: CPT

## 2017-06-29 RX ORDER — SODIUM CHLORIDE 9 MG/ML
500 INJECTION, SOLUTION INTRAVENOUS ONCE
Qty: 0 | Refills: 0 | Status: COMPLETED | OUTPATIENT
Start: 2017-06-29 | End: 2017-06-29

## 2017-06-29 RX ORDER — POTASSIUM CHLORIDE 20 MEQ
10 PACKET (EA) ORAL
Qty: 0 | Refills: 0 | Status: DISCONTINUED | OUTPATIENT
Start: 2017-06-29 | End: 2017-06-30

## 2017-06-29 RX ORDER — EPINEPHRINE 0.3 MG/.3ML
0.18 INJECTION INTRAMUSCULAR; SUBCUTANEOUS
Qty: 4 | Refills: 0 | Status: DISCONTINUED | OUTPATIENT
Start: 2017-06-29 | End: 2017-06-29

## 2017-06-29 RX ORDER — NOREPINEPHRINE BITARTRATE/D5W 8 MG/250ML
0.02 PLASTIC BAG, INJECTION (ML) INTRAVENOUS
Qty: 8 | Refills: 0 | Status: DISCONTINUED | OUTPATIENT
Start: 2017-06-29 | End: 2017-06-29

## 2017-06-29 RX ORDER — DOCUSATE SODIUM 100 MG
2 CAPSULE ORAL
Qty: 0 | Refills: 0 | COMMUNITY

## 2017-06-29 RX ORDER — MILRINONE LACTATE 1 MG/ML
0.2 INJECTION, SOLUTION INTRAVENOUS
Qty: 20 | Refills: 0 | Status: DISCONTINUED | OUTPATIENT
Start: 2017-06-29 | End: 2017-07-05

## 2017-06-29 RX ORDER — AZTREONAM 2 G
1000 VIAL (EA) INJECTION EVERY 8 HOURS
Qty: 0 | Refills: 0 | Status: COMPLETED | OUTPATIENT
Start: 2017-06-29 | End: 2017-07-01

## 2017-06-29 RX ORDER — SODIUM CHLORIDE 9 MG/ML
1000 INJECTION INTRAMUSCULAR; INTRAVENOUS; SUBCUTANEOUS
Qty: 0 | Refills: 0 | Status: DISCONTINUED | OUTPATIENT
Start: 2017-06-29 | End: 2017-07-05

## 2017-06-29 RX ORDER — VECURONIUM BROMIDE 20 MG/1
10 INJECTION, POWDER, FOR SOLUTION INTRAVENOUS ONCE
Qty: 0 | Refills: 0 | Status: COMPLETED | OUTPATIENT
Start: 2017-06-29 | End: 2017-06-29

## 2017-06-29 RX ORDER — DOBUTAMINE HCL 250MG/20ML
2.5 VIAL (ML) INTRAVENOUS
Qty: 500 | Refills: 0 | Status: DISCONTINUED | OUTPATIENT
Start: 2017-06-29 | End: 2017-07-04

## 2017-06-29 RX ORDER — FENTANYL CITRATE 50 UG/ML
50 INJECTION INTRAVENOUS ONCE
Qty: 0 | Refills: 0 | Status: DISCONTINUED | OUTPATIENT
Start: 2017-06-29 | End: 2017-06-29

## 2017-06-29 RX ORDER — SODIUM BICARBONATE 1 MEQ/ML
50 SYRINGE (ML) INTRAVENOUS ONCE
Qty: 0 | Refills: 0 | Status: COMPLETED | OUTPATIENT
Start: 2017-06-29 | End: 2017-06-29

## 2017-06-29 RX ORDER — LEVOTHYROXINE SODIUM 125 MCG
50 TABLET ORAL DAILY
Qty: 0 | Refills: 0 | Status: DISCONTINUED | OUTPATIENT
Start: 2017-06-30 | End: 2017-07-01

## 2017-06-29 RX ORDER — POTASSIUM CHLORIDE 20 MEQ
10 PACKET (EA) ORAL
Qty: 0 | Refills: 0 | Status: COMPLETED | OUTPATIENT
Start: 2017-06-29 | End: 2017-06-29

## 2017-06-29 RX ORDER — PANTOPRAZOLE SODIUM 20 MG/1
40 TABLET, DELAYED RELEASE ORAL DAILY
Qty: 0 | Refills: 0 | Status: DISCONTINUED | OUTPATIENT
Start: 2017-06-29 | End: 2017-07-01

## 2017-06-29 RX ORDER — PREGABALIN 225 MG/1
1 CAPSULE ORAL
Qty: 0 | Refills: 0 | COMMUNITY

## 2017-06-29 RX ORDER — VECURONIUM BROMIDE 20 MG/1
5 INJECTION, POWDER, FOR SOLUTION INTRAVENOUS ONCE
Qty: 0 | Refills: 0 | Status: DISCONTINUED | OUTPATIENT
Start: 2017-06-29 | End: 2017-06-29

## 2017-06-29 RX ORDER — POTASSIUM CHLORIDE 20 MEQ
10 PACKET (EA) ORAL ONCE
Qty: 0 | Refills: 0 | Status: DISCONTINUED | OUTPATIENT
Start: 2017-06-29 | End: 2017-06-30

## 2017-06-29 RX ORDER — MAGNESIUM SULFATE 500 MG/ML
2 VIAL (ML) INJECTION ONCE
Qty: 0 | Refills: 0 | Status: COMPLETED | OUTPATIENT
Start: 2017-06-29 | End: 2017-06-29

## 2017-06-29 RX ORDER — DEXAMETHASONE 0.5 MG/5ML
4 ELIXIR ORAL EVERY 8 HOURS
Qty: 0 | Refills: 0 | Status: COMPLETED | OUTPATIENT
Start: 2017-06-29 | End: 2017-06-30

## 2017-06-29 RX ORDER — POTASSIUM CHLORIDE 20 MEQ
10 PACKET (EA) ORAL
Qty: 0 | Refills: 0 | Status: COMPLETED | OUTPATIENT
Start: 2017-06-29 | End: 2017-06-30

## 2017-06-29 RX ORDER — AMIODARONE HYDROCHLORIDE 400 MG/1
150 TABLET ORAL ONCE
Qty: 0 | Refills: 0 | Status: COMPLETED | OUTPATIENT
Start: 2017-06-29 | End: 2017-06-29

## 2017-06-29 RX ORDER — PROPOFOL 10 MG/ML
5 INJECTION, EMULSION INTRAVENOUS
Qty: 1000 | Refills: 0 | Status: DISCONTINUED | OUTPATIENT
Start: 2017-06-29 | End: 2017-06-30

## 2017-06-29 RX ORDER — ALBUMIN HUMAN 25 %
250 VIAL (ML) INTRAVENOUS ONCE
Qty: 0 | Refills: 0 | Status: DISCONTINUED | OUTPATIENT
Start: 2017-06-29 | End: 2017-06-30

## 2017-06-29 RX ORDER — INSULIN HUMAN 100 [IU]/ML
1 INJECTION, SOLUTION SUBCUTANEOUS
Qty: 250 | Refills: 0 | Status: DISCONTINUED | OUTPATIENT
Start: 2017-06-29 | End: 2017-07-03

## 2017-06-29 RX ORDER — AZTREONAM 2 G
1000 VIAL (EA) INJECTION EVERY 8 HOURS
Qty: 0 | Refills: 0 | Status: DISCONTINUED | OUTPATIENT
Start: 2017-06-29 | End: 2017-06-29

## 2017-06-29 RX ORDER — CHLORHEXIDINE GLUCONATE 213 G/1000ML
5 SOLUTION TOPICAL EVERY 4 HOURS
Qty: 0 | Refills: 0 | Status: DISCONTINUED | OUTPATIENT
Start: 2017-06-29 | End: 2017-06-30

## 2017-06-29 RX ORDER — DOCUSATE SODIUM 100 MG
100 CAPSULE ORAL THREE TIMES A DAY
Qty: 0 | Refills: 0 | Status: DISCONTINUED | OUTPATIENT
Start: 2017-06-29 | End: 2017-07-08

## 2017-06-29 RX ORDER — VASOPRESSIN 20 [USP'U]/ML
0.05 INJECTION INTRAVENOUS
Qty: 100 | Refills: 0 | Status: DISCONTINUED | OUTPATIENT
Start: 2017-06-29 | End: 2017-07-01

## 2017-06-29 RX ORDER — VANCOMYCIN HCL 1 G
1000 VIAL (EA) INTRAVENOUS EVERY 12 HOURS
Qty: 0 | Refills: 0 | Status: COMPLETED | OUTPATIENT
Start: 2017-06-29 | End: 2017-07-01

## 2017-06-29 RX ORDER — SODIUM CHLORIDE 9 MG/ML
500 INJECTION, SOLUTION INTRAVENOUS ONCE
Qty: 0 | Refills: 0 | Status: COMPLETED | OUTPATIENT
Start: 2017-06-29

## 2017-06-29 RX ADMIN — VECURONIUM BROMIDE 10 MILLIGRAM(S): 20 INJECTION, POWDER, FOR SOLUTION INTRAVENOUS at 22:30

## 2017-06-29 RX ADMIN — SODIUM CHLORIDE 3000 MILLILITER(S): 9 INJECTION, SOLUTION INTRAVENOUS at 15:21

## 2017-06-29 RX ADMIN — Medication 50 MILLIGRAM(S): at 23:41

## 2017-06-29 RX ADMIN — Medication 50 MILLIEQUIVALENT(S): at 15:01

## 2017-06-29 RX ADMIN — FENTANYL CITRATE 50 MICROGRAM(S): 50 INJECTION INTRAVENOUS at 19:35

## 2017-06-29 RX ADMIN — INSULIN HUMAN 1 UNIT(S)/HR: 100 INJECTION, SOLUTION SUBCUTANEOUS at 21:19

## 2017-06-29 RX ADMIN — Medication 4 MILLIGRAM(S): at 18:19

## 2017-06-29 RX ADMIN — CHLORHEXIDINE GLUCONATE 5 MILLILITER(S): 213 SOLUTION TOPICAL at 18:20

## 2017-06-29 RX ADMIN — MILRINONE LACTATE 10.2 MICROGRAM(S)/KG/MIN: 1 INJECTION, SOLUTION INTRAVENOUS at 18:19

## 2017-06-29 RX ADMIN — Medication 50 MILLIEQUIVALENT(S): at 18:30

## 2017-06-29 RX ADMIN — AMIODARONE HYDROCHLORIDE 618 MILLIGRAM(S): 400 TABLET ORAL at 21:20

## 2017-06-29 RX ADMIN — Medication 10.2 MICROGRAM(S)/KG/MIN: at 21:18

## 2017-06-29 RX ADMIN — PANTOPRAZOLE SODIUM 40 MILLIGRAM(S): 20 TABLET, DELAYED RELEASE ORAL at 18:19

## 2017-06-29 RX ADMIN — Medication 100 MILLIEQUIVALENT(S): at 22:30

## 2017-06-29 RX ADMIN — Medication 4 MILLIGRAM(S): at 22:12

## 2017-06-29 RX ADMIN — MILRINONE LACTATE 10.2 MICROGRAM(S)/KG/MIN: 1 INJECTION, SOLUTION INTRAVENOUS at 21:19

## 2017-06-29 RX ADMIN — FENTANYL CITRATE 50 MICROGRAM(S): 50 INJECTION INTRAVENOUS at 18:21

## 2017-06-29 RX ADMIN — Medication 250 MILLIGRAM(S): at 21:21

## 2017-06-29 RX ADMIN — SODIUM CHLORIDE 10 MILLILITER(S): 9 INJECTION INTRAMUSCULAR; INTRAVENOUS; SUBCUTANEOUS at 21:19

## 2017-06-29 RX ADMIN — SODIUM CHLORIDE 3000 MILLILITER(S): 9 INJECTION, SOLUTION INTRAVENOUS at 17:05

## 2017-06-29 RX ADMIN — Medication 10.2 MICROGRAM(S)/KG/MIN: at 16:12

## 2017-06-29 RX ADMIN — Medication 100 MILLIEQUIVALENT(S): at 23:42

## 2017-06-29 RX ADMIN — VECURONIUM BROMIDE 10 MILLIGRAM(S): 20 INJECTION, POWDER, FOR SOLUTION INTRAVENOUS at 22:10

## 2017-06-29 RX ADMIN — VASOPRESSIN 3 UNIT(S)/MIN: 20 INJECTION INTRAVENOUS at 21:20

## 2017-06-29 RX ADMIN — MILRINONE LACTATE 10.2 MICROGRAM(S)/KG/MIN: 1 INJECTION, SOLUTION INTRAVENOUS at 16:13

## 2017-06-29 RX ADMIN — CHLORHEXIDINE GLUCONATE 5 MILLILITER(S): 213 SOLUTION TOPICAL at 21:22

## 2017-06-29 RX ADMIN — EPINEPHRINE 45 MICROGRAM(S)/KG/MIN: 0.3 INJECTION INTRAMUSCULAR; SUBCUTANEOUS at 16:12

## 2017-06-29 RX ADMIN — Medication 50 MILLIEQUIVALENT(S): at 22:10

## 2017-06-29 RX ADMIN — Medication 50 MILLIEQUIVALENT(S): at 19:21

## 2017-06-29 RX ADMIN — Medication 2 MICROGRAM(S)/KG/MIN: at 16:12

## 2017-06-29 RX ADMIN — PROPOFOL 2.04 MICROGRAM(S)/KG/MIN: 10 INJECTION, EMULSION INTRAVENOUS at 22:30

## 2017-06-29 RX ADMIN — SODIUM CHLORIDE 5 MILLILITER(S): 9 INJECTION INTRAMUSCULAR; INTRAVENOUS; SUBCUTANEOUS at 21:20

## 2017-06-29 RX ADMIN — Medication 50 MILLIGRAM(S): at 16:11

## 2017-06-29 RX ADMIN — SODIUM CHLORIDE 3 MILLILITER(S): 9 INJECTION INTRAMUSCULAR; INTRAVENOUS; SUBCUTANEOUS at 21:24

## 2017-06-29 RX ADMIN — Medication 100 MILLIEQUIVALENT(S): at 21:23

## 2017-06-29 RX ADMIN — Medication 50 GRAM(S): at 15:01

## 2017-06-29 NOTE — BRIEF OPERATIVE NOTE - PROCEDURE
Tricuspid valve repair  06/29/2017    Active  RLESTON  Mitral valve repair  06/29/2017    Active  RLESTON

## 2017-06-29 NOTE — BRIEF OPERATIVE NOTE - POST-OP DX
Severe mitral regurgitation  06/29/2017    Dewey Jade  Tricuspid valve insufficiency, unspecified etiology  06/29/2017    Dewey Jade

## 2017-06-30 DIAGNOSIS — E03.9 HYPOTHYROIDISM, UNSPECIFIED: ICD-10-CM

## 2017-06-30 DIAGNOSIS — D63.8 ANEMIA IN OTHER CHRONIC DISEASES CLASSIFIED ELSEWHERE: ICD-10-CM

## 2017-06-30 DIAGNOSIS — I48.2 CHRONIC ATRIAL FIBRILLATION: ICD-10-CM

## 2017-06-30 DIAGNOSIS — J41.0 SIMPLE CHRONIC BRONCHITIS: ICD-10-CM

## 2017-06-30 DIAGNOSIS — I50.23 ACUTE ON CHRONIC SYSTOLIC (CONGESTIVE) HEART FAILURE: ICD-10-CM

## 2017-06-30 DIAGNOSIS — I10 ESSENTIAL (PRIMARY) HYPERTENSION: ICD-10-CM

## 2017-06-30 DIAGNOSIS — E87.2 ACIDOSIS: ICD-10-CM

## 2017-06-30 DIAGNOSIS — I36.1 NONRHEUMATIC TRICUSPID (VALVE) INSUFFICIENCY: ICD-10-CM

## 2017-06-30 DIAGNOSIS — I34.0 NONRHEUMATIC MITRAL (VALVE) INSUFFICIENCY: ICD-10-CM

## 2017-06-30 DIAGNOSIS — R57.0 CARDIOGENIC SHOCK: ICD-10-CM

## 2017-06-30 LAB
ALBUMIN SERPL ELPH-MCNC: 3.4 G/DL — SIGNIFICANT CHANGE UP (ref 3.3–5.2)
ALP SERPL-CCNC: 28 U/L — LOW (ref 40–120)
ALT FLD-CCNC: 19 U/L — SIGNIFICANT CHANGE UP
ANION GAP SERPL CALC-SCNC: 17 MMOL/L — SIGNIFICANT CHANGE UP (ref 5–17)
ANION GAP SERPL CALC-SCNC: 18 MMOL/L — HIGH (ref 5–17)
APTT BLD: 25.9 SEC — LOW (ref 27.5–37.4)
AST SERPL-CCNC: 58 U/L — HIGH
BASE EXCESS BLDV CALC-SCNC: -4.1 MMOL/L — LOW (ref -3–3)
BILIRUB SERPL-MCNC: 0.9 MG/DL — SIGNIFICANT CHANGE UP (ref 0.4–2)
BUN SERPL-MCNC: 28 MG/DL — HIGH (ref 8–20)
BUN SERPL-MCNC: 31 MG/DL — HIGH (ref 8–20)
CALCIUM SERPL-MCNC: 7.5 MG/DL — LOW (ref 8.6–10.2)
CALCIUM SERPL-MCNC: 7.6 MG/DL — LOW (ref 8.6–10.2)
CHLORIDE SERPL-SCNC: 104 MMOL/L — SIGNIFICANT CHANGE UP (ref 98–107)
CHLORIDE SERPL-SCNC: 105 MMOL/L — SIGNIFICANT CHANGE UP (ref 98–107)
CK MB CFR SERPL CALC: 33.8 NG/ML — HIGH (ref 0–6.7)
CK SERPL-CCNC: 348 U/L — HIGH (ref 25–170)
CO2 SERPL-SCNC: 18 MMOL/L — LOW (ref 22–29)
CO2 SERPL-SCNC: 19 MMOL/L — LOW (ref 22–29)
CREAT SERPL-MCNC: 1.37 MG/DL — HIGH (ref 0.5–1.3)
CREAT SERPL-MCNC: 1.51 MG/DL — HIGH (ref 0.5–1.3)
GAS PNL BLDA: SIGNIFICANT CHANGE UP
GAS PNL BLDV: SIGNIFICANT CHANGE UP
GLUCOSE SERPL-MCNC: 136 MG/DL — HIGH (ref 70–115)
GLUCOSE SERPL-MCNC: 137 MG/DL — HIGH (ref 70–115)
HCO3 BLDV-SCNC: 20 MMOL/L — SIGNIFICANT CHANGE UP (ref 20–26)
HCT VFR BLD CALC: 26.4 % — LOW (ref 37–47)
HCT VFR BLD CALC: 29.6 % — LOW (ref 37–47)
HCT VFR BLD CALC: 32 % — LOW (ref 37–47)
HGB BLD-MCNC: 10 G/DL — LOW (ref 12–16)
HGB BLD-MCNC: 10.7 G/DL — LOW (ref 12–16)
HGB BLD-MCNC: 8.8 G/DL — LOW (ref 12–16)
HOROWITZ INDEX BLDV+IHG-RTO: 0.4 — SIGNIFICANT CHANGE UP
INR BLD: 1.27 RATIO — HIGH (ref 0.88–1.16)
MAGNESIUM SERPL-MCNC: 2.9 MG/DL — HIGH (ref 1.6–2.6)
MAGNESIUM SERPL-MCNC: 3.2 MG/DL — HIGH (ref 1.6–2.6)
MCHC RBC-ENTMCNC: 26.9 PG — LOW (ref 27–31)
MCHC RBC-ENTMCNC: 27 PG — SIGNIFICANT CHANGE UP (ref 27–31)
MCHC RBC-ENTMCNC: 27.2 PG — SIGNIFICANT CHANGE UP (ref 27–31)
MCHC RBC-ENTMCNC: 33.3 G/DL — SIGNIFICANT CHANGE UP (ref 32–36)
MCHC RBC-ENTMCNC: 33.4 G/DL — SIGNIFICANT CHANGE UP (ref 32–36)
MCHC RBC-ENTMCNC: 33.8 G/DL — SIGNIFICANT CHANGE UP (ref 32–36)
MCV RBC AUTO: 80.6 FL — LOW (ref 81–99)
MCV RBC AUTO: 80.7 FL — LOW (ref 81–99)
MCV RBC AUTO: 80.7 FL — LOW (ref 81–99)
PCO2 BLDV: 38 MMHG — SIGNIFICANT CHANGE UP (ref 35–50)
PH BLDV: 7.35 — SIGNIFICANT CHANGE UP (ref 7.35–7.45)
PHOSPHATE SERPL-MCNC: 2.5 MG/DL — SIGNIFICANT CHANGE UP (ref 2.4–4.7)
PLATELET # BLD AUTO: 142 K/UL — LOW (ref 150–400)
PLATELET # BLD AUTO: 164 K/UL — SIGNIFICANT CHANGE UP (ref 150–400)
PLATELET # BLD AUTO: 171 K/UL — SIGNIFICANT CHANGE UP (ref 150–400)
PO2 BLDV: 32 MMHG — SIGNIFICANT CHANGE UP (ref 25–45)
POTASSIUM SERPL-MCNC: 4.6 MMOL/L — SIGNIFICANT CHANGE UP (ref 3.5–5.3)
POTASSIUM SERPL-MCNC: 5.6 MMOL/L — HIGH (ref 3.5–5.3)
POTASSIUM SERPL-SCNC: 4.6 MMOL/L — SIGNIFICANT CHANGE UP (ref 3.5–5.3)
POTASSIUM SERPL-SCNC: 5.6 MMOL/L — HIGH (ref 3.5–5.3)
PROT SERPL-MCNC: 5.1 G/DL — LOW (ref 6.6–8.7)
PROTHROM AB SERPL-ACNC: 14 SEC — HIGH (ref 9.8–12.7)
RBC # BLD: 3.27 M/UL — LOW (ref 4.4–5.2)
RBC # BLD: 3.67 M/UL — LOW (ref 4.4–5.2)
RBC # BLD: 3.97 M/UL — LOW (ref 4.4–5.2)
RBC # FLD: 17.5 % — HIGH (ref 11–15.6)
RBC # FLD: 18 % — HIGH (ref 11–15.6)
RBC # FLD: 18.1 % — HIGH (ref 11–15.6)
SAO2 % BLDV: 62 % — LOW (ref 67–88)
SODIUM SERPL-SCNC: 139 MMOL/L — SIGNIFICANT CHANGE UP (ref 135–145)
SODIUM SERPL-SCNC: 142 MMOL/L — SIGNIFICANT CHANGE UP (ref 135–145)
TROPONIN T SERPL-MCNC: 0.89 NG/ML — HIGH (ref 0–0.06)
WBC # BLD: 13.5 K/UL — HIGH (ref 4.8–10.8)
WBC # BLD: 13.8 K/UL — HIGH (ref 4.8–10.8)
WBC # BLD: 8.4 K/UL — SIGNIFICANT CHANGE UP (ref 4.8–10.8)
WBC # FLD AUTO: 13.5 K/UL — HIGH (ref 4.8–10.8)
WBC # FLD AUTO: 13.8 K/UL — HIGH (ref 4.8–10.8)
WBC # FLD AUTO: 8.4 K/UL — SIGNIFICANT CHANGE UP (ref 4.8–10.8)

## 2017-06-30 PROCEDURE — 71010: CPT | Mod: 26

## 2017-06-30 PROCEDURE — 93010 ELECTROCARDIOGRAM REPORT: CPT

## 2017-06-30 PROCEDURE — 93010 ELECTROCARDIOGRAM REPORT: CPT | Mod: 77

## 2017-06-30 RX ORDER — TIOTROPIUM BROMIDE 18 UG/1
1 CAPSULE ORAL; RESPIRATORY (INHALATION) DAILY
Qty: 0 | Refills: 0 | Status: DISCONTINUED | OUTPATIENT
Start: 2017-06-30 | End: 2017-07-08

## 2017-06-30 RX ORDER — AMIODARONE HYDROCHLORIDE 400 MG/1
400 TABLET ORAL EVERY 8 HOURS
Qty: 0 | Refills: 0 | Status: COMPLETED | OUTPATIENT
Start: 2017-06-30 | End: 2017-07-04

## 2017-06-30 RX ORDER — IPRATROPIUM/ALBUTEROL SULFATE 18-103MCG
3 AEROSOL WITH ADAPTER (GRAM) INHALATION EVERY 6 HOURS
Qty: 0 | Refills: 0 | Status: DISCONTINUED | OUTPATIENT
Start: 2017-06-30 | End: 2017-07-02

## 2017-06-30 RX ORDER — ACETAMINOPHEN 500 MG
1000 TABLET ORAL ONCE
Qty: 0 | Refills: 0 | Status: COMPLETED | OUTPATIENT
Start: 2017-06-30 | End: 2017-06-30

## 2017-06-30 RX ORDER — ASPIRIN/CALCIUM CARB/MAGNESIUM 324 MG
325 TABLET ORAL DAILY
Qty: 0 | Refills: 0 | Status: DISCONTINUED | OUTPATIENT
Start: 2017-06-30 | End: 2017-07-08

## 2017-06-30 RX ORDER — ATORVASTATIN CALCIUM 80 MG/1
10 TABLET, FILM COATED ORAL AT BEDTIME
Qty: 0 | Refills: 0 | Status: DISCONTINUED | OUTPATIENT
Start: 2017-06-30 | End: 2017-07-08

## 2017-06-30 RX ORDER — ALBUMIN HUMAN 25 %
250 VIAL (ML) INTRAVENOUS ONCE
Qty: 0 | Refills: 0 | Status: COMPLETED | OUTPATIENT
Start: 2017-06-30 | End: 2017-06-30

## 2017-06-30 RX ORDER — DULOXETINE HYDROCHLORIDE 30 MG/1
60 CAPSULE, DELAYED RELEASE ORAL DAILY
Qty: 0 | Refills: 0 | Status: DISCONTINUED | OUTPATIENT
Start: 2017-06-30 | End: 2017-07-08

## 2017-06-30 RX ORDER — OXYCODONE HYDROCHLORIDE 5 MG/1
10 TABLET ORAL EVERY 4 HOURS
Qty: 0 | Refills: 0 | Status: DISCONTINUED | OUTPATIENT
Start: 2017-06-30 | End: 2017-07-07

## 2017-06-30 RX ORDER — OXYCODONE HYDROCHLORIDE 5 MG/1
5 TABLET ORAL EVERY 4 HOURS
Qty: 0 | Refills: 0 | Status: DISCONTINUED | OUTPATIENT
Start: 2017-06-30 | End: 2017-06-30

## 2017-06-30 RX ORDER — POTASSIUM CHLORIDE 20 MEQ
10 PACKET (EA) ORAL ONCE
Qty: 0 | Refills: 0 | Status: COMPLETED | OUTPATIENT
Start: 2017-06-30 | End: 2017-06-30

## 2017-06-30 RX ORDER — NOREPINEPHRINE BITARTRATE/D5W 8 MG/250ML
0.01 PLASTIC BAG, INJECTION (ML) INTRAVENOUS
Qty: 8 | Refills: 0 | Status: DISCONTINUED | OUTPATIENT
Start: 2017-06-30 | End: 2017-07-01

## 2017-06-30 RX ORDER — ONDANSETRON 8 MG/1
4 TABLET, FILM COATED ORAL ONCE
Qty: 0 | Refills: 0 | Status: COMPLETED | OUTPATIENT
Start: 2017-06-30 | End: 2017-06-30

## 2017-06-30 RX ORDER — FENTANYL CITRATE 50 UG/ML
25 INJECTION INTRAVENOUS ONCE
Qty: 0 | Refills: 0 | Status: DISCONTINUED | OUTPATIENT
Start: 2017-06-30 | End: 2017-06-30

## 2017-06-30 RX ORDER — FENTANYL CITRATE 50 UG/ML
50 INJECTION INTRAVENOUS
Qty: 0 | Refills: 0 | Status: DISCONTINUED | OUTPATIENT
Start: 2017-06-30 | End: 2017-06-30

## 2017-06-30 RX ORDER — INSULIN LISPRO 100/ML
2 VIAL (ML) SUBCUTANEOUS
Qty: 0 | Refills: 0 | Status: DISCONTINUED | OUTPATIENT
Start: 2017-06-30 | End: 2017-07-02

## 2017-06-30 RX ORDER — FUROSEMIDE 40 MG
20 TABLET ORAL ONCE
Qty: 0 | Refills: 0 | Status: COMPLETED | OUTPATIENT
Start: 2017-06-30 | End: 2017-06-30

## 2017-06-30 RX ORDER — AMIODARONE HYDROCHLORIDE 400 MG/1
200 TABLET ORAL DAILY
Qty: 0 | Refills: 0 | Status: COMPLETED | OUTPATIENT
Start: 2017-07-04 | End: 2018-06-02

## 2017-06-30 RX ORDER — AMIODARONE HYDROCHLORIDE 400 MG/1
150 TABLET ORAL ONCE
Qty: 0 | Refills: 0 | Status: COMPLETED | OUTPATIENT
Start: 2017-06-30 | End: 2017-06-30

## 2017-06-30 RX ORDER — FENTANYL CITRATE 50 UG/ML
50 INJECTION INTRAVENOUS ONCE
Qty: 0 | Refills: 0 | Status: DISCONTINUED | OUTPATIENT
Start: 2017-06-30 | End: 2017-06-30

## 2017-06-30 RX ADMIN — SODIUM CHLORIDE 3 MILLILITER(S): 9 INJECTION INTRAMUSCULAR; INTRAVENOUS; SUBCUTANEOUS at 21:02

## 2017-06-30 RX ADMIN — SODIUM CHLORIDE 3 MILLILITER(S): 9 INJECTION INTRAMUSCULAR; INTRAVENOUS; SUBCUTANEOUS at 14:30

## 2017-06-30 RX ADMIN — Medication 100 MILLIGRAM(S): at 21:06

## 2017-06-30 RX ADMIN — FENTANYL CITRATE 50 MICROGRAM(S): 50 INJECTION INTRAVENOUS at 02:20

## 2017-06-30 RX ADMIN — FENTANYL CITRATE 50 MICROGRAM(S): 50 INJECTION INTRAVENOUS at 02:21

## 2017-06-30 RX ADMIN — Medication 100 MILLIEQUIVALENT(S): at 00:50

## 2017-06-30 RX ADMIN — Medication 100 MILLIEQUIVALENT(S): at 04:14

## 2017-06-30 RX ADMIN — CHLORHEXIDINE GLUCONATE 5 MILLILITER(S): 213 SOLUTION TOPICAL at 01:39

## 2017-06-30 RX ADMIN — OXYCODONE HYDROCHLORIDE 10 MILLIGRAM(S): 5 TABLET ORAL at 15:08

## 2017-06-30 RX ADMIN — SODIUM CHLORIDE 3 MILLILITER(S): 9 INJECTION INTRAMUSCULAR; INTRAVENOUS; SUBCUTANEOUS at 06:07

## 2017-06-30 RX ADMIN — MILRINONE LACTATE 7.65 MICROGRAM(S)/KG/MIN: 1 INJECTION, SOLUTION INTRAVENOUS at 13:31

## 2017-06-30 RX ADMIN — Medication 100 MILLIGRAM(S): at 13:35

## 2017-06-30 RX ADMIN — Medication 4 MILLIGRAM(S): at 06:15

## 2017-06-30 RX ADMIN — OXYCODONE HYDROCHLORIDE 10 MILLIGRAM(S): 5 TABLET ORAL at 19:35

## 2017-06-30 RX ADMIN — Medication 1 MICROGRAM(S)/KG/MIN: at 19:36

## 2017-06-30 RX ADMIN — Medication 1 MICROGRAM(S)/KG/MIN: at 07:24

## 2017-06-30 RX ADMIN — VASOPRESSIN 3 UNIT(S)/MIN: 20 INJECTION INTRAVENOUS at 07:22

## 2017-06-30 RX ADMIN — DULOXETINE HYDROCHLORIDE 60 MILLIGRAM(S): 30 CAPSULE, DELAYED RELEASE ORAL at 11:44

## 2017-06-30 RX ADMIN — ONDANSETRON 4 MILLIGRAM(S): 8 TABLET, FILM COATED ORAL at 23:36

## 2017-06-30 RX ADMIN — AMIODARONE HYDROCHLORIDE 618 MILLIGRAM(S): 400 TABLET ORAL at 23:44

## 2017-06-30 RX ADMIN — Medication 10.2 MICROGRAM(S)/KG/MIN: at 14:22

## 2017-06-30 RX ADMIN — FENTANYL CITRATE 25 MICROGRAM(S): 50 INJECTION INTRAVENOUS at 09:40

## 2017-06-30 RX ADMIN — Medication 3 MILLILITER(S): at 14:01

## 2017-06-30 RX ADMIN — MILRINONE LACTATE 7.65 MICROGRAM(S)/KG/MIN: 1 INJECTION, SOLUTION INTRAVENOUS at 19:36

## 2017-06-30 RX ADMIN — Medication 400 MILLIGRAM(S): at 13:30

## 2017-06-30 RX ADMIN — Medication 1000 MILLIGRAM(S): at 07:10

## 2017-06-30 RX ADMIN — Medication 50 MICROGRAM(S): at 06:15

## 2017-06-30 RX ADMIN — OXYCODONE HYDROCHLORIDE 10 MILLIGRAM(S): 5 TABLET ORAL at 16:08

## 2017-06-30 RX ADMIN — FENTANYL CITRATE 50 MICROGRAM(S): 50 INJECTION INTRAVENOUS at 04:13

## 2017-06-30 RX ADMIN — Medication 1000 MILLIGRAM(S): at 14:30

## 2017-06-30 RX ADMIN — FENTANYL CITRATE 25 MICROGRAM(S): 50 INJECTION INTRAVENOUS at 08:00

## 2017-06-30 RX ADMIN — OXYCODONE HYDROCHLORIDE 10 MILLIGRAM(S): 5 TABLET ORAL at 20:32

## 2017-06-30 RX ADMIN — Medication 3 MILLILITER(S): at 20:04

## 2017-06-30 RX ADMIN — ATORVASTATIN CALCIUM 10 MILLIGRAM(S): 80 TABLET, FILM COATED ORAL at 21:06

## 2017-06-30 RX ADMIN — Medication 20 MILLIGRAM(S): at 19:36

## 2017-06-30 RX ADMIN — FENTANYL CITRATE 25 MICROGRAM(S): 50 INJECTION INTRAVENOUS at 09:25

## 2017-06-30 RX ADMIN — FENTANYL CITRATE 50 MICROGRAM(S): 50 INJECTION INTRAVENOUS at 02:22

## 2017-06-30 RX ADMIN — Medication 400 MILLIGRAM(S): at 06:51

## 2017-06-30 RX ADMIN — FENTANYL CITRATE 25 MICROGRAM(S): 50 INJECTION INTRAVENOUS at 12:06

## 2017-06-30 RX ADMIN — FENTANYL CITRATE 50 MICROGRAM(S): 50 INJECTION INTRAVENOUS at 04:23

## 2017-06-30 RX ADMIN — Medication 50 MILLIGRAM(S): at 15:08

## 2017-06-30 RX ADMIN — FENTANYL CITRATE 50 MICROGRAM(S): 50 INJECTION INTRAVENOUS at 06:06

## 2017-06-30 RX ADMIN — AMIODARONE HYDROCHLORIDE 400 MILLIGRAM(S): 400 TABLET ORAL at 23:36

## 2017-06-30 RX ADMIN — Medication 50 MILLIGRAM(S): at 06:16

## 2017-06-30 RX ADMIN — Medication 50 MILLIGRAM(S): at 23:10

## 2017-06-30 RX ADMIN — MILRINONE LACTATE 7.65 MICROGRAM(S)/KG/MIN: 1 INJECTION, SOLUTION INTRAVENOUS at 01:39

## 2017-06-30 RX ADMIN — Medication 10.2 MICROGRAM(S)/KG/MIN: at 19:37

## 2017-06-30 RX ADMIN — FENTANYL CITRATE 25 MICROGRAM(S): 50 INJECTION INTRAVENOUS at 12:21

## 2017-06-30 RX ADMIN — Medication 250 MILLIGRAM(S): at 21:06

## 2017-06-30 RX ADMIN — PANTOPRAZOLE SODIUM 40 MILLIGRAM(S): 20 TABLET, DELAYED RELEASE ORAL at 11:43

## 2017-06-30 RX ADMIN — FENTANYL CITRATE 25 MICROGRAM(S): 50 INJECTION INTRAVENOUS at 07:40

## 2017-06-30 RX ADMIN — Medication 125 MILLILITER(S): at 17:27

## 2017-06-30 RX ADMIN — Medication 250 MILLIGRAM(S): at 09:28

## 2017-06-30 NOTE — PROGRESS NOTE ADULT - SUBJECTIVE AND OBJECTIVE BOX
[facility], [facility]                                   [address] [place] [address]                                                      PHONE: [phone]                                                         FAX: [phone]  -------------------------------------------------------------------------------------------------------------------------------    Pt seen and examined. FU for MVR/TVR    Overnight events/patient complaints: Pt without complains. Extubated this AM. Remains on IABP and pressor support    Vital Signs Last 24 Hrs  T(C): 37 (30 Jun 2017 08:00), Max: 37.4 (30 Jun 2017 02:00)  T(F): 98.6 (30 Jun 2017 08:00), Max: 99.3 (30 Jun 2017 02:00)  HR: 112 (30 Jun 2017 08:00) (77 - 112)  BP: --  BP(mean): --  RR: 14 (30 Jun 2017 08:00) (5 - 21)  SpO2: 93% (30 Jun 2017 08:00) (80% - 100%)  I&O's Summary    29 Jun 2017 07:01  -  30 Jun 2017 07:00  --------------------------------------------------------  IN: 3032 mL / OUT: 1025 mL / NET: 2007 mL    30 Jun 2017 07:01  -  30 Jun 2017 09:31  --------------------------------------------------------  IN: 53.9 mL / OUT: 50 mL / NET: 3.9 mL        PHYSICAL EXAM:  Constitutional: Oriented to time, place and person. Appears well developed, well nourished; alert and co-operative  HEENT:     Conjunctiva normal, Normal oral mucosa, No JVD	  Cardiovascular: S1, S2 irregular  Respiratory: Lungs clear to auscultation; no crepitations, no wheeze  Gastrointestinal:  Soft, Non-tender, + BS	  Extremities: No cyanosis, clubbing or edema  Skin: Warm and dry  Neurologic: Alert oriented to time place and person  Psychiatric: affect was normal        LABS:                        10.7   8.4   )-----------( 171      ( 30 Jun 2017 03:24 )             32.0     06-30    139  |  104  |  28.0<H>  ----------------------------<  136<H>  4.6   |  18.0<L>  |  1.51<H>    Ca    7.6<L>      30 Jun 2017 03:24  Phos  2.5     06-30  Mg     3.2     06-30    TPro  5.1<L>  /  Alb  3.4  /  TBili  0.9  /  DBili  x   /  AST  58<H>  /  ALT  19  /  AlkPhos  28<L>  06-30    CARDIAC MARKERS ( 30 Jun 2017 03:24 )  x     / 0.89 ng/mL / 348 U/L / x     / 33.8 ng/mL  CARDIAC MARKERS ( 29 Jun 2017 14:37 )  x     / 1.29 ng/mL / 346 U/L / x     / 45.5 ng/mL      PT/INR - ( 30 Jun 2017 03:24 )   PT: 14.0 sec;   INR: 1.27 ratio         PTT - ( 30 Jun 2017 03:24 )  PTT:25.9 sec    RADIOLOGY & ADDITIONAL STUDIES:    CARDIOLOGY TESTING:     Telemetry: AF with elevated HR    Echocardiogram:  < from: [facility] Echo Doppler (04.04.17 @ 08:29) >   1. Left ventricular ejection fraction, by visual estimation, is 40 to   45%.   2. Elevated mean left atrial pressure.   3. The left ventricular diastolic function could not be assessed in this   study.   4. S/p Lariat without visible appendage or thrombus.   5. Severely dilated right atrium.   6. Severe mitral valve regurgitation.   7. Thickening of the anterior and posterior mitral valve leaflets.   8. Severe MR by color flow mapping, PISA ERO 0.36 cm2 and vena contracta   0.7 cm.   9. Severetricuspid regurgitation.  10. Small patent foramen ovale, with bidirectional shunting across atrial   septum.  11. Color Doppler demonstrates the presence of a shunt at the Atrial   level.  12. Narrow tunneled PFO with bidirectional shunting on colorflow,   confirmed by contrast and 3D imaging.  13. Severely enlarged left atrium.    < end of copied text >    Cardiac Catheterization:  < from: Cardiac Cath Lab - Adult (04.14.17 @ 09:16) >  VENTRICLES: EF 50% w/ severe MR.  CORONARY VESSELS: R dominant.  Minimal LAD/RCA disease.  COMPLICATIONS: No complications occurred during the cath lab visit.  DIAGNOSTIC IMPRESSIONS: Moderate pulmonary HTN.  Preserved EF w/ severe MR.  No significant CAD.  Hx back injury/has a spinal stimulator.  DIAGNOSTIC RECOMMENDATIONS: Outpt evaluation for mitraclip.  INTERVENTIONAL IMPRESSIONS: Moderate pulmonary HTN.  Preserved EF w/ severe MR.  No significant CAD.  Hx back injury/has a spinal stimulator.  INTERVENTIONAL RECOMMENDATIONS: Outpt evaluation for mitraclip.    < end of copied text >      MEDICATIONS:  MEDICATIONS  (STANDING):  sodium chloride 0.9% lock flush 3 milliLiter(s) IV Push every 8 hours  sodium chloride 0.9%. 1000 milliLiter(s) (5 mL/Hr) IV Continuous <Continuous>  sodium chloride 0.9%. 1000 milliLiter(s) (10 mL/Hr) IV Continuous <Continuous>  pantoprazole  Injectable 40 milliGRAM(s) IV Push daily  docusate sodium 100 milliGRAM(s) Oral three times a day  insulin Infusion 1 Unit(s)/Hr (1 mL/Hr) IV Continuous <Continuous>  milrinone Infusion 0.375 MICROgram(s)/kG/Min (7.65 mL/Hr) IV Continuous <Continuous>  vancomycin  IVPB 1000 milliGRAM(s) IV Intermittent every 12 hours  aztreonam  IVPB 1000 milliGRAM(s) IV Intermittent every 8 hours  DOBUTamine Infusion 5 MICROgram(s)/kG/Min (10.2 mL/Hr) IV Continuous <Continuous>  EPINEPHRINE 8 milliGRAM(s),NS 0.9% 250 milliLiter(s) 8 milliGRAM(s) (22.5 mL/Hr) IV Continuous <Continuous>  vasopressin Infusion 0.05 Unit(s)/Min (3 mL/Hr) IV Continuous <Continuous>  levothyroxine Injectable 50 MICROGram(s) IV Push daily  norepinephrine Infusion 0.008 MICROgram(s)/kG/Min (1 mL/Hr) IV Continuous <Continuous>  ALBUTerol/ipratropium for Nebulization 3 milliLiter(s) Nebulizer every 6 hours  DULoxetine 60 milliGRAM(s) Oral daily  atorvastatin 10 milliGRAM(s) Oral at bedtime  tiotropium 18 MICROgram(s) Capsule 1 Capsule(s) Inhalation daily  aspirin enteric coated 325 milliGRAM(s) Oral daily    MEDICATIONS  (PRN):      ASSESSMENT AND PLAN:    71y Female with prior history of NICM, CHF, s/p AICD, afib, anemia(baseline 9), hx gastric bypass, gi bleeding,  scleroderma, OA, hypothyroidism, GERD, s/p Mitral valve repair, tricuspid valve repair requiring intraop IABP for RV failure.    -  on IABP and pressors to be weaned as tolerated  -  MX as per CTS [facility]                                   [address] [place] [address]                                                      PHONE: [phone]                                                         FAX: [phone]  -------------------------------------------------------------------------------------------------------------------------------    Pt seen and examined. FU for MVR/TVR    Overnight events/patient complaints: Pt without complains. Extubated this AM. Remains on IABP and pressor support    Vital Signs Last 24 Hrs  T(C): 37 (30 Jun 2017 08:00), Max: 37.4 (30 Jun 2017 02:00)  T(F): 98.6 (30 Jun 2017 08:00), Max: 99.3 (30 Jun 2017 02:00)  HR: 112 (30 Jun 2017 08:00) (77 - 112)  BP: --  BP(mean): --  RR: 14 (30 Jun 2017 08:00) (5 - 21)  SpO2: 93% (30 Jun 2017 08:00) (80% - 100%)  I&O's Summary    29 Jun 2017 07:01  -  30 Jun 2017 07:00  --------------------------------------------------------  IN: 3032 mL / OUT: 1025 mL / NET: 2007 mL    30 Jun 2017 07:01  -  30 Jun 2017 09:31  --------------------------------------------------------  IN: 53.9 mL / OUT: 50 mL / NET: 3.9 mL        PHYSICAL EXAM:  Constitutional: Oriented to time, place and person. Appears well developed, well nourished; alert and co-operative  HEENT:     Conjunctiva normal, Normal oral mucosa, No JVD	  Cardiovascular: S1, S2 irregular  Respiratory: Lungs clear to auscultation; no crepitations, no wheeze  Gastrointestinal:  Soft, Non-tender, + BS	  Extremities: No cyanosis, clubbing or edema  Skin: Warm and dry  Neurologic: Alert oriented to time place and person  Psychiatric: affect was normal        LABS:                        10.7   8.4   )-----------( 171      ( 30 Jun 2017 03:24 )             32.0     06-30    139  |  104  |  28.0<H>  ----------------------------<  136<H>  4.6   |  18.0<L>  |  1.51<H>    Ca    7.6<L>      30 Jun 2017 03:24  Phos  2.5     06-30  Mg     3.2     06-30    TPro  5.1<L>  /  Alb  3.4  /  TBili  0.9  /  DBili  x   /  AST  58<H>  /  ALT  19  /  AlkPhos  28<L>  06-30    CARDIAC MARKERS ( 30 Jun 2017 03:24 )  x     / 0.89 ng/mL / 348 U/L / x     / 33.8 ng/mL  CARDIAC MARKERS ( 29 Jun 2017 14:37 )  x     / 1.29 ng/mL / 346 U/L / x     / 45.5 ng/mL      PT/INR - ( 30 Jun 2017 03:24 )   PT: 14.0 sec;   INR: 1.27 ratio         PTT - ( 30 Jun 2017 03:24 )  PTT:25.9 sec    RADIOLOGY & ADDITIONAL STUDIES:    CARDIOLOGY TESTING:     Telemetry: AF with elevated HR    Echocardiogram:  < from: [facility] Echo Doppler (04.04.17 @ 08:29) >   1. Left ventricular ejection fraction, by visual estimation, is 40 to   45%.   2. Elevated mean left atrial pressure.   3. The left ventricular diastolic function could not be assessed in this   study.   4. S/p Lariat without visible appendage or thrombus.   5. Severely dilated right atrium.   6. Severe mitral valve regurgitation.   7. Thickening of the anterior and posterior mitral valve leaflets.   8. Severe MR by color flow mapping, PISA ERO 0.36 cm2 and vena contracta   0.7 cm.   9. Severetricuspid regurgitation.  10. Small patent foramen ovale, with bidirectional shunting across atrial   septum.  11. Color Doppler demonstrates the presence of a shunt at the Atrial   level.  12. Narrow tunneled PFO with bidirectional shunting on colorflow,   confirmed by contrast and 3D imaging.  13. Severely enlarged left atrium.    < end of copied text >    Cardiac Catheterization:  < from: Cardiac Cath Lab - Adult (04.14.17 @ 09:16) >  VENTRICLES: EF 50% w/ severe MR.  CORONARY VESSELS: R dominant.  Minimal LAD/RCA disease.  COMPLICATIONS: No complications occurred during the cath lab visit.  DIAGNOSTIC IMPRESSIONS: Moderate pulmonary HTN.  Preserved EF w/ severe MR.  No significant CAD.  Hx back injury/has a spinal stimulator.  DIAGNOSTIC RECOMMENDATIONS: Outpt evaluation for mitraclip.  INTERVENTIONAL IMPRESSIONS: Moderate pulmonary HTN.  Preserved EF w/ severe MR.  No significant CAD.  Hx back injury/has a spinal stimulator.  INTERVENTIONAL RECOMMENDATIONS: Outpt evaluation for mitraclip.    < end of copied text >      MEDICATIONS:  MEDICATIONS  (STANDING):  sodium chloride 0.9% lock flush 3 milliLiter(s) IV Push every 8 hours  sodium chloride 0.9%. 1000 milliLiter(s) (5 mL/Hr) IV Continuous <Continuous>  sodium chloride 0.9%. 1000 milliLiter(s) (10 mL/Hr) IV Continuous <Continuous>  pantoprazole  Injectable 40 milliGRAM(s) IV Push daily  docusate sodium 100 milliGRAM(s) Oral three times a day  insulin Infusion 1 Unit(s)/Hr (1 mL/Hr) IV Continuous <Continuous>  milrinone Infusion 0.375 MICROgram(s)/kG/Min (7.65 mL/Hr) IV Continuous <Continuous>  vancomycin  IVPB 1000 milliGRAM(s) IV Intermittent every 12 hours  aztreonam  IVPB 1000 milliGRAM(s) IV Intermittent every 8 hours  DOBUTamine Infusion 5 MICROgram(s)/kG/Min (10.2 mL/Hr) IV Continuous <Continuous>  EPINEPHRINE 8 milliGRAM(s),NS 0.9% 250 milliLiter(s) 8 milliGRAM(s) (22.5 mL/Hr) IV Continuous <Continuous>  vasopressin Infusion 0.05 Unit(s)/Min (3 mL/Hr) IV Continuous <Continuous>  levothyroxine Injectable 50 MICROGram(s) IV Push daily  norepinephrine Infusion 0.008 MICROgram(s)/kG/Min (1 mL/Hr) IV Continuous <Continuous>  ALBUTerol/ipratropium for Nebulization 3 milliLiter(s) Nebulizer every 6 hours  DULoxetine 60 milliGRAM(s) Oral daily  atorvastatin 10 milliGRAM(s) Oral at bedtime  tiotropium 18 MICROgram(s) Capsule 1 Capsule(s) Inhalation daily  aspirin enteric coated 325 milliGRAM(s) Oral daily    MEDICATIONS  (PRN):      ASSESSMENT AND PLAN:    71y Female with prior history of NICM, CHF, s/p AICD, afib, anemia(baseline 9), hx gastric bypass, gi bleeding,  scleroderma, OA, hypothyroidism, GERD, s/p Mitral valve repair, tricuspid valve repair requiring intraop IABP for RV failure.    -  on IABP and pressors to be weaned as tolerated  -  MX as per CTS  -  s/p Lariat in the past

## 2017-06-30 NOTE — PROGRESS NOTE ADULT - SUBJECTIVE AND OBJECTIVE BOX
Cardiac Anesthesiology Post Op note  Alert  Extubated  Chronic Back pain  Still has IABP.  On LEVO,EPI,VASO,PRIMACOR,and Dobutamine  Imp  No Anesthesia related complications at this time

## 2017-06-30 NOTE — PROGRESS NOTE ADULT - SUBJECTIVE AND OBJECTIVE BOX
Brief summary:  71 year old Female with PMH NICM, CHF, s/p AICD, afib, anemia(baseline 9), hx gastric bypass, gi bleeding,  scleroderma, OA, hypothyroidism, GERD. Patient originally presented with complaints of pedal edema, weight gain, SOB with exertion. Patient now POD #1 Mitral valve repair, tricuspid valve repair requiring intraop IABP for RV failure. Post op, patient requiring several gtts (Epinephrine, Primacor, Dobutamine, Levophed, Vasopressin) for cardiogenic shock. Patient remains intubated and sedated.    Overnight events:  Patient hemodynamically stable on above states gtts.    Past Medical History:  Rheumatic tricuspid valve regurgitation  H/o or current diagnosis of HF- ACEI/ARB contraindication unknown  H/o or current diagnosis of HF- Contraindication to ACEI/ARBs  H/o or current diagnosis of HF- Contraindication to ACEI/ARBs  H/o or current diagnosis of HF- ACEI/ARB contraindication unknown  H/o or current diagnosis of HF- Contraindication to ACEI/ARBs  Family history of heart valve abnormality (Child)  Family history of kidney cancer  Family history of heart disease (Child, Sibling)  Handoff  B12 deficiency anemia  COPD (chronic obstructive pulmonary disease)  Arthritis  Ejection fraction < 50%  CHF (congestive heart failure)  MR (mitral regurgitation)  Anemia  Ventricular tachycardia  Cardiomyopathy  Scleroderma  Chronic back pain  Sleep apnea  Raynaud's disease  GI bleed not requiring more than 4 units of blood in 24 hours, ICU, or surgery  Hypothyroid  Afib  MI (myocardial infarction)  HLD (hyperlipidemia)  HTN (hypertension)  Tricuspid valve insufficiency, unspecified etiology  Severe mitral regurgitation  Tricuspid valve insufficiency, unspecified etiology  Severe mitral regurgitation  Non-rheumatic mitral regurgitation  HTN (hypertension)  Afib  Nonrheumatic mitral valve insufficiency  Mitral valve repair  Tricuspid valve repair  Abnormality of left atrial appendage  History of breast surgery  Spinal cord stimulator status  AICD (automatic cardioverter/defibrillator) present  Hernia  Bariatric surgery status  RHEUMATIC TRICUSPID INSUFFICIE        sodium chloride 0.9% lock flush 3 milliLiter(s) IV Push every 8 hours  sodium chloride 0.9%. 1000 milliLiter(s) IV Continuous <Continuous>  sodium chloride 0.9%. 1000 milliLiter(s) IV Continuous <Continuous>  albumin human  5% IVPB 250 milliLiter(s) IV Intermittent once  chlorhexidine 0.12% Liquid 5 milliLiter(s) Swish and Spit every 4 hours  pantoprazole  Injectable 40 milliGRAM(s) IV Push daily  docusate sodium 100 milliGRAM(s) Oral three times a day  potassium chloride  10 mEq/50 mL IVPB 10 milliEquivalent(s) IV Intermittent every 1 hour  potassium chloride  10 mEq/50 mL IVPB 10 milliEquivalent(s) IV Intermittent every 1 hour  potassium chloride  10 mEq/50 mL IVPB 10 milliEquivalent(s) IV Intermittent once  insulin Infusion 1 Unit(s)/Hr IV Continuous <Continuous>  milrinone Infusion 0.375 MICROgram(s)/kG/Min IV Continuous <Continuous>  vancomycin  IVPB 1000 milliGRAM(s) IV Intermittent every 12 hours  aztreonam  IVPB 1000 milliGRAM(s) IV Intermittent every 8 hours  DOBUTamine Infusion 5 MICROgram(s)/kG/Min IV Continuous <Continuous>  EPINEPHRINE 8 milliGRAM(s),NS 0.9% 250 milliLiter(s) 8 milliGRAM(s) IV Continuous <Continuous>  vasopressin Infusion 0.05 Unit(s)/Min IV Continuous <Continuous>  levothyroxine Injectable 50 MICROGram(s) IV Push daily  dexamethasone  Injectable 4 milliGRAM(s) IV Push every 8 hours  propofol Infusion 5 MICROgram(s)/kG/Min IV Continuous <Continuous>  MEDICATIONS  (PRN):    Height (cm): 154.94 (06-29 @ 06:37)  Weight (kg): 68 (06-29 @ 06:37)  BMI (kg/m2): 28.3 (06-29 @ 06:37)  BSA (m2): 1.67 (06-29 @ 06:37)Mode: AC/ CMV (Assist Control/ Continuous Mandatory Ventilation), RR (machine): 14, TV (machine): 550, FiO2: 40, PEEP: 5, MAP: 9, PIP: 25  Daily Height in cm: 154.94 (29 Jun 2017 06:37)    Daily       ABG - ( 29 Jun 2017 23:19 )  pH: 7.41  /  pCO2: 34    /  pO2: 138   / HCO3: 22    / Base Excess: -2.8  /  SaO2: 99                                      9.3    13.6  )-----------( 268      ( 29 Jun 2017 14:37 )             28.8   06-29    140  |  105  |  23.0<H>  ----------------------------<  171<H>  4.2   |  18.0<L>  |  1.20    Ca    8.3<L>      29 Jun 2017 14:37  Phos  3.2     06-29  Mg     2.8     06-29    TPro  5.1<L>  /  Alb  3.2<L>  /  TBili  1.3  /  DBili  x   /  AST  47<H>  /  ALT  16  /  AlkPhos  30<L>  06-29    CARDIAC MARKERS ( 29 Jun 2017 14:37 )  x     / 1.29 ng/mL / 346 U/L / x     / 45.5 ng/mL    PT/INR - ( 29 Jun 2017 14:37 )   PT: 15.4 sec;   INR: 1.39 ratio         PTT - ( 29 Jun 2017 14:37 )  PTT:35.9 sec      Objective:  T(C): 37 (06-30-17 @ 01:00), Max: 37 (06-30-17 @ 01:00)  HR: 104 (06-30-17 @ 01:00) (77 - 109)  BP: 102/60 (06-29-17 @ 06:37) (102/60 - 102/60)  RR: 14 (06-30-17 @ 01:00) (3 - 16)  SpO2: 98% (06-30-17 @ 01:00) (95% - 100%)  Wt(kg): --CAPILLARY BLOOD GLUCOSE  143 (30 Jun 2017 01:00)  183 (30 Jun 2017 00:00)  195 (29 Jun 2017 22:00)  214 (29 Jun 2017 21:00)  248 (29 Jun 2017 20:00)  219 (29 Jun 2017 19:00)  233 (29 Jun 2017 18:00)  141 (29 Jun 2017 17:00)  210 (29 Jun 2017 16:00)  171 (29 Jun 2017 15:00)      I&O's Summary    29 Jun 2017 07:01  -  30 Jun 2017 01:30  --------------------------------------------------------  IN: 2666.3 mL / OUT: 830 mL / NET: 1836.3 mL        Physical Exam  Neuro: A+O x 3, non-focal, speech clear and intact  Pulm: CTA, equal bilaterally  CV: RRR with PVCs, +S1S2, no murmurs (+) AICD  Abd: soft, NT, ND, +BS  Ext: +DP Pulses b/l, +PT pulses, no edema  Inc: MSI C/D/I/stable w/ dsg,         Plan to be discussed / reviewed with attending surgeons during AM rounds.

## 2017-06-30 NOTE — PROGRESS NOTE ADULT - ASSESSMENT
71 year old Female with PMH NICM, CHF, s/p AICD, afib, anemia(baseline 9), hx gastric bypass, gi bleeding,  scleroderma, OA, hypothyroidism, GERD. Patient originally presented with complaints of pedal edema, weight gain, SOB with exertion. Patient now POD #1 Mitral valve repair, tricuspid valve repair requiring intraop IABP for RV failure. Post op, patient requiring several gtts (Epinephrine, Primacor, Dobutamine, Levophed, Vasopressin) for cardiogenic shock.   POD#0: Patient remained intubated and sedated, titrating of gtts for goal MAP 70-80, IABP 1:1, metabolic acidosis and lactemia, treated with sodium bicarb  POD#1: Continue titrating gtts as tolerated, extubate

## 2017-07-01 LAB
ANION GAP SERPL CALC-SCNC: 14 MMOL/L — SIGNIFICANT CHANGE UP (ref 5–17)
BUN SERPL-MCNC: 35 MG/DL — HIGH (ref 8–20)
CALCIUM SERPL-MCNC: 7.1 MG/DL — LOW (ref 8.6–10.2)
CHLORIDE SERPL-SCNC: 103 MMOL/L — SIGNIFICANT CHANGE UP (ref 98–107)
CO2 SERPL-SCNC: 18 MMOL/L — LOW (ref 22–29)
CREAT SERPL-MCNC: 1.55 MG/DL — HIGH (ref 0.5–1.3)
GAS PNL BLDA: SIGNIFICANT CHANGE UP
GLUCOSE SERPL-MCNC: 144 MG/DL — HIGH (ref 70–115)
HCT VFR BLD CALC: 29 % — LOW (ref 37–47)
HGB BLD-MCNC: 9.7 G/DL — LOW (ref 12–16)
MAGNESIUM SERPL-MCNC: 2.7 MG/DL — HIGH (ref 1.6–2.6)
MCHC RBC-ENTMCNC: 27 PG — SIGNIFICANT CHANGE UP (ref 27–31)
MCHC RBC-ENTMCNC: 33.4 G/DL — SIGNIFICANT CHANGE UP (ref 32–36)
MCV RBC AUTO: 80.8 FL — LOW (ref 81–99)
PLATELET # BLD AUTO: 141 K/UL — LOW (ref 150–400)
POTASSIUM SERPL-MCNC: 4.5 MMOL/L — SIGNIFICANT CHANGE UP (ref 3.5–5.3)
POTASSIUM SERPL-SCNC: 4.5 MMOL/L — SIGNIFICANT CHANGE UP (ref 3.5–5.3)
RBC # BLD: 3.59 M/UL — LOW (ref 4.4–5.2)
RBC # FLD: 17.9 % — HIGH (ref 11–15.6)
SODIUM SERPL-SCNC: 135 MMOL/L — SIGNIFICANT CHANGE UP (ref 135–145)
WBC # BLD: 14.3 K/UL — HIGH (ref 4.8–10.8)
WBC # FLD AUTO: 14.3 K/UL — HIGH (ref 4.8–10.8)

## 2017-07-01 PROCEDURE — 71010: CPT | Mod: 26

## 2017-07-01 PROCEDURE — 93010 ELECTROCARDIOGRAM REPORT: CPT

## 2017-07-01 RX ORDER — AZTREONAM 2 G
1000 VIAL (EA) INJECTION ONCE
Qty: 0 | Refills: 0 | Status: COMPLETED | OUTPATIENT
Start: 2017-07-01 | End: 2017-07-01

## 2017-07-01 RX ORDER — MAGNESIUM SULFATE 500 MG/ML
1 VIAL (ML) INJECTION ONCE
Qty: 0 | Refills: 0 | Status: COMPLETED | OUTPATIENT
Start: 2017-07-01 | End: 2017-07-01

## 2017-07-01 RX ORDER — FUROSEMIDE 40 MG
40 TABLET ORAL ONCE
Qty: 0 | Refills: 0 | Status: COMPLETED | OUTPATIENT
Start: 2017-07-01 | End: 2017-07-01

## 2017-07-01 RX ORDER — DIGOXIN 250 MCG
0.5 TABLET ORAL ONCE
Qty: 0 | Refills: 0 | Status: DISCONTINUED | OUTPATIENT
Start: 2017-07-01 | End: 2017-07-01

## 2017-07-01 RX ORDER — DIGOXIN 250 MCG
0.25 TABLET ORAL ONCE
Qty: 0 | Refills: 0 | Status: COMPLETED | OUTPATIENT
Start: 2017-07-01 | End: 2017-07-01

## 2017-07-01 RX ORDER — DIGOXIN 250 MCG
0.5 TABLET ORAL ONCE
Qty: 0 | Refills: 0 | Status: COMPLETED | OUTPATIENT
Start: 2017-07-01 | End: 2017-07-01

## 2017-07-01 RX ORDER — LEVOTHYROXINE SODIUM 125 MCG
100 TABLET ORAL DAILY
Qty: 0 | Refills: 0 | Status: DISCONTINUED | OUTPATIENT
Start: 2017-07-01 | End: 2017-07-08

## 2017-07-01 RX ORDER — PANTOPRAZOLE SODIUM 20 MG/1
40 TABLET, DELAYED RELEASE ORAL
Qty: 0 | Refills: 0 | Status: DISCONTINUED | OUTPATIENT
Start: 2017-07-01 | End: 2017-07-08

## 2017-07-01 RX ORDER — AZTREONAM 2 G
1000 VIAL (EA) INJECTION ONCE
Qty: 0 | Refills: 0 | Status: DISCONTINUED | OUTPATIENT
Start: 2017-07-01 | End: 2017-07-01

## 2017-07-01 RX ORDER — AMIODARONE HYDROCHLORIDE 400 MG/1
150 TABLET ORAL ONCE
Qty: 0 | Refills: 0 | Status: COMPLETED | OUTPATIENT
Start: 2017-07-01 | End: 2017-07-01

## 2017-07-01 RX ORDER — POTASSIUM CHLORIDE 20 MEQ
40 PACKET (EA) ORAL ONCE
Qty: 0 | Refills: 0 | Status: COMPLETED | OUTPATIENT
Start: 2017-07-01 | End: 2017-07-01

## 2017-07-01 RX ORDER — DIGOXIN 250 MCG
0.12 TABLET ORAL EVERY OTHER DAY
Qty: 0 | Refills: 0 | Status: DISCONTINUED | OUTPATIENT
Start: 2017-07-02 | End: 2017-07-05

## 2017-07-01 RX ADMIN — Medication 40 MILLIGRAM(S): at 18:36

## 2017-07-01 RX ADMIN — OXYCODONE HYDROCHLORIDE 10 MILLIGRAM(S): 5 TABLET ORAL at 07:59

## 2017-07-01 RX ADMIN — TIOTROPIUM BROMIDE 1 CAPSULE(S): 18 CAPSULE ORAL; RESPIRATORY (INHALATION) at 08:19

## 2017-07-01 RX ADMIN — SODIUM CHLORIDE 3 MILLILITER(S): 9 INJECTION INTRAMUSCULAR; INTRAVENOUS; SUBCUTANEOUS at 05:36

## 2017-07-01 RX ADMIN — Medication 50 MICROGRAM(S): at 05:41

## 2017-07-01 RX ADMIN — ATORVASTATIN CALCIUM 10 MILLIGRAM(S): 80 TABLET, FILM COATED ORAL at 21:07

## 2017-07-01 RX ADMIN — DULOXETINE HYDROCHLORIDE 60 MILLIGRAM(S): 30 CAPSULE, DELAYED RELEASE ORAL at 12:25

## 2017-07-01 RX ADMIN — Medication 250 MILLIGRAM(S): at 09:14

## 2017-07-01 RX ADMIN — OXYCODONE HYDROCHLORIDE 10 MILLIGRAM(S): 5 TABLET ORAL at 05:00

## 2017-07-01 RX ADMIN — OXYCODONE HYDROCHLORIDE 10 MILLIGRAM(S): 5 TABLET ORAL at 09:00

## 2017-07-01 RX ADMIN — Medication 50 MILLIGRAM(S): at 10:09

## 2017-07-01 RX ADMIN — Medication 3 MILLILITER(S): at 08:18

## 2017-07-01 RX ADMIN — Medication 40 MILLIEQUIVALENT(S): at 19:32

## 2017-07-01 RX ADMIN — OXYCODONE HYDROCHLORIDE 10 MILLIGRAM(S): 5 TABLET ORAL at 04:10

## 2017-07-01 RX ADMIN — Medication 100 MILLIGRAM(S): at 05:42

## 2017-07-01 RX ADMIN — Medication 2 UNIT(S): at 11:55

## 2017-07-01 RX ADMIN — AMIODARONE HYDROCHLORIDE 400 MILLIGRAM(S): 400 TABLET ORAL at 21:07

## 2017-07-01 RX ADMIN — Medication 40 MILLIGRAM(S): at 00:25

## 2017-07-01 RX ADMIN — Medication 3 MILLILITER(S): at 03:37

## 2017-07-01 RX ADMIN — SODIUM CHLORIDE 3 MILLILITER(S): 9 INJECTION INTRAMUSCULAR; INTRAVENOUS; SUBCUTANEOUS at 21:06

## 2017-07-01 RX ADMIN — INSULIN HUMAN 1 UNIT(S)/HR: 100 INJECTION, SOLUTION SUBCUTANEOUS at 05:42

## 2017-07-01 RX ADMIN — Medication 2 UNIT(S): at 16:39

## 2017-07-01 RX ADMIN — Medication 3 MILLILITER(S): at 15:07

## 2017-07-01 RX ADMIN — Medication 0.5 MILLIGRAM(S): at 06:48

## 2017-07-01 RX ADMIN — Medication 100 MILLIGRAM(S): at 21:07

## 2017-07-01 RX ADMIN — Medication 50 MILLIGRAM(S): at 06:07

## 2017-07-01 RX ADMIN — Medication 0.25 MILLIGRAM(S): at 11:58

## 2017-07-01 RX ADMIN — Medication 100 MILLIGRAM(S): at 15:54

## 2017-07-01 RX ADMIN — Medication 100 GRAM(S): at 10:19

## 2017-07-01 RX ADMIN — Medication 325 MILLIGRAM(S): at 12:56

## 2017-07-01 RX ADMIN — AMIODARONE HYDROCHLORIDE 400 MILLIGRAM(S): 400 TABLET ORAL at 15:53

## 2017-07-01 RX ADMIN — Medication 40 MILLIGRAM(S): at 08:10

## 2017-07-01 RX ADMIN — Medication 3 MILLILITER(S): at 20:17

## 2017-07-01 RX ADMIN — SODIUM CHLORIDE 3 MILLILITER(S): 9 INJECTION INTRAMUSCULAR; INTRAVENOUS; SUBCUTANEOUS at 15:54

## 2017-07-01 RX ADMIN — AMIODARONE HYDROCHLORIDE 618 MILLIGRAM(S): 400 TABLET ORAL at 09:40

## 2017-07-01 RX ADMIN — Medication 2 UNIT(S): at 08:10

## 2017-07-01 RX ADMIN — AMIODARONE HYDROCHLORIDE 400 MILLIGRAM(S): 400 TABLET ORAL at 05:41

## 2017-07-01 NOTE — PROGRESS NOTE ADULT - ASSESSMENT
71 year old Female with PMH NICM, CHF, s/p AICD, afib, anemia(baseline 9), hx gastric bypass, gi bleeding,  scleroderma, OA, hypothyroidism, GERD. Patient originally presented with complaints of pedal edema, weight gain, SOB with exertion. Patient now POD #2 Mitral valve repair, tricuspid valve repair requiring intraop IABP for biventricular failure/cardiogenic shock.  POD#0: Patient remained intubated and sedated, titrating of gtts for goal MAP 70-80, IABP 1:1, metabolic acidosis and lactemia, treated with sodium bicarb  POD#1: Continue titrating gtts as tolerated, extubated  POD#2: MARTHA req amio bolus w/ PO amio load,

## 2017-07-01 NOTE — PROGRESS NOTE ADULT - SUBJECTIVE AND OBJECTIVE BOX
Subjective:  71yFemale POD#2  MV repair/TV repair w/ intraop IABP  Postop biventricular failure requiring mutiple iontropes and pressor support    Overnight MARTHA to 130s requiring amio bolus and PO amio load    "im excited to get IABP out of my leg"  denies sob, chest pressure  No overnight events    Past Medical History:  Rheumatic tricuspid valve regurgitation  H/o or current diagnosis of HF- ACEI/ARB contraindication unknown  H/o or current diagnosis of HF- Contraindication to ACEI/ARBs  H/o or current diagnosis of HF- Contraindication to ACEI/ARBs  H/o or current diagnosis of HF- ACEI/ARB contraindication unknown  H/o or current diagnosis of HF- Contraindication to ACEI/ARBs  Family history of heart valve abnormality (Child)  Family history of kidney cancer  Family history of heart disease (Child, Sibling)  Handoff  B12 deficiency anemia  COPD (chronic obstructive pulmonary disease)  Arthritis  Ejection fraction < 50%  CHF (congestive heart failure)  MR (mitral regurgitation)  Anemia  Ventricular tachycardia  Cardiomyopathy  Scleroderma  Chronic back pain  Sleep apnea  Raynaud's disease  GI bleed not requiring more than 4 units of blood in 24 hours, ICU, or surgery  Hypothyroid  Afib  MI (myocardial infarction)  HLD (hyperlipidemia)  HTN (hypertension)  Tricuspid valve insufficiency, unspecified etiology  Severe mitral regurgitation  Tricuspid valve insufficiency, unspecified etiology  Severe mitral regurgitation  Non-rheumatic mitral regurgitation  Metabolic acidosis  Hypothyroidism, unspecified type  Anemia, chronic disease  Simple chronic bronchitis  Essential hypertension  Non-rheumatic tricuspid valve insufficiency  Non-rheumatic mitral regurgitation  Cardiogenic shock  Chronic atrial fibrillation  Acute on chronic systolic heart failure  HTN (hypertension)  Afib  Nonrheumatic mitral valve insufficiency  Mitral valve repair  Tricuspid valve repair  Abnormality of left atrial appendage  History of breast surgery  Spinal cord stimulator status  AICD (automatic cardioverter/defibrillator) present  Hernia  Bariatric surgery status  RHEUMATIC TRICUSPID INSUFFICIE        sodium chloride 0.9% lock flush 3 milliLiter(s) IV Push every 8 hours  sodium chloride 0.9%. 1000 milliLiter(s) IV Continuous <Continuous>  sodium chloride 0.9%. 1000 milliLiter(s) IV Continuous <Continuous>  pantoprazole  Injectable 40 milliGRAM(s) IV Push daily  docusate sodium 100 milliGRAM(s) Oral three times a day  insulin Infusion 1 Unit(s)/Hr IV Continuous <Continuous>  milrinone Infusion 0.375 MICROgram(s)/kG/Min IV Continuous <Continuous>  vancomycin  IVPB 1000 milliGRAM(s) IV Intermittent every 12 hours  aztreonam  IVPB 1000 milliGRAM(s) IV Intermittent every 8 hours  DOBUTamine Infusion 5 MICROgram(s)/kG/Min IV Continuous <Continuous>  EPINEPHRINE 8 milliGRAM(s),NS 0.9% 250 milliLiter(s) 8 milliGRAM(s) IV Continuous <Continuous>  vasopressin Infusion 0.05 Unit(s)/Min IV Continuous <Continuous>  levothyroxine Injectable 50 MICROGram(s) IV Push daily  norepinephrine Infusion 0.008 MICROgram(s)/kG/Min IV Continuous <Continuous>  ALBUTerol/ipratropium for Nebulization 3 milliLiter(s) Nebulizer every 6 hours  DULoxetine 60 milliGRAM(s) Oral daily  atorvastatin 10 milliGRAM(s) Oral at bedtime  tiotropium 18 MICROgram(s) Capsule 1 Capsule(s) Inhalation daily  aspirin enteric coated 325 milliGRAM(s) Oral daily  oxyCODONE IR 5 milliGRAM(s) Oral every 4 hours PRN  oxyCODONE IR 10 milliGRAM(s) Oral every 4 hours PRN  insulin lispro Injectable (HumaLOG) 2 Unit(s) SubCutaneous three times a day before meals  amiodarone    Tablet 400 milliGRAM(s) Oral every 8 hours  MEDICATIONS  (PRN):  oxyCODONE IR 5 milliGRAM(s) Oral every 4 hours PRN Mild Pain (1 - 3)  oxyCODONE IR 10 milliGRAM(s) Oral every 4 hours PRN Moderate Pain (4 - 6)    Mode: CPAP with PS, FiO2: 40, PEEP: 5, PS: 10, MAP: 7  Daily     Daily Weight in k.1 (2017 06:00)      ABG - ( 2017 23:27 )  pH: 7.38  /  pCO2: 34    /  pO2: 82    / HCO3: 20    / Base Excess: -4.7  /  SaO2: 97                                      8.8    13.8  )-----------( 142      ( 2017 16:02 )             26.4       142  |  105  |  31.0<H>  ----------------------------<  137<H>  5.6<H>   |  19.0<L>  |  1.37<H>    Ca    7.5<L>      2017 09:55  Phos  2.5     0630  Mg     2.9     0630    TPro  5.1<L>  /  Alb  3.4  /  TBili  0.9  /  DBili  x   /  AST  58<H>  /  ALT  19  /  AlkPhos  28<L>  06-30    CARDIAC MARKERS ( 2017 03:24 )  x     / 0.89 ng/mL / 348 U/L / x     / 33.8 ng/mL  CARDIAC MARKERS ( 2017 14:37 )  x     / 1.29 ng/mL / 346 U/L / x     / 45.5 ng/mL    PT/INR - ( 2017 03:24 )   PT: 14.0 sec;   INR: 1.27 ratio         PTT - ( 2017 03:24 )  PTT:25.9 sec      Objective:  T(C): 37 (17 @ 00:00), Max: 37.4 (17 @ 02:00)  HR: 119 (17 @ 00:00) (82 - 130)  BP: --  RR: 12 (17 @ 00:00) (5 - 21)  SpO2: 94% (17 @ 00:00) (80% - 100%)  Wt(kg): --CAPILLARY BLOOD GLUCOSE  110 (2017 00:00)  110 (2017 23:00)  130 (2017 22:00)  135 (2017 21:00)  137 (2017 20:00)  103 (2017 19:00)  146 (2017 18:00)  145 (2017 17:00)  159 (2017 16:00)  165 (2017 15:00)  172 (2017 14:00)  160 (2017 13:00)  139 (2017 12:00)  136 (2017 11:00)  126 (2017 10:00)  122 (2017 09:00)  114 (2017 08:00)  100 (2017 07:30)  111 (2017 07:00)  135 (2017 06:00)  133 (2017 05:00)  127 (2017 04:00)  139 (2017 03:00)  144 (2017 02:00)  143 (2017 01:00)      I&O's Summary    2017 07:01  -  2017 07:00  --------------------------------------------------------  IN: 3032 mL / OUT: 1025 mL / NET: 2007 mL    2017 07:01  -  2017 00:13  --------------------------------------------------------  IN: 2751.8 mL / OUT: 1145 mL / NET: 1606.8 mL        Physical Exam:  Neuro: a0x3  Pulm: b/l rales,  unlabored resp  CV s1/s2  +iabp  Abd: soft nt/nd  Ext:  R iabp w/o hematoma,  warm well perfused extremities, trace edema  Inc: c/d/i

## 2017-07-02 LAB
ANION GAP SERPL CALC-SCNC: 15 MMOL/L — SIGNIFICANT CHANGE UP (ref 5–17)
BUN SERPL-MCNC: 32 MG/DL — HIGH (ref 8–20)
CALCIUM SERPL-MCNC: 6.7 MG/DL — LOW (ref 8.6–10.2)
CHLORIDE SERPL-SCNC: 97 MMOL/L — LOW (ref 98–107)
CO2 SERPL-SCNC: 19 MMOL/L — LOW (ref 22–29)
CREAT SERPL-MCNC: 1.36 MG/DL — HIGH (ref 0.5–1.3)
GAS PNL BLDA: SIGNIFICANT CHANGE UP
GLUCOSE SERPL-MCNC: 113 MG/DL — SIGNIFICANT CHANGE UP (ref 70–115)
HCT VFR BLD CALC: 27.1 % — LOW (ref 37–47)
HGB BLD-MCNC: 9.1 G/DL — LOW (ref 12–16)
INR BLD: 0.98 RATIO — SIGNIFICANT CHANGE UP (ref 0.88–1.16)
MAGNESIUM SERPL-MCNC: 2.6 MG/DL — SIGNIFICANT CHANGE UP (ref 1.6–2.6)
MCHC RBC-ENTMCNC: 27.5 PG — SIGNIFICANT CHANGE UP (ref 27–31)
MCHC RBC-ENTMCNC: 33.6 G/DL — SIGNIFICANT CHANGE UP (ref 32–36)
MCV RBC AUTO: 81.9 FL — SIGNIFICANT CHANGE UP (ref 81–99)
PHOSPHATE SERPL-MCNC: 3.8 MG/DL — SIGNIFICANT CHANGE UP (ref 2.4–4.7)
PLATELET # BLD AUTO: 117 K/UL — LOW (ref 150–400)
POTASSIUM SERPL-MCNC: 4.2 MMOL/L — SIGNIFICANT CHANGE UP (ref 3.5–5.3)
POTASSIUM SERPL-SCNC: 4.2 MMOL/L — SIGNIFICANT CHANGE UP (ref 3.5–5.3)
PROTHROM AB SERPL-ACNC: 10.8 SEC — SIGNIFICANT CHANGE UP (ref 9.8–12.7)
RBC # BLD: 3.31 M/UL — LOW (ref 4.4–5.2)
RBC # FLD: 18.4 % — HIGH (ref 11–15.6)
SODIUM SERPL-SCNC: 131 MMOL/L — LOW (ref 135–145)
WBC # BLD: 10.6 K/UL — SIGNIFICANT CHANGE UP (ref 4.8–10.8)
WBC # FLD AUTO: 10.6 K/UL — SIGNIFICANT CHANGE UP (ref 4.8–10.8)

## 2017-07-02 PROCEDURE — 71010: CPT | Mod: 26

## 2017-07-02 PROCEDURE — 93010 ELECTROCARDIOGRAM REPORT: CPT

## 2017-07-02 RX ORDER — ENOXAPARIN SODIUM 100 MG/ML
40 INJECTION SUBCUTANEOUS DAILY
Qty: 0 | Refills: 0 | Status: DISCONTINUED | OUTPATIENT
Start: 2017-07-03 | End: 2017-07-08

## 2017-07-02 RX ORDER — BENZOCAINE AND MENTHOL 5; 1 G/100ML; G/100ML
1 LIQUID ORAL
Qty: 0 | Refills: 0 | Status: DISCONTINUED | OUTPATIENT
Start: 2017-07-02 | End: 2017-07-08

## 2017-07-02 RX ORDER — POTASSIUM CHLORIDE 20 MEQ
40 PACKET (EA) ORAL ONCE
Qty: 0 | Refills: 0 | Status: COMPLETED | OUTPATIENT
Start: 2017-07-02 | End: 2017-07-02

## 2017-07-02 RX ORDER — ACETAMINOPHEN 500 MG
650 TABLET ORAL ONCE
Qty: 0 | Refills: 0 | Status: COMPLETED | OUTPATIENT
Start: 2017-07-02 | End: 2017-07-02

## 2017-07-02 RX ORDER — FENTANYL CITRATE 50 UG/ML
50 INJECTION INTRAVENOUS ONCE
Qty: 0 | Refills: 0 | Status: DISCONTINUED | OUTPATIENT
Start: 2017-07-02 | End: 2017-07-02

## 2017-07-02 RX ORDER — ALBUTEROL 90 UG/1
2.5 AEROSOL, METERED ORAL EVERY 6 HOURS
Qty: 0 | Refills: 0 | Status: DISCONTINUED | OUTPATIENT
Start: 2017-07-02 | End: 2017-07-08

## 2017-07-02 RX ORDER — FUROSEMIDE 40 MG
20 TABLET ORAL ONCE
Qty: 0 | Refills: 0 | Status: COMPLETED | OUTPATIENT
Start: 2017-07-02 | End: 2017-07-02

## 2017-07-02 RX ORDER — MAGNESIUM SULFATE 500 MG/ML
2 VIAL (ML) INJECTION ONCE
Qty: 0 | Refills: 0 | Status: DISCONTINUED | OUTPATIENT
Start: 2017-07-02 | End: 2017-07-02

## 2017-07-02 RX ORDER — POTASSIUM CHLORIDE 20 MEQ
40 PACKET (EA) ORAL ONCE
Qty: 0 | Refills: 0 | Status: DISCONTINUED | OUTPATIENT
Start: 2017-07-02 | End: 2017-07-02

## 2017-07-02 RX ORDER — METOPROLOL TARTRATE 50 MG
5 TABLET ORAL ONCE
Qty: 0 | Refills: 0 | Status: DISCONTINUED | OUTPATIENT
Start: 2017-07-02 | End: 2017-07-02

## 2017-07-02 RX ORDER — POTASSIUM CHLORIDE 20 MEQ
20 PACKET (EA) ORAL ONCE
Qty: 0 | Refills: 0 | Status: COMPLETED | OUTPATIENT
Start: 2017-07-02 | End: 2017-07-02

## 2017-07-02 RX ADMIN — PANTOPRAZOLE SODIUM 40 MILLIGRAM(S): 20 TABLET, DELAYED RELEASE ORAL at 05:23

## 2017-07-02 RX ADMIN — MILRINONE LACTATE 7.65 MICROGRAM(S)/KG/MIN: 1 INJECTION, SOLUTION INTRAVENOUS at 01:29

## 2017-07-02 RX ADMIN — OXYCODONE HYDROCHLORIDE 10 MILLIGRAM(S): 5 TABLET ORAL at 17:40

## 2017-07-02 RX ADMIN — Medication 40 MILLIEQUIVALENT(S): at 05:23

## 2017-07-02 RX ADMIN — SODIUM CHLORIDE 3 MILLILITER(S): 9 INJECTION INTRAMUSCULAR; INTRAVENOUS; SUBCUTANEOUS at 05:12

## 2017-07-02 RX ADMIN — Medication 10.2 MICROGRAM(S)/KG/MIN: at 22:35

## 2017-07-02 RX ADMIN — Medication 2 UNIT(S): at 16:43

## 2017-07-02 RX ADMIN — FENTANYL CITRATE 50 MICROGRAM(S): 50 INJECTION INTRAVENOUS at 12:15

## 2017-07-02 RX ADMIN — AMIODARONE HYDROCHLORIDE 400 MILLIGRAM(S): 400 TABLET ORAL at 05:22

## 2017-07-02 RX ADMIN — FENTANYL CITRATE 50 MICROGRAM(S): 50 INJECTION INTRAVENOUS at 12:30

## 2017-07-02 RX ADMIN — Medication 100 MICROGRAM(S): at 05:23

## 2017-07-02 RX ADMIN — AMIODARONE HYDROCHLORIDE 400 MILLIGRAM(S): 400 TABLET ORAL at 12:24

## 2017-07-02 RX ADMIN — DULOXETINE HYDROCHLORIDE 60 MILLIGRAM(S): 30 CAPSULE, DELAYED RELEASE ORAL at 12:24

## 2017-07-02 RX ADMIN — AMIODARONE HYDROCHLORIDE 400 MILLIGRAM(S): 400 TABLET ORAL at 22:10

## 2017-07-02 RX ADMIN — FENTANYL CITRATE 50 MICROGRAM(S): 50 INJECTION INTRAVENOUS at 12:45

## 2017-07-02 RX ADMIN — FENTANYL CITRATE 50 MICROGRAM(S): 50 INJECTION INTRAVENOUS at 12:00

## 2017-07-02 RX ADMIN — Medication 2 UNIT(S): at 07:46

## 2017-07-02 RX ADMIN — OXYCODONE HYDROCHLORIDE 10 MILLIGRAM(S): 5 TABLET ORAL at 16:40

## 2017-07-02 RX ADMIN — Medication 100 MILLIGRAM(S): at 12:24

## 2017-07-02 RX ADMIN — Medication 650 MILLIGRAM(S): at 05:35

## 2017-07-02 RX ADMIN — Medication 20 MILLIEQUIVALENT(S): at 12:24

## 2017-07-02 RX ADMIN — Medication 100 MILLIGRAM(S): at 05:23

## 2017-07-02 RX ADMIN — Medication 3 MILLILITER(S): at 02:09

## 2017-07-02 RX ADMIN — Medication 2 UNIT(S): at 12:24

## 2017-07-02 RX ADMIN — ATORVASTATIN CALCIUM 10 MILLIGRAM(S): 80 TABLET, FILM COATED ORAL at 22:10

## 2017-07-02 RX ADMIN — Medication 325 MILLIGRAM(S): at 12:24

## 2017-07-02 RX ADMIN — SODIUM CHLORIDE 3 MILLILITER(S): 9 INJECTION INTRAMUSCULAR; INTRAVENOUS; SUBCUTANEOUS at 22:17

## 2017-07-02 RX ADMIN — SODIUM CHLORIDE 3 MILLILITER(S): 9 INJECTION INTRAMUSCULAR; INTRAVENOUS; SUBCUTANEOUS at 12:35

## 2017-07-02 RX ADMIN — TIOTROPIUM BROMIDE 1 CAPSULE(S): 18 CAPSULE ORAL; RESPIRATORY (INHALATION) at 09:05

## 2017-07-02 RX ADMIN — ALBUTEROL 2.5 MILLIGRAM(S): 90 AEROSOL, METERED ORAL at 15:08

## 2017-07-02 RX ADMIN — Medication 100 MILLIGRAM(S): at 22:10

## 2017-07-02 RX ADMIN — ALBUTEROL 2.5 MILLIGRAM(S): 90 AEROSOL, METERED ORAL at 20:36

## 2017-07-02 RX ADMIN — Medication 0.12 MILLIGRAM(S): at 12:24

## 2017-07-02 RX ADMIN — BENZOCAINE AND MENTHOL 1 LOZENGE: 5; 1 LIQUID ORAL at 05:45

## 2017-07-02 RX ADMIN — Medication 20 MILLIGRAM(S): at 22:10

## 2017-07-02 RX ADMIN — ALBUTEROL 2.5 MILLIGRAM(S): 90 AEROSOL, METERED ORAL at 09:04

## 2017-07-02 NOTE — PROGRESS NOTE ADULT - SUBJECTIVE AND OBJECTIVE BOX
[place] [facility], THE HEART CENTER                                   [address] [place] [address]                                                      PHONE: [phone]                                                         FAX: [phone]  [email or web address]  ---------------------------------------------------------------------------------------------------------------------------------    Overnight events/patient complaints:  Mild nausea.  No sig SOB.    PAST MEDICAL & SURGICAL HISTORY:  B12 deficiency anemia  COPD (chronic obstructive pulmonary disease)  Arthritis  Ejection fraction < 50%  CHF (congestive heart failure): last episode  jan 2017   at  [facility], treated with diuretcss  MR (mitral regurgitation): moderate to severe  Anemia  Ventricular tachycardia  Cardiomyopathy  Scleroderma  Chronic back pain  Sleep apnea: uses oral plate  Raynaud's disease  GI bleed not requiring more than 4 units of blood in 24 hours, ICU, or surgery  Hypothyroid  Afib  MI (myocardial infarction): 3 times (2000,2002)  HLD (hyperlipidemia)  HTN (hypertension)  Abnormality of left atrial appendage: s/p Lariat 2012  History of breast surgery: &quot;Lift&quot;  Spinal cord stimulator status: [employer] (2014) recent battery change  AICD (automatic cardioverter/defibrillator) present: [employer] (2006)  Hernia: x2  Bariatric surgery status: gastric bypass 2001      Keflex (Unknown)  [facility] PT (Unknown)    MEDICATIONS  (STANDING):  sodium chloride 0.9% lock flush 3 milliLiter(s) IV Push every 8 hours  sodium chloride 0.9%. 1000 milliLiter(s) (5 mL/Hr) IV Continuous <Continuous>  sodium chloride 0.9%. 1000 milliLiter(s) (10 mL/Hr) IV Continuous <Continuous>  docusate sodium 100 milliGRAM(s) Oral three times a day  insulin Infusion 1 Unit(s)/Hr (1 mL/Hr) IV Continuous <Continuous>  milrinone Infusion 0.375 MICROgram(s)/kG/Min (7.65 mL/Hr) IV Continuous <Continuous>  DOBUTamine Infusion 5 MICROgram(s)/kG/Min (10.2 mL/Hr) IV Continuous <Continuous>  EPINEPHRINE 8 milliGRAM(s),NS 0.9% 250 milliLiter(s) 8 milliGRAM(s) (22.5 mL/Hr) IV Continuous <Continuous>  DULoxetine 60 milliGRAM(s) Oral daily  atorvastatin 10 milliGRAM(s) Oral at bedtime  tiotropium 18 MICROgram(s) Capsule 1 Capsule(s) Inhalation daily  aspirin enteric coated 325 milliGRAM(s) Oral daily  insulin lispro Injectable (HumaLOG) 2 Unit(s) SubCutaneous three times a day before meals  amiodarone    Tablet 400 milliGRAM(s) Oral every 8 hours  digoxin     Tablet 0.125 milliGRAM(s) Oral every other day  levothyroxine 100 MICROGram(s) Oral daily  pantoprazole    Tablet 40 milliGRAM(s) Oral before breakfast  ALBUTerol    0.083% 2.5 milliGRAM(s) Nebulizer every 6 hours  fentaNYL    Injectable 50 MICROGram(s) IV Push once  fentaNYL    Injectable 50 MICROGram(s) IV Push once  potassium chloride   Powder 20 milliEquivalent(s) Oral once    MEDICATIONS  (PRN):  oxyCODONE IR 5 milliGRAM(s) Oral every 4 hours PRN Mild Pain (1 - 3)  oxyCODONE IR 10 milliGRAM(s) Oral every 4 hours PRN Moderate Pain (4 - 6)  benzocaine 15 mG/menthol 3.6 mG Lozenge 1 Lozenge Oral every 3 hours PRN Sore Throat      Vital Signs Last 24 Hrs  T(C): 36.9 (02 Jul 2017 12:00), Max: 37.6 (01 Jul 2017 21:00)  T(F): 98.4 (02 Jul 2017 12:00), Max: 99.7 (01 Jul 2017 21:00)  HR: 86 (02 Jul 2017 12:00) (77 - 92)  BP: --  BP(mean): --  RR: 12 (02 Jul 2017 12:00) (10 - 15)  SpO2: 100% (02 Jul 2017 12:00) (94% - 100%)  ICU Vital Signs Last 24 Hrs  MARGARITO FRIEND  I&O's Detail    01 Jul 2017 07:01  -  02 Jul 2017 07:00  --------------------------------------------------------  IN:    DOBUTamine Infusion: 244.8 mL    freetext medication -  Infusion: 168 mL    insulin Infusion: 3.5 mL    milrinone  Infusion: 184.8 mL    Oral Fluid: 760 mL    sodium chloride 0.9%.: 120 mL    sodium chloride 0.9%.: 240 mL    Solution: 150 mL    Solution: 250 mL    Solution: 50 mL    Solution: 50 mL  Total IN: 2221.1 mL    OUT:    Chest Tube: 250 mL    Chest Tube: 190 mL    Indwelling Catheter - Urethral: 2060 mL  Total OUT: 2500 mL    Total NET: -278.9 mL      02 Jul 2017 07:01  -  02 Jul 2017 12:20  --------------------------------------------------------  IN:    DOBUTamine Infusion: 51 mL    freetext medication -  Infusion: 35 mL    milrinone  Infusion: 38.5 mL    Oral Fluid: 120 mL    sodium chloride 0.9%.: 50 mL    sodium chloride 0.9%.: 25 mL  Total IN: 319.5 mL    OUT:    Chest Tube: 20 mL    Chest Tube: 20 mL    Indwelling Catheter - Urethral: 300 mL  Total OUT: 340 mL    Total NET: -20.5 mL        I&O's Summary    01 Jul 2017 07:01  -  02 Jul 2017 07:00  --------------------------------------------------------  IN: 2221.1 mL / OUT: 2500 mL / NET: -278.9 mL    02 Jul 2017 07:01  -  02 Jul 2017 12:20  --------------------------------------------------------  IN: 319.5 mL / OUT: 340 mL / NET: -20.5 mL      Drug Dosing Weight  MARGARITO FRIEND      PHYSICAL EXAM:  General: Appears well developed, well nourished alert and cooperative.  HEENT: Head; normocephalic, atraumatic.  Eyes: Pupils reactive, cornea wnl.  Neck: Supple, no nodes adenopathy, no NVD or carotid bruit or thyromegaly.  CARDIOVASCULAR: Normal S1 and S2, No murmur, 2 comp rub noted  LUNGS: No rales, rhonchi or wheeze. Normal breath sounds bilaterally.  ABDOMEN: Soft, nontender without mass or organomegaly. bowel sounds normoactive.  EXTREMITIES: No clubbing, cyanosis or edema. Distal pulses wnl.   SKIN: warm and dry with normal turgor.  NEURO: Alert/oriented x 3/normal motor exam. No pathologic reflexes.    PSYCH: normal affect.        LABS:                        9.1    10.6  )-----------( 117      ( 02 Jul 2017 04:01 )             27.1     07-02    131<L>  |  97<L>  |  32.0<H>  ----------------------------<  113  4.2   |  19.0<L>  |  1.36<H>    Ca    6.7<L>      02 Jul 2017 04:01  Phos  3.8     07-02  Mg     2.6     07-02      MARGARITO FRIEND      PT/INR - ( 02 Jul 2017 04:01 )   PT: 10.8 sec;   INR: 0.98 ratio               RADIOLOGY & ADDITIONAL STUDIES:    INTERPRETATION OF TELEMETRY (personally reviewed):    ECG:    ECHO:    STRESS TEST:    CARDIAC CATHETERIZATION:    ASSESSMENT AND PLAN:  In summary, MARGARITO FRIEND is an 71y Female s/p MVR/TVR off IABP/extubated.  No sig CHF/VT.  Mobilize soon.    Thank you for allowing [facility] to participate in the care of this patient.  Please feel free to call with any questions or concerns.

## 2017-07-02 NOTE — PROGRESS NOTE ADULT - SUBJECTIVE AND OBJECTIVE BOX
Subjective: Pt resting in bed comfortably overnight. No acute events overnight to report. During day shift of 7/1 pt with 25 beat run of Vtach and hypotensive 60s/40s.    T(C): 37.4 (07-02-17 @ 01:00), Max: 37.6 (07-01-17 @ 21:00)  HR: 85 (07-02-17 @ 01:00) (77 - 136)  BP: --  ABP: 119/52 (07-02-17 @ 01:00) (89/50 - 131/63)  ABP(mean): 74 (07-02-17 @ 01:00) (69 - 87)  RR: 10 (07-02-17 @ 01:00) (4 - 18)  SpO2: 96% (07-02-17 @ 01:00) (94% - 96%)  Wt(kg): --  CVP(mm Hg): 21 (07-02-17 @ 01:00) (16 - 24)  CO: --  CI: --  PA: --      07-01    135  |  103  |  35.0<H>  ----------------------------<  144<H>  4.5   |  18.0<L>  |  1.55<H>    Ca    7.1<L>      01 Jul 2017 03:53  Phos  2.5     06-30  Mg     2.7     07-01    TPro  5.1<L>  /  Alb  3.4  /  TBili  0.9  /  DBili  x   /  AST  58<H>  /  ALT  19  /  AlkPhos  28<L>  06-30                               9.7    14.3  )-----------( 141      ( 01 Jul 2017 03:53 )             29.0        PT/INR - ( 30 Jun 2017 03:24 )   PT: 14.0 sec;   INR: 1.27 ratio         PTT - ( 30 Jun 2017 03:24 )  PTT:25.9 sec  ABG - ( 01 Jul 2017 17:11 )  pH: 7.37  /  pCO2: 33    /  pO2: 81    / HCO3: 20    / Base Excess: -5.6  /  SaO2: 98                                   CAPILLARY BLOOD GLUCOSE  113 (02 Jul 2017 01:00)  108 (01 Jul 2017 22:00)  90 (01 Jul 2017 19:00)  105 (01 Jul 2017 18:00)  112 (01 Jul 2017 17:00)  102 (01 Jul 2017 16:00)  98 (01 Jul 2017 15:00)  140 (01 Jul 2017 13:00)  129 (01 Jul 2017 12:00)  136 (01 Jul 2017 10:00)  140 (01 Jul 2017 09:00)  121 (01 Jul 2017 08:00)  130 (01 Jul 2017 06:00)  136 (01 Jul 2017 05:00)  144 (01 Jul 2017 04:00)  136 (01 Jul 2017 03:00)  125 (01 Jul 2017 02:00)           CXR:    I&O's Detail    30 Jun 2017 07:01  -  01 Jul 2017 07:00  --------------------------------------------------------  IN:    Albumin 5%  - 250 mL: 250 mL    DOBUTamine Infusion: 244.8 mL    freetext medication -  Infusion: 168 mL    insulin Infusion: 10 mL    milrinone  Infusion: 184.8 mL    norepinephrine Infusion: 120 mL    Oral Fluid: 597 mL    Packed Red Blood Cells: 283 mL    sodium chloride 0.9%.: 120 mL    sodium chloride 0.9%.: 240 mL    Solution: 150 mL    Solution: 500 mL    Solution: 100 mL    Solution: 100 mL    vasopressin Infusion: 25 mL  Total IN: 3092.6 mL    OUT:    Chest Tube: 210 mL    Chest Tube: 290 mL    Indwelling Catheter - Urethral: 1250 mL  Total OUT: 1750 mL    Total NET: 1342.6 mL      01 Jul 2017 07:01  -  02 Jul 2017 01:21  --------------------------------------------------------  IN:    DOBUTamine Infusion: 173.4 mL    freetext medication -  Infusion: 119 mL    insulin Infusion: 3.5 mL    milrinone  Infusion: 130.9 mL    Oral Fluid: 520 mL    sodium chloride 0.9%.: 85 mL    sodium chloride 0.9%.: 170 mL    Solution: 150 mL    Solution: 250 mL    Solution: 50 mL    Solution: 50 mL  Total IN: 1701.8 mL    OUT:    Chest Tube: 180 mL    Chest Tube: 150 mL    Indwelling Catheter - Urethral: 1540 mL  Total OUT: 1870 mL    Total NET: -168.2 mL        Drug Dosing Weight  Height (cm): 154.94 (29 Jun 2017 06:37)  Weight (kg): 68 (29 Jun 2017 06:37)  BMI (kg/m2): 28.3 (29 Jun 2017 06:37)  BSA (m2): 1.67 (29 Jun 2017 06:37)    MEDICATIONS  (STANDING):  sodium chloride 0.9% lock flush 3 milliLiter(s) IV Push every 8 hours  sodium chloride 0.9%. 1000 milliLiter(s) (5 mL/Hr) IV Continuous <Continuous>  sodium chloride 0.9%. 1000 milliLiter(s) (10 mL/Hr) IV Continuous <Continuous>  docusate sodium 100 milliGRAM(s) Oral three times a day  insulin Infusion 1 Unit(s)/Hr (1 mL/Hr) IV Continuous <Continuous>  milrinone Infusion 0.375 MICROgram(s)/kG/Min (7.65 mL/Hr) IV Continuous <Continuous>  DOBUTamine Infusion 5 MICROgram(s)/kG/Min (10.2 mL/Hr) IV Continuous <Continuous>  EPINEPHRINE 8 milliGRAM(s),NS 0.9% 250 milliLiter(s) 8 milliGRAM(s) (22.5 mL/Hr) IV Continuous <Continuous>  ALBUTerol/ipratropium for Nebulization 3 milliLiter(s) Nebulizer every 6 hours  DULoxetine 60 milliGRAM(s) Oral daily  atorvastatin 10 milliGRAM(s) Oral at bedtime  tiotropium 18 MICROgram(s) Capsule 1 Capsule(s) Inhalation daily  aspirin enteric coated 325 milliGRAM(s) Oral daily  insulin lispro Injectable (HumaLOG) 2 Unit(s) SubCutaneous three times a day before meals  amiodarone    Tablet 400 milliGRAM(s) Oral every 8 hours  digoxin     Tablet 0.125 milliGRAM(s) Oral every other day  levothyroxine 100 MICROGram(s) Oral daily  pantoprazole    Tablet 40 milliGRAM(s) Oral before breakfast    MEDICATIONS  (PRN):  oxyCODONE IR 5 milliGRAM(s) Oral every 4 hours PRN Mild Pain (1 - 3)  oxyCODONE IR 10 milliGRAM(s) Oral every 4 hours PRN Moderate Pain (4 - 6)      Physical Exam  Neuro: AAOx3 in NAD  Pulm: CTA b/l  CV: Irregular/Irregular, positive S1/S2  Abd: NT/ND, positive bowel sounds  Extremities: DP 2+, no pitting edema   Incision(s): sternal wound c/d/i

## 2017-07-02 NOTE — PROGRESS NOTE ADULT - ASSESSMENT
71 year old Female with PMH NICM, CHF, s/p AICD, afib, anemia(baseline 9), hx gastric bypass, gi bleeding,  scleroderma, OA, hypothyroidism, GERD. Patient originally presented with complaints of pedal edema, weight gain, SOB with exertion. Patient now POD #3 Mitral valve repair, tricuspid valve repair requiring intraop IABP for biventricular failure/cardiogenic shock.  POD#0: Patient remained intubated and sedated, titrating of gtts for goal MAP 70-80, IABP 1:1, metabolic acidosis and lactemia, treated with sodium bicarb  POD#1: Continue titrating gtts as tolerated, extubated  POD#2: MARTHA req amio bolus w/ PO amio load, 25 beat run Vtach and hypotensive requiring amio bolus  POD#3 IABP weaned overnight,

## 2017-07-03 LAB
ANION GAP SERPL CALC-SCNC: 14 MMOL/L — SIGNIFICANT CHANGE UP (ref 5–17)
BUN SERPL-MCNC: 25 MG/DL — HIGH (ref 8–20)
CALCIUM SERPL-MCNC: 6.6 MG/DL — LOW (ref 8.6–10.2)
CHLORIDE SERPL-SCNC: 99 MMOL/L — SIGNIFICANT CHANGE UP (ref 98–107)
CO2 SERPL-SCNC: 18 MMOL/L — LOW (ref 22–29)
CREAT SERPL-MCNC: 1.15 MG/DL — SIGNIFICANT CHANGE UP (ref 0.5–1.3)
GAS PNL BLDA: SIGNIFICANT CHANGE UP
GLUCOSE SERPL-MCNC: 102 MG/DL — SIGNIFICANT CHANGE UP (ref 70–115)
HCT VFR BLD CALC: 27.2 % — LOW (ref 37–47)
HGB BLD-MCNC: 9.1 G/DL — LOW (ref 12–16)
MAGNESIUM SERPL-MCNC: 2.6 MG/DL — SIGNIFICANT CHANGE UP (ref 1.6–2.6)
MCHC RBC-ENTMCNC: 27.3 PG — SIGNIFICANT CHANGE UP (ref 27–31)
MCHC RBC-ENTMCNC: 33.5 G/DL — SIGNIFICANT CHANGE UP (ref 32–36)
MCV RBC AUTO: 81.7 FL — SIGNIFICANT CHANGE UP (ref 81–99)
PLATELET # BLD AUTO: 104 K/UL — LOW (ref 150–400)
POTASSIUM SERPL-MCNC: 4.3 MMOL/L — SIGNIFICANT CHANGE UP (ref 3.5–5.3)
POTASSIUM SERPL-SCNC: 4.3 MMOL/L — SIGNIFICANT CHANGE UP (ref 3.5–5.3)
RBC # BLD: 3.33 M/UL — LOW (ref 4.4–5.2)
RBC # FLD: 18.7 % — HIGH (ref 11–15.6)
SODIUM SERPL-SCNC: 131 MMOL/L — LOW (ref 135–145)
WBC # BLD: 9.6 K/UL — SIGNIFICANT CHANGE UP (ref 4.8–10.8)
WBC # FLD AUTO: 9.6 K/UL — SIGNIFICANT CHANGE UP (ref 4.8–10.8)

## 2017-07-03 PROCEDURE — 71010: CPT | Mod: 26

## 2017-07-03 RX ORDER — DEXTROSE 50 % IN WATER 50 %
50 SYRINGE (ML) INTRAVENOUS ONCE
Qty: 0 | Refills: 0 | Status: COMPLETED | OUTPATIENT
Start: 2017-07-03 | End: 2017-07-03

## 2017-07-03 RX ORDER — POTASSIUM CHLORIDE 20 MEQ
10 PACKET (EA) ORAL
Qty: 0 | Refills: 0 | Status: COMPLETED | OUTPATIENT
Start: 2017-07-03 | End: 2017-07-03

## 2017-07-03 RX ORDER — FUROSEMIDE 40 MG
20 TABLET ORAL ONCE
Qty: 0 | Refills: 0 | Status: COMPLETED | OUTPATIENT
Start: 2017-07-03 | End: 2017-07-03

## 2017-07-03 RX ORDER — FUROSEMIDE 40 MG
40 TABLET ORAL ONCE
Qty: 0 | Refills: 0 | Status: DISCONTINUED | OUTPATIENT
Start: 2017-07-03 | End: 2017-07-03

## 2017-07-03 RX ORDER — MIDODRINE HYDROCHLORIDE 2.5 MG/1
5 TABLET ORAL EVERY 8 HOURS
Qty: 0 | Refills: 0 | Status: DISCONTINUED | OUTPATIENT
Start: 2017-07-03 | End: 2017-07-05

## 2017-07-03 RX ADMIN — ALBUTEROL 2.5 MILLIGRAM(S): 90 AEROSOL, METERED ORAL at 20:11

## 2017-07-03 RX ADMIN — ALBUTEROL 2.5 MILLIGRAM(S): 90 AEROSOL, METERED ORAL at 03:18

## 2017-07-03 RX ADMIN — MIDODRINE HYDROCHLORIDE 5 MILLIGRAM(S): 2.5 TABLET ORAL at 21:29

## 2017-07-03 RX ADMIN — Medication 325 MILLIGRAM(S): at 12:37

## 2017-07-03 RX ADMIN — Medication 50 MILLILITER(S): at 03:21

## 2017-07-03 RX ADMIN — AMIODARONE HYDROCHLORIDE 400 MILLIGRAM(S): 400 TABLET ORAL at 06:00

## 2017-07-03 RX ADMIN — Medication 100 MILLIGRAM(S): at 06:00

## 2017-07-03 RX ADMIN — Medication 50 MILLIEQUIVALENT(S): at 22:26

## 2017-07-03 RX ADMIN — MIDODRINE HYDROCHLORIDE 5 MILLIGRAM(S): 2.5 TABLET ORAL at 14:23

## 2017-07-03 RX ADMIN — MIDODRINE HYDROCHLORIDE 5 MILLIGRAM(S): 2.5 TABLET ORAL at 09:31

## 2017-07-03 RX ADMIN — ATORVASTATIN CALCIUM 10 MILLIGRAM(S): 80 TABLET, FILM COATED ORAL at 21:29

## 2017-07-03 RX ADMIN — OXYCODONE HYDROCHLORIDE 10 MILLIGRAM(S): 5 TABLET ORAL at 17:30

## 2017-07-03 RX ADMIN — Medication 50 MILLIEQUIVALENT(S): at 21:15

## 2017-07-03 RX ADMIN — DULOXETINE HYDROCHLORIDE 60 MILLIGRAM(S): 30 CAPSULE, DELAYED RELEASE ORAL at 12:36

## 2017-07-03 RX ADMIN — SODIUM CHLORIDE 3 MILLILITER(S): 9 INJECTION INTRAMUSCULAR; INTRAVENOUS; SUBCUTANEOUS at 06:01

## 2017-07-03 RX ADMIN — Medication 100 MICROGRAM(S): at 06:00

## 2017-07-03 RX ADMIN — Medication 100 MILLIGRAM(S): at 14:23

## 2017-07-03 RX ADMIN — PANTOPRAZOLE SODIUM 40 MILLIGRAM(S): 20 TABLET, DELAYED RELEASE ORAL at 06:01

## 2017-07-03 RX ADMIN — Medication 20 MILLIGRAM(S): at 21:15

## 2017-07-03 RX ADMIN — ENOXAPARIN SODIUM 40 MILLIGRAM(S): 100 INJECTION SUBCUTANEOUS at 12:36

## 2017-07-03 RX ADMIN — OXYCODONE HYDROCHLORIDE 10 MILLIGRAM(S): 5 TABLET ORAL at 16:46

## 2017-07-03 RX ADMIN — TIOTROPIUM BROMIDE 1 CAPSULE(S): 18 CAPSULE ORAL; RESPIRATORY (INHALATION) at 08:34

## 2017-07-03 RX ADMIN — AMIODARONE HYDROCHLORIDE 400 MILLIGRAM(S): 400 TABLET ORAL at 21:29

## 2017-07-03 RX ADMIN — MILRINONE LACTATE 7.65 MICROGRAM(S)/KG/MIN: 1 INJECTION, SOLUTION INTRAVENOUS at 03:34

## 2017-07-03 RX ADMIN — Medication 100 MILLIGRAM(S): at 21:29

## 2017-07-03 RX ADMIN — AMIODARONE HYDROCHLORIDE 400 MILLIGRAM(S): 400 TABLET ORAL at 14:23

## 2017-07-03 RX ADMIN — ALBUTEROL 2.5 MILLIGRAM(S): 90 AEROSOL, METERED ORAL at 08:34

## 2017-07-03 NOTE — PROGRESS NOTE ADULT - SUBJECTIVE AND OBJECTIVE BOX
[place] [facility], THE HEART CENTER                                   [address] [place] [address]                                                      PHONE: [phone]                                                         FAX: [phone]  [email or web address]  ---------------------------------------------------------------------------------------------------------------------------------    Overnight events/patient complaints:  Pt feels well, breathing is improving    Keflex (Unknown)  [facility] PT (Unknown)    MEDICATIONS  (STANDING):  sodium chloride 0.9%. 1000 milliLiter(s) (5 mL/Hr) IV Continuous <Continuous>  sodium chloride 0.9%. 1000 milliLiter(s) (10 mL/Hr) IV Continuous <Continuous>  docusate sodium 100 milliGRAM(s) Oral three times a day  milrinone Infusion 0.375 MICROgram(s)/kG/Min (7.65 mL/Hr) IV Continuous <Continuous>  DOBUTamine Infusion 5 MICROgram(s)/kG/Min (10.2 mL/Hr) IV Continuous <Continuous>  EPINEPHRINE 8 milliGRAM(s),NS 0.9% 250 milliLiter(s) 8 milliGRAM(s) (22.5 mL/Hr) IV Continuous <Continuous>  DULoxetine 60 milliGRAM(s) Oral daily  atorvastatin 10 milliGRAM(s) Oral at bedtime  tiotropium 18 MICROgram(s) Capsule 1 Capsule(s) Inhalation daily  aspirin enteric coated 325 milliGRAM(s) Oral daily  amiodarone    Tablet 400 milliGRAM(s) Oral every 8 hours  digoxin     Tablet 0.125 milliGRAM(s) Oral every other day  levothyroxine 100 MICROGram(s) Oral daily  pantoprazole    Tablet 40 milliGRAM(s) Oral before breakfast  ALBUTerol    0.083% 2.5 milliGRAM(s) Nebulizer every 6 hours  enoxaparin Injectable 40 milliGRAM(s) SubCutaneous daily    MEDICATIONS  (PRN):  oxyCODONE IR 5 milliGRAM(s) Oral every 4 hours PRN Mild Pain (1 - 3)  oxyCODONE IR 10 milliGRAM(s) Oral every 4 hours PRN Moderate Pain (4 - 6)  benzocaine 15 mG/menthol 3.6 mG Lozenge 1 Lozenge Oral every 3 hours PRN Sore Throat      Vital Signs Last 24 Hrs  T(C): 37 (02 Jul 2017 20:00), Max: 37 (02 Jul 2017 16:00)  T(F): 98.6 (02 Jul 2017 20:00), Max: 98.6 (02 Jul 2017 16:00)  HR: 69 (03 Jul 2017 08:00) (65 - 90)  BP: --  BP(mean): --  RR: 13 (03 Jul 2017 08:00) (8 - 18)  SpO2: 100% (03 Jul 2017 08:00) (85% - 100%)  ICU Vital Signs Last 24 Hrs  MARGARITO FRIEND  I&O's Detail    02 Jul 2017 07:01  -  03 Jul 2017 07:00  --------------------------------------------------------  IN:    DOBUTamine Infusion: 244.8 mL    freetext medication -  Infusion: 133 mL    milrinone  Infusion: 184.8 mL    Oral Fluid: 720 mL    sodium chloride 0.9%.: 240 mL    sodium chloride 0.9%.: 120 mL  Total IN: 1642.6 mL    OUT:    Chest Tube: 140 mL    Chest Tube: 180 mL    Indwelling Catheter - Urethral: 1660 mL  Total OUT: 1980 mL    Total NET: -337.4 mL      03 Jul 2017 07:01  -  03 Jul 2017 09:07  --------------------------------------------------------  IN:    DOBUTamine Infusion: 10.2 mL    freetext medication -  Infusion: 3 mL    milrinone  Infusion: 7.7 mL    sodium chloride 0.9%.: 5 mL    sodium chloride 0.9%.: 10 mL  Total IN: 35.9 mL    OUT:    Indwelling Catheter - Urethral: 40 mL  Total OUT: 40 mL    Total NET: -4.1 mL        Drug Dosing Weight  MARGARITO FRIEND      PHYSICAL EXAM:  General: Appears well developed, well nourished alert and cooperative.  HEENT: Head; normocephalic, atraumatic.  Eyes: Pupils reactive, cornea wnl.  Neck: Supple, no nodes adenopathy, no NVD or carotid bruit or thyromegaly.  CARDIOVASCULAR: Normal S1 and S2, No murmur, rub, gallop or lift.   LUNGS: No rales, rhonchi or wheeze. Normal breath sounds bilaterally.  ABDOMEN: Soft, nontender without mass or organomegaly. bowel sounds normoactive.  EXTREMITIES: No clubbing, cyanosis or edema. Distal pulses wnl.   SKIN: warm and dry with normal turgor.  NEURO: Alert/oriented x 3/normal motor exam. No pathologic reflexes.    PSYCH: normal affect.        LABS:                        9.1    9.6   )-----------( 104      ( 03 Jul 2017 04:04 )             27.2     07-03    131<L>  |  99  |  25.0<H>  ----------------------------<  102  4.3   |  18.0<L>  |  1.15    Ca    6.6<L>      03 Jul 2017 04:04  Phos  3.8     07-02  Mg     2.6     07-03      MARGARITO FRIEND      PT/INR - ( 02 Jul 2017 04:01 )   PT: 10.8 sec;   INR: 0.98 ratio               RADIOLOGY & ADDITIONAL STUDIES:    INTERPRETATION OF TELEMETRY (personally reviewed):AF      ECHO: < from: [facility] Echo Doppler (04.04.17 @ 08:29) >  Summary:   1. Left ventricular ejection fraction, by visual estimation, is 40 to   45%.   2. Elevated mean left atrial pressure.   3. The left ventricular diastolic function could not be assessed in this   study.   4. S/p Lariat without visible appendage or thrombus.   5. Severely dilated right atrium.   6. Severe mitral valve regurgitation.   7. Thickening of the anterior and posterior mitral valve leaflets.   8. Severe MR by color flow mapping, PISA ERO 0.36 cm2 and vena contracta   0.7 cm.   9. Severetricuspid regurgitation.  10. Small patent foramen ovale, with bidirectional shunting across atrial   septum.  11. Color Doppler demonstrates the presence of a shunt at the Atrial   level.  12. Narrow tunneled PFO with bidirectional shunting on colorflow,   confirmed by contrast and 3D imaging.  13. Severely enlarged left atrium.     Hope Easton MD Electronically signed on 4/4/2017 at 9:22:23 AM    < end of copied text >         ASSESSMENT AND PLAN:  In summary, MARGARITO FRIEND is an 71y Female with past medical history significant for NICM, ICD, Afib sp lariat not on AC, GI bleed, scleroderma, sp Mitral valve repair/Tricuspid valve repair.    1) didnt tolerate wean from epi  2) Will attempt to wean from inotropes  3) will follow

## 2017-07-03 NOTE — PHYSICAL THERAPY INITIAL EVALUATION ADULT - CRITERIA FOR SKILLED THERAPEUTIC INTERVENTIONS
functional limitations in following categories/rehab potential/impairments found/anticipated discharge recommendation

## 2017-07-03 NOTE — PHYSICAL THERAPY INITIAL EVALUATION ADULT - ADDITIONAL COMMENTS
[place] 6[place]6 steps with handrail.  Uses a cane only for outdoor ambulation

## 2017-07-03 NOTE — PROGRESS NOTE ADULT - SUBJECTIVE AND OBJECTIVE BOX
71y Female s/p Mitral valve repair Tricuspid valve repair      Subjective: Patient has no complaints    T(C): 37 (07-02-17 @ 20:00), Max: 37.5 (07-02-17 @ 03:00)  HR: 71 (07-03-17 @ 01:00) (70 - 91)  ABP: 114/52 (07-03-17 @ 01:00) (101/52 - 132/57)   Epinephrine 4cc, Dobutamine 5mcg, primacor 0.375  ABP(mean): 69 (07-03-17 @ 01:00) (67 - 84)  RR: 10 (07-03-17 @ 01:00) (9 - 18)  SpO2: 92% (07-03-17 @ 01:00) (92% - 100%)  CVP(mm Hg): 21 (07-03-17 @ 01:00) (15 - 24)  CO: 3.6 (07-03-17 @ 01:00) (3.5 - 4.2)  CI: 2.2 (07-03-17 @ 01:00) (2.2 - 2.5)        07-02    131<L>  |  97<L>  |  32.0<H>  ----------------------------<  113  4.2   |  19.0<L>  |  1.36<H>    Ca    6.7<L>      02 Jul 2017 04:01  Phos  3.8     07-02  Mg     2.6     07-02                                 9.1    10.6  )-----------( 117      ( 02 Jul 2017 04:01 )             27.1        PT/INR - ( 02 Jul 2017 04:01 )   PT: 10.8 sec;   INR: 0.98 ratio           ABG - ( 02 Jul 2017 19:54 )  pH: 7.38  /  pCO2: 33    /  pO2: 100   / HCO3: 21    / Base Excess: -4.6  /  SaO2: 99                         CAPILLARY BLOOD GLUCOSE  139 (03 Jul 2017 00:00)  75 (02 Jul 2017 22:00)  68 (02 Jul 2017 20:00)  92 (02 Jul 2017 18:00)  105 (02 Jul 2017 14:00)  116 (02 Jul 2017 11:00)  115 (02 Jul 2017 09:00)  116 (02 Jul 2017 07:00)  110 (02 Jul 2017 05:00)  111 (02 Jul 2017 03:00)               CXR:   Support Devices:  Unchanged  Heart: Cardiomegaly  Mediastinum:  Unremarkable  Lungs/Airways: No pulmonary edema and opacities stable.  Bones/Soft tissues: Unremarkable            I&O's Detail    01 Jul 2017 07:01  -  02 Jul 2017 07:00  --------------------------------------------------------  IN:    DOBUTamine Infusion: 244.8 mL    freetext medication -  Infusion: 168 mL    insulin Infusion: 3.5 mL    milrinone  Infusion: 184.8 mL    Oral Fluid: 760 mL    sodium chloride 0.9%.: 120 mL    sodium chloride 0.9%.: 240 mL    Solution: 150 mL    Solution: 250 mL    Solution: 50 mL    Solution: 50 mL  Total IN: 2221.1 mL    OUT:    Chest Tube: 190 mL    Chest Tube: 250 mL    Indwelling Catheter - Urethral: 2060 mL  Total OUT: 2500 mL    Total NET: -278.9 mL      02 Jul 2017 07:01  -  03 Jul 2017 01:41  --------------------------------------------------------  IN:    DOBUTamine Infusion: 183.6 mL    freetext medication -  Infusion: 112 mL    milrinone  Infusion: 138.6 mL    Oral Fluid: 720 mL    sodium chloride 0.9%.: 180 mL    sodium chloride 0.9%.: 90 mL  Total IN: 1424.2 mL    OUT:    Chest Tube: 150 mL    Chest Tube: 120 mL    Indwelling Catheter - Urethral: 1305 mL  Total OUT: 1575 mL    Total NET: -150.8 mL          MEDICATIONS  (STANDING):  sodium chloride 0.9% lock flush 3 milliLiter(s) IV Push every 8 hours  sodium chloride 0.9%. 1000 milliLiter(s) (5 mL/Hr) IV Continuous <Continuous>  sodium chloride 0.9%. 1000 milliLiter(s) (10 mL/Hr) IV Continuous <Continuous>  docusate sodium 100 milliGRAM(s) Oral three times a day  insulin Infusion 1 Unit(s)/Hr (1 mL/Hr) IV Continuous <Continuous>  milrinone Infusion 0.375 MICROgram(s)/kG/Min (7.65 mL/Hr) IV Continuous <Continuous>  DOBUTamine Infusion 5 MICROgram(s)/kG/Min (10.2 mL/Hr) IV Continuous <Continuous>  EPINEPHRINE 8 milliGRAM(s),NS 0.9% 250 milliLiter(s) 8 milliGRAM(s) (22.5 mL/Hr) IV Continuous <Continuous>  DULoxetine 60 milliGRAM(s) Oral daily  atorvastatin 10 milliGRAM(s) Oral at bedtime  tiotropium 18 MICROgram(s) Capsule 1 Capsule(s) Inhalation daily  aspirin enteric coated 325 milliGRAM(s) Oral daily  amiodarone    Tablet 400 milliGRAM(s) Oral every 8 hours  digoxin     Tablet 0.125 milliGRAM(s) Oral every other day  levothyroxine 100 MICROGram(s) Oral daily  pantoprazole    Tablet 40 milliGRAM(s) Oral before breakfast  ALBUTerol    0.083% 2.5 milliGRAM(s) Nebulizer every 6 hours  enoxaparin Injectable 40 milliGRAM(s) SubCutaneous daily    MEDICATIONS  (PRN):  oxyCODONE IR 5 milliGRAM(s) Oral every 4 hours PRN Mild Pain (1 - 3)  oxyCODONE IR 10 milliGRAM(s) Oral every 4 hours PRN Moderate Pain (4 - 6)  benzocaine 15 mG/menthol 3.6 mG Lozenge 1 Lozenge Oral every 3 hours PRN Sore Throat      Physical Exam  Neuro: A+O x 3, non-focal, speech clear and intact  Pulm: CTA, equal bilaterally  CV: RRR, +S1S2, no murmur auscultated  Abd: soft, NT, ND  Ext: CORONADO x 4, no edema, no hematoma appreciated    -----------------------------------------------------------------------------------------------------------------------------------------------------------------------------

## 2017-07-03 NOTE — PROGRESS NOTE ADULT - ASSESSMENT
71 year old Female with PMH NICM, CHF, s/p AICD, afib, anemia(baseline 9), hx gastric bypass, gi bleeding,  scleroderma, OA, hypothyroidism, GERD. Patient originally presented with complaints of pedal edema, weight gain, SOB with exertion. Patient s/p Mitral valve repair, tricuspid valve repair requiring intraop IABP for biventricular failure/cardiogenic shock. Patient's IABP successfully weaned, and now continuing to wean inotropic / vasopressor support

## 2017-07-04 LAB
ANION GAP SERPL CALC-SCNC: 12 MMOL/L — SIGNIFICANT CHANGE UP (ref 5–17)
BUN SERPL-MCNC: 20 MG/DL — SIGNIFICANT CHANGE UP (ref 8–20)
CALCIUM SERPL-MCNC: 6.7 MG/DL — LOW (ref 8.6–10.2)
CHLORIDE SERPL-SCNC: 103 MMOL/L — SIGNIFICANT CHANGE UP (ref 98–107)
CO2 SERPL-SCNC: 19 MMOL/L — LOW (ref 22–29)
CREAT SERPL-MCNC: 0.97 MG/DL — SIGNIFICANT CHANGE UP (ref 0.5–1.3)
GLUCOSE SERPL-MCNC: 85 MG/DL — SIGNIFICANT CHANGE UP (ref 70–115)
HCT VFR BLD CALC: 26.6 % — LOW (ref 37–47)
HGB BLD-MCNC: 8.8 G/DL — LOW (ref 12–16)
MAGNESIUM SERPL-MCNC: 2.5 MG/DL — SIGNIFICANT CHANGE UP (ref 1.8–2.6)
MCHC RBC-ENTMCNC: 27.2 PG — SIGNIFICANT CHANGE UP (ref 27–31)
MCHC RBC-ENTMCNC: 33.1 G/DL — SIGNIFICANT CHANGE UP (ref 32–36)
MCV RBC AUTO: 82.1 FL — SIGNIFICANT CHANGE UP (ref 81–99)
PHOSPHATE SERPL-MCNC: 2.4 MG/DL — SIGNIFICANT CHANGE UP (ref 2.4–4.7)
PLATELET # BLD AUTO: 133 K/UL — LOW (ref 150–400)
POTASSIUM SERPL-MCNC: 4.3 MMOL/L — SIGNIFICANT CHANGE UP (ref 3.5–5.3)
POTASSIUM SERPL-SCNC: 4.3 MMOL/L — SIGNIFICANT CHANGE UP (ref 3.5–5.3)
RBC # BLD: 3.24 M/UL — LOW (ref 4.4–5.2)
RBC # FLD: 18.7 % — HIGH (ref 11–15.6)
SODIUM SERPL-SCNC: 134 MMOL/L — LOW (ref 135–145)
WBC # BLD: 7.7 K/UL — SIGNIFICANT CHANGE UP (ref 4.8–10.8)
WBC # FLD AUTO: 7.7 K/UL — SIGNIFICANT CHANGE UP (ref 4.8–10.8)

## 2017-07-04 PROCEDURE — 93010 ELECTROCARDIOGRAM REPORT: CPT

## 2017-07-04 PROCEDURE — 71010: CPT | Mod: 26

## 2017-07-04 RX ORDER — AMIODARONE HYDROCHLORIDE 400 MG/1
200 TABLET ORAL DAILY
Qty: 0 | Refills: 0 | Status: DISCONTINUED | OUTPATIENT
Start: 2017-07-04 | End: 2017-07-08

## 2017-07-04 RX ADMIN — OXYCODONE HYDROCHLORIDE 10 MILLIGRAM(S): 5 TABLET ORAL at 10:20

## 2017-07-04 RX ADMIN — AMIODARONE HYDROCHLORIDE 200 MILLIGRAM(S): 400 TABLET ORAL at 22:41

## 2017-07-04 RX ADMIN — ALBUTEROL 2.5 MILLIGRAM(S): 90 AEROSOL, METERED ORAL at 15:41

## 2017-07-04 RX ADMIN — ALBUTEROL 2.5 MILLIGRAM(S): 90 AEROSOL, METERED ORAL at 03:43

## 2017-07-04 RX ADMIN — PANTOPRAZOLE SODIUM 40 MILLIGRAM(S): 20 TABLET, DELAYED RELEASE ORAL at 06:53

## 2017-07-04 RX ADMIN — Medication 0.12 MILLIGRAM(S): at 15:15

## 2017-07-04 RX ADMIN — OXYCODONE HYDROCHLORIDE 10 MILLIGRAM(S): 5 TABLET ORAL at 18:34

## 2017-07-04 RX ADMIN — AMIODARONE HYDROCHLORIDE 400 MILLIGRAM(S): 400 TABLET ORAL at 06:53

## 2017-07-04 RX ADMIN — DULOXETINE HYDROCHLORIDE 60 MILLIGRAM(S): 30 CAPSULE, DELAYED RELEASE ORAL at 15:15

## 2017-07-04 RX ADMIN — Medication 100 MILLIGRAM(S): at 22:40

## 2017-07-04 RX ADMIN — Medication 325 MILLIGRAM(S): at 15:15

## 2017-07-04 RX ADMIN — ATORVASTATIN CALCIUM 10 MILLIGRAM(S): 80 TABLET, FILM COATED ORAL at 22:41

## 2017-07-04 RX ADMIN — TIOTROPIUM BROMIDE 1 CAPSULE(S): 18 CAPSULE ORAL; RESPIRATORY (INHALATION) at 08:23

## 2017-07-04 RX ADMIN — ENOXAPARIN SODIUM 40 MILLIGRAM(S): 100 INJECTION SUBCUTANEOUS at 15:16

## 2017-07-04 RX ADMIN — Medication 100 MICROGRAM(S): at 06:53

## 2017-07-04 RX ADMIN — Medication 100 MILLIGRAM(S): at 15:15

## 2017-07-04 RX ADMIN — ALBUTEROL 2.5 MILLIGRAM(S): 90 AEROSOL, METERED ORAL at 08:13

## 2017-07-04 RX ADMIN — MIDODRINE HYDROCHLORIDE 5 MILLIGRAM(S): 2.5 TABLET ORAL at 22:41

## 2017-07-04 RX ADMIN — AMIODARONE HYDROCHLORIDE 400 MILLIGRAM(S): 400 TABLET ORAL at 15:15

## 2017-07-04 RX ADMIN — OXYCODONE HYDROCHLORIDE 10 MILLIGRAM(S): 5 TABLET ORAL at 19:30

## 2017-07-04 RX ADMIN — Medication 100 MILLIGRAM(S): at 06:53

## 2017-07-04 RX ADMIN — OXYCODONE HYDROCHLORIDE 10 MILLIGRAM(S): 5 TABLET ORAL at 10:10

## 2017-07-04 RX ADMIN — MIDODRINE HYDROCHLORIDE 5 MILLIGRAM(S): 2.5 TABLET ORAL at 06:53

## 2017-07-04 RX ADMIN — MIDODRINE HYDROCHLORIDE 5 MILLIGRAM(S): 2.5 TABLET ORAL at 15:16

## 2017-07-04 NOTE — PROGRESS NOTE ADULT - PROBLEM SELECTOR PLAN 10
improved    Further planning to be discussed with attending surgeon at CTICU AM Rounds.
Post op lactemia likely related to Epinephrine  Metabolic acidosis improving with sodium bicarbonate given post-op      Further planning to be discussed with attending surgeon at [facility]ICU AM Rounds.

## 2017-07-04 NOTE — PROGRESS NOTE ADULT - SUBJECTIVE AND OBJECTIVE BOX
71y Female s/p Mitral valve repair  Tricuspid valve repair      Subjective: No acute complaints    T(C): 37 (07-03-17 @ 18:00), Max: 37.1 (07-03-17 @ 08:00)  HR: 67 (07-04-17 @ 00:00) (65 - 74)   (A-fib)  BP: 120/60 (07-03-17 @ 21:00) (120/60 - 120/60)  ABP: 119/50 (07-04-17 @ 00:00) (106/47 - 133/56)  ABP(mean): 72 (07-04-17 @ 00:00) (63 - 78)  RR: 10 (07-04-17 @ 00:00) (8 - 18)  SpO2: 100% (07-04-17 @ 00:00) (85% - 100%)  CVP(mm Hg): 16 (07-04-17 @ 00:00) (15 - 35)  CO: 3.7 (07-04-17 @ 00:00) (3.6 - 4.3)  CI: 2.2 (07-04-17 @ 00:00) (2.2 - 2.5)          07-03    131<L>  |  99  |  25.0<H>  ----------------------------<  102  4.3   |  18.0<L>  |  1.15    Ca    6.6<L>      03 Jul 2017 04:04  Phos  3.8     07-02  Mg     2.6     07-03                                 9.1    9.6   )-----------( 104      ( 03 Jul 2017 04:04 )             27.2        PT/INR - ( 02 Jul 2017 04:01 )   PT: 10.8 sec;   INR: 0.98 ratio           ABG - ( 03 Jul 2017 20:05 )  pH: 7.40  /  pCO2: 32    /  pO2: 106   / HCO3: 20    / Base Excess: -4.7  /  SaO2: 98                         CAPILLARY BLOOD GLUCOSE  94 (03 Jul 2017 17:00)  90 (03 Jul 2017 12:00)  92 (03 Jul 2017 08:00)  80 (03 Jul 2017 06:00)  91 (03 Jul 2017 04:00)  93 (03 Jul 2017 02:00)               CXR:   Removal of mediastinal drains. Decreasing congestive heart failure..          I&O's Detail    02 Jul 2017 07:01  -  03 Jul 2017 07:00  --------------------------------------------------------  IN:    DOBUTamine Infusion: 244.8 mL    freetext medication -  Infusion: 133 mL    milrinone  Infusion: 184.8 mL    Oral Fluid: 720 mL    sodium chloride 0.9%.: 240 mL    sodium chloride 0.9%.: 120 mL  Total IN: 1642.6 mL    OUT:    Chest Tube: 140 mL    Chest Tube: 180 mL    Indwelling Catheter - Urethral: 1660 mL  Total OUT: 1980 mL    Total NET: -337.4 mL      03 Jul 2017 07:01  -  04 Jul 2017 01:01  --------------------------------------------------------  IN:    DOBUTamine Infusion: 183.6 mL    freetext medication -  Infusion: 24 mL    milrinone  Infusion: 65.6 mL    milrinone  Infusion: 15.4 mL    Oral Fluid: 100 mL    sodium chloride 0.9%.: 90 mL    sodium chloride 0.9%.: 180 mL    Solution: 100 mL  Total IN: 758.6 mL    OUT:    Indwelling Catheter - Urethral: 1430 mL  Total OUT: 1430 mL    Total NET: -671.4 mL          MEDICATIONS  (STANDING):  sodium chloride 0.9%. 1000 milliLiter(s) (5 mL/Hr) IV Continuous <Continuous>  sodium chloride 0.9%. 1000 milliLiter(s) (10 mL/Hr) IV Continuous <Continuous>  docusate sodium 100 milliGRAM(s) Oral three times a day  milrinone Infusion 0.2 MICROgram(s)/kG/Min (4.08 mL/Hr) IV Continuous <Continuous>  DOBUTamine Infusion 5 MICROgram(s)/kG/Min (10.2 mL/Hr) IV Continuous <Continuous>  DULoxetine 60 milliGRAM(s) Oral daily  atorvastatin 10 milliGRAM(s) Oral at bedtime  tiotropium 18 MICROgram(s) Capsule 1 Capsule(s) Inhalation daily  aspirin enteric coated 325 milliGRAM(s) Oral daily  amiodarone    Tablet 400 milliGRAM(s) Oral every 8 hours  amiodarone    Tablet 200 milliGRAM(s) Oral daily  digoxin     Tablet 0.125 milliGRAM(s) Oral every other day  levothyroxine 100 MICROGram(s) Oral daily  pantoprazole    Tablet 40 milliGRAM(s) Oral before breakfast  ALBUTerol    0.083% 2.5 milliGRAM(s) Nebulizer every 6 hours  enoxaparin Injectable 40 milliGRAM(s) SubCutaneous daily  midodrine 5 milliGRAM(s) Oral every 8 hours    MEDICATIONS  (PRN):  oxyCODONE IR 5 milliGRAM(s) Oral every 4 hours PRN Mild Pain (1 - 3)  oxyCODONE IR 10 milliGRAM(s) Oral every 4 hours PRN Moderate Pain (4 - 6)  benzocaine 15 mG/menthol 3.6 mG Lozenge 1 Lozenge Oral every 3 hours PRN Sore Throat      Physical Exam  Neuro: A+O x 3, non-focal, speech clear and intact  Pulm: CTA, equal bilaterally  CV: RRR, +S1S2  Abd: soft, NT, ND, +BS  Ext: CORONADO x 4, no edema  Inc: MSI C/D/I/stable w/ dsg

## 2017-07-05 LAB
ANION GAP SERPL CALC-SCNC: 12 MMOL/L — SIGNIFICANT CHANGE UP (ref 5–17)
BUN SERPL-MCNC: 15 MG/DL — SIGNIFICANT CHANGE UP (ref 8–20)
CALCIUM SERPL-MCNC: 7.4 MG/DL — LOW (ref 8.6–10.2)
CHLORIDE SERPL-SCNC: 101 MMOL/L — SIGNIFICANT CHANGE UP (ref 98–107)
CO2 SERPL-SCNC: 20 MMOL/L — LOW (ref 22–29)
CREAT SERPL-MCNC: 0.87 MG/DL — SIGNIFICANT CHANGE UP (ref 0.5–1.3)
GLUCOSE SERPL-MCNC: 83 MG/DL — SIGNIFICANT CHANGE UP (ref 70–115)
HCT VFR BLD CALC: 28.6 % — LOW (ref 37–47)
HGB BLD-MCNC: 9.4 G/DL — LOW (ref 12–16)
MAGNESIUM SERPL-MCNC: 2.6 MG/DL — SIGNIFICANT CHANGE UP (ref 1.6–2.6)
MCHC RBC-ENTMCNC: 27.2 PG — SIGNIFICANT CHANGE UP (ref 27–31)
MCHC RBC-ENTMCNC: 32.9 G/DL — SIGNIFICANT CHANGE UP (ref 32–36)
MCV RBC AUTO: 82.7 FL — SIGNIFICANT CHANGE UP (ref 81–99)
PHOSPHATE SERPL-MCNC: 2 MG/DL — LOW (ref 2.4–4.7)
PLATELET # BLD AUTO: 181 K/UL — SIGNIFICANT CHANGE UP (ref 150–400)
POTASSIUM SERPL-MCNC: 4.6 MMOL/L — SIGNIFICANT CHANGE UP (ref 3.5–5.3)
POTASSIUM SERPL-SCNC: 4.6 MMOL/L — SIGNIFICANT CHANGE UP (ref 3.5–5.3)
RBC # BLD: 3.46 M/UL — LOW (ref 4.4–5.2)
RBC # FLD: 19.4 % — HIGH (ref 11–15.6)
SODIUM SERPL-SCNC: 133 MMOL/L — LOW (ref 135–145)
WBC # BLD: 7.5 K/UL — SIGNIFICANT CHANGE UP (ref 4.8–10.8)
WBC # FLD AUTO: 7.5 K/UL — SIGNIFICANT CHANGE UP (ref 4.8–10.8)

## 2017-07-05 PROCEDURE — 71010: CPT | Mod: 26

## 2017-07-05 PROCEDURE — 93010 ELECTROCARDIOGRAM REPORT: CPT

## 2017-07-05 RX ORDER — ACETAMINOPHEN 500 MG
650 TABLET ORAL EVERY 6 HOURS
Qty: 0 | Refills: 0 | Status: DISCONTINUED | OUTPATIENT
Start: 2017-07-05 | End: 2017-07-08

## 2017-07-05 RX ORDER — FERROUS SULFATE 325(65) MG
325 TABLET ORAL DAILY
Qty: 0 | Refills: 0 | Status: DISCONTINUED | OUTPATIENT
Start: 2017-07-05 | End: 2017-07-07

## 2017-07-05 RX ORDER — ONDANSETRON 8 MG/1
4 TABLET, FILM COATED ORAL ONCE
Qty: 0 | Refills: 0 | Status: COMPLETED | OUTPATIENT
Start: 2017-07-05 | End: 2017-07-05

## 2017-07-05 RX ORDER — SODIUM CHLORIDE 9 MG/ML
3 INJECTION INTRAMUSCULAR; INTRAVENOUS; SUBCUTANEOUS EVERY 8 HOURS
Qty: 0 | Refills: 0 | Status: DISCONTINUED | OUTPATIENT
Start: 2017-07-05 | End: 2017-07-08

## 2017-07-05 RX ORDER — FOLIC ACID 0.8 MG
1 TABLET ORAL DAILY
Qty: 0 | Refills: 0 | Status: DISCONTINUED | OUTPATIENT
Start: 2017-07-05 | End: 2017-07-07

## 2017-07-05 RX ORDER — ASCORBIC ACID 60 MG
500 TABLET,CHEWABLE ORAL DAILY
Qty: 0 | Refills: 0 | Status: DISCONTINUED | OUTPATIENT
Start: 2017-07-05 | End: 2017-07-07

## 2017-07-05 RX ADMIN — TIOTROPIUM BROMIDE 1 CAPSULE(S): 18 CAPSULE ORAL; RESPIRATORY (INHALATION) at 08:22

## 2017-07-05 RX ADMIN — PANTOPRAZOLE SODIUM 40 MILLIGRAM(S): 20 TABLET, DELAYED RELEASE ORAL at 06:56

## 2017-07-05 RX ADMIN — ATORVASTATIN CALCIUM 10 MILLIGRAM(S): 80 TABLET, FILM COATED ORAL at 20:58

## 2017-07-05 RX ADMIN — ALBUTEROL 2.5 MILLIGRAM(S): 90 AEROSOL, METERED ORAL at 15:29

## 2017-07-05 RX ADMIN — SODIUM CHLORIDE 3 MILLILITER(S): 9 INJECTION INTRAMUSCULAR; INTRAVENOUS; SUBCUTANEOUS at 21:00

## 2017-07-05 RX ADMIN — MIDODRINE HYDROCHLORIDE 5 MILLIGRAM(S): 2.5 TABLET ORAL at 06:56

## 2017-07-05 RX ADMIN — SODIUM CHLORIDE 3 MILLILITER(S): 9 INJECTION INTRAMUSCULAR; INTRAVENOUS; SUBCUTANEOUS at 12:54

## 2017-07-05 RX ADMIN — Medication 1 MILLIGRAM(S): at 13:03

## 2017-07-05 RX ADMIN — Medication 500 MILLIGRAM(S): at 13:03

## 2017-07-05 RX ADMIN — Medication 100 MICROGRAM(S): at 06:57

## 2017-07-05 RX ADMIN — Medication 100 MILLIGRAM(S): at 14:40

## 2017-07-05 RX ADMIN — ALBUTEROL 2.5 MILLIGRAM(S): 90 AEROSOL, METERED ORAL at 08:22

## 2017-07-05 RX ADMIN — ENOXAPARIN SODIUM 40 MILLIGRAM(S): 100 INJECTION SUBCUTANEOUS at 13:03

## 2017-07-05 RX ADMIN — AMIODARONE HYDROCHLORIDE 200 MILLIGRAM(S): 400 TABLET ORAL at 06:56

## 2017-07-05 RX ADMIN — Medication 100 MILLIGRAM(S): at 20:58

## 2017-07-05 RX ADMIN — ALBUTEROL 2.5 MILLIGRAM(S): 90 AEROSOL, METERED ORAL at 20:20

## 2017-07-05 RX ADMIN — ONDANSETRON 4 MILLIGRAM(S): 8 TABLET, FILM COATED ORAL at 05:10

## 2017-07-05 RX ADMIN — Medication 325 MILLIGRAM(S): at 13:03

## 2017-07-05 RX ADMIN — DULOXETINE HYDROCHLORIDE 60 MILLIGRAM(S): 30 CAPSULE, DELAYED RELEASE ORAL at 13:03

## 2017-07-05 NOTE — PROGRESS NOTE ADULT - SUBJECTIVE AND OBJECTIVE BOX
[facility], [facility]                                   [address] [place] [address]                                                      PHONE: [phone]                                                         FAX: [phone]  -------------------------------------------------------------------------------------------------------------------------------    Pt seen and examined. FU for  MVR/TVR    Overnight events/patient complaints: Pt without complains.    Vital Signs Last 24 Hrs  T(C): 36.7 (05 Jul 2017 05:00), Max: 36.7 (05 Jul 2017 05:00)  T(F): 98 (05 Jul 2017 05:00), Max: 98 (05 Jul 2017 05:00)  HR: 74 (05 Jul 2017 12:00) (56 - 74)  BP: 146/70 (05 Jul 2017 12:00) (121/60 - 146/70)  BP(mean): 101 (05 Jul 2017 12:00) (86 - 101)  RR: 18 (05 Jul 2017 12:00) (11 - 19)  SpO2: 100% (05 Jul 2017 12:00) (96% - 100%)  I&O's Summary    04 Jul 2017 07:01  -  05 Jul 2017 07:00  --------------------------------------------------------  IN: 304.2 mL / OUT: 2020 mL / NET: -1715.8 mL    05 Jul 2017 07:01  -  05 Jul 2017 12:59  --------------------------------------------------------  IN: 0 mL / OUT: 120 mL / NET: -120 mL        PHYSICAL EXAM:  Constitutional: Oriented to time, place and person. Appears well developed, well nourished; alert and co-operative  HEENT:     Conjunctiva normal, Normal oral mucosa, No JVD	  Cardiovascular: S1, S2 irregular  Respiratory: Lungs clear to auscultation; no crepitations, no wheeze  Gastrointestinal:  Soft, Non-tender, + BS	  Extremities: No cyanosis, clubbing or edema  Skin: Warm and dry  Neurologic: Alert oriented to time place and person  Psychiatric: affect was normal        LABS:                        9.4    7.5   )-----------( 181      ( 05 Jul 2017 04:43 )             28.6     07-05    133<L>  |  101  |  15.0  ----------------------------<  83  4.6   |  20.0<L>  |  0.87    Ca    7.4<L>      05 Jul 2017 04:43  Phos  2.0     07-05  Mg     2.6     07-05    RADIOLOGY & ADDITIONAL STUDIES:    CARDIOLOGY TESTING:     Telemetry: Paced rhythm    Echocardiogram:  < from: [facility] Echo Doppler (04.04.17 @ 08:29) >   1. Left ventricular ejection fraction, by visual estimation, is 40 to   45%.   2. Elevated mean left atrial pressure.   3. The left ventricular diastolic function could not be assessed in this   study.   4. S/p Lariat without visible appendage or thrombus.   5. Severely dilated right atrium.   6. Severe mitral valve regurgitation.   7. Thickening of the anterior and posterior mitral valve leaflets.   8. Severe MR by color flow mapping, PISA ERO 0.36 cm2 and vena contracta   0.7 cm.   9. Severetricuspid regurgitation.  10. Small patent foramen ovale, with bidirectional shunting across atrial   septum.  11. Color Doppler demonstrates the presence of a shunt at the Atrial   level.  12. Narrow tunneled PFO with bidirectional shunting on colorflow,   confirmed by contrast and 3D imaging.  13. Severely enlarged left atrium.    < end of copied text >    Cardiac Catheterization:  < from: Cardiac Cath Lab - Adult (04.14.17 @ 09:16) >  VENTRICLES: EF 50% w/ severe MR.  CORONARY VESSELS: R dominant.  Minimal LAD/RCA disease.  COMPLICATIONS: No complications occurred during the cath lab visit.  DIAGNOSTIC IMPRESSIONS: Moderate pulmonary HTN.  Preserved EF w/ severe MR.  No significant CAD.  Hx back injury/has a spinal stimulator.  DIAGNOSTIC RECOMMENDATIONS: Outpt evaluation for mitraclip.  INTERVENTIONAL IMPRESSIONS: Moderate pulmonary HTN.  Preserved EF w/ severe MR.  No significant CAD.  Hx back injury/has a spinal stimulator.  INTERVENTIONAL RECOMMENDATIONS: Outpt evaluation for mitraclip.      MEDICATIONS:  MEDICATIONS  (STANDING):  docusate sodium 100 milliGRAM(s) Oral three times a day  DULoxetine 60 milliGRAM(s) Oral daily  atorvastatin 10 milliGRAM(s) Oral at bedtime  tiotropium 18 MICROgram(s) Capsule 1 Capsule(s) Inhalation daily  aspirin enteric coated 325 milliGRAM(s) Oral daily  levothyroxine 100 MICROGram(s) Oral daily  pantoprazole    Tablet 40 milliGRAM(s) Oral before breakfast  ALBUTerol    0.083% 2.5 milliGRAM(s) Nebulizer every 6 hours  enoxaparin Injectable 40 milliGRAM(s) SubCutaneous daily  midodrine 5 milliGRAM(s) Oral every 8 hours  amiodarone    Tablet 200 milliGRAM(s) Oral daily  ferrous    sulfate 325 milliGRAM(s) Oral daily  ascorbic acid 500 milliGRAM(s) Oral daily  folic acid 1 milliGRAM(s) Oral daily  sodium chloride 0.9% lock flush 3 milliLiter(s) IV Push every 8 hours    MEDICATIONS  (PRN):  oxyCODONE IR 5 milliGRAM(s) Oral every 4 hours PRN Mild Pain (1 - 3)  oxyCODONE IR 10 milliGRAM(s) Oral every 4 hours PRN Moderate Pain (4 - 6)  benzocaine 15 mG/menthol 3.6 mG Lozenge 1 Lozenge Oral every 3 hours PRN Sore Throat      ASSESSMENT AND PLAN:    71y Female with prior history of NICM, CHF, s/p AICD, afib, anemia(baseline 9), hx gastric bypass, gi bleeding,  scleroderma, OA, hypothyroidism, GERD, s/p Mitral valve repair, tricuspid valve repair requiring intraop IABP for RV failure.s/p Lariat in the past    -  Off pressors  -  MX as per CTS

## 2017-07-05 NOTE — PROGRESS NOTE ADULT - ASSESSMENT
71F, pmhx NICM, chronic systoilc CHF, s/p AICD, afib, anemia(baseline hb 9), hx gastric bypass, GI bleed, scleroderma, OA, hypothyroidism, GERD, 6/29 s/p Mitral valve repair, tricuspid valve repair requiring intraop IABP for biventricular failure/cardiogenic shock, IABP removed7/2, no further pressor requirements, continuing to wean inotropes;

## 2017-07-05 NOTE — PROGRESS NOTE ADULT - SUBJECTIVE AND OBJECTIVE BOX
Subjective: no complaints, slept well overnight     T(C): 36.6 (07-04-17 @ 16:00), Max: 36.9 (07-04-17 @ 08:00)  HR: 56 (07-05-17 @ 05:00) (56 - 69)  BP: 143/65 (07-05-17 @ 04:00) (121/60 - 143/65)  ABP: 142/55 (07-05-17 @ 05:00) (111/48 - 147/55)  ABP(mean): 81 (07-05-17 @ 05:00) (68 - 85)  RR: 12 (07-05-17 @ 05:00) (11 - 22)  SpO2: 100% (07-05-17 @ 05:00) (96% - 100%)  Wt(kg): --  CVP(mm Hg): 15 (07-04-17 @ 16:00) (7 - 22)  CO: 4.1 (07-04-17 @ 08:00) (3.7 - 4.1)  CI: 2.4 (07-04-17 @ 08:00) (2.2 - 2.4)  PA: --      07-05    133<L>  |  101  |  15.0  ----------------------------<  83  4.6   |  20.0<L>  |  0.87    Ca    7.4<L>      05 Jul 2017 04:43  Phos  2.0     07-05  Mg     2.6     07-05                                 9.4    7.5   )-----------( 181      ( 05 Jul 2017 04:43 )             28.6          ABG - ( 03 Jul 2017 20:05 )  pH: 7.40  /  pCO2: 32    /  pO2: 106   / HCO3: 20    / Base Excess: -4.7  /  SaO2: 98        CXR: PVC, small b /l pleural effusions    I&O's Detail    03 Jul 2017 07:01  -  04 Jul 2017 07:00  --------------------------------------------------------  IN:    DOBUTamine Infusion: 244.8 mL    freetext medication -  Infusion: 24 mL    milrinone  Infusion: 90.2 mL    milrinone  Infusion: 15.4 mL    Oral Fluid: 350 mL    sodium chloride 0.9%.: 240 mL    sodium chloride 0.9%.: 120 mL    Solution: 100 mL  Total IN: 1184.4 mL    OUT:    Indwelling Catheter - Urethral: 2265 mL  Total OUT: 2265 mL    Total NET: -1080.6 mL    04 Jul 2017 07:01  -  05 Jul 2017 06:02  --------------------------------------------------------  IN:    DOBUTamine Infusion: 10.2 mL    DOBUTamine Infusion: 30.6 mL    milrinone  Infusion: 90.2 mL    sodium chloride 0.9%.: 55 mL    sodium chloride 0.9%.: 110 mL  Total IN: 296 mL    OUT:    Indwelling Catheter - Urethral: 1770 mL  Total OUT: 1770 mL    Total NET: -1474 mL    Drug Dosing Weight  Height (cm): 154.94 (29 Jun 2017 06:37)  Weight (kg): 68 (29 Jun 2017 06:37)  BMI (kg/m2): 28.3 (29 Jun 2017 06:37)  BSA (m2): 1.67 (29 Jun 2017 06:37)    MEDICATIONS  (STANDING):  sodium chloride 0.9%. 1000 milliLiter(s) (5 mL/Hr) IV Continuous <Continuous>  sodium chloride 0.9%. 1000 milliLiter(s) (10 mL/Hr) IV Continuous <Continuous>  docusate sodium 100 milliGRAM(s) Oral three times a day  milrinone Infusion 0.2 MICROgram(s)/kG/Min (4.08 mL/Hr) IV Continuous <Continuous>  DULoxetine 60 milliGRAM(s) Oral daily  atorvastatin 10 milliGRAM(s) Oral at bedtime  tiotropium 18 MICROgram(s) Capsule 1 Capsule(s) Inhalation daily  aspirin enteric coated 325 milliGRAM(s) Oral daily  digoxin     Tablet 0.125 milliGRAM(s) Oral every other day  levothyroxine 100 MICROGram(s) Oral daily  pantoprazole    Tablet 40 milliGRAM(s) Oral before breakfast  ALBUTerol    0.083% 2.5 milliGRAM(s) Nebulizer every 6 hours  enoxaparin Injectable 40 milliGRAM(s) SubCutaneous daily  midodrine 5 milliGRAM(s) Oral every 8 hours  amiodarone    Tablet 200 milliGRAM(s) Oral daily    MEDICATIONS  (PRN):  oxyCODONE IR 5 milliGRAM(s) Oral every 4 hours PRN Mild Pain (1 - 3)  oxyCODONE IR 10 milliGRAM(s) Oral every 4 hours PRN Moderate Pain (4 - 6)  benzocaine 15 mG/menthol 3.6 mG Lozenge 1 Lozenge Oral every 3 hours PRN Sore Throat    Physical Exam  Neuro:   Pulm:   CV:   Abd:   Extrem/MS:   Incision(s): Subjective: no complaints, slept well overnight     T(C): 36.6 (07-04-17 @ 16:00), Max: 36.9 (07-04-17 @ 08:00)  HR: 56 (07-05-17 @ 05:00) (56 - 69)  BP: 143/65 (07-05-17 @ 04:00) (121/60 - 143/65)  ABP: 142/55 (07-05-17 @ 05:00) (111/48 - 147/55)  ABP(mean): 81 (07-05-17 @ 05:00) (68 - 85)  RR: 12 (07-05-17 @ 05:00) (11 - 22)  SpO2: 100% (07-05-17 @ 05:00) (96% - 100%)  Wt(kg): --  CVP(mm Hg): 15 (07-04-17 @ 16:00) (7 - 22)  CO: 4.1 (07-04-17 @ 08:00) (3.7 - 4.1)  CI: 2.4 (07-04-17 @ 08:00) (2.2 - 2.4)  PA: --      07-05    133<L>  |  101  |  15.0  ----------------------------<  83  4.6   |  20.0<L>  |  0.87    Ca    7.4<L>      05 Jul 2017 04:43  Phos  2.0     07-05  Mg     2.6     07-05                                 9.4    7.5   )-----------( 181      ( 05 Jul 2017 04:43 )             28.6          ABG - ( 03 Jul 2017 20:05 )  pH: 7.40  /  pCO2: 32    /  pO2: 106   / HCO3: 20    / Base Excess: -4.7  /  SaO2: 98        CXR: PVC, small b /l pleural effusions    I&O's Detail    03 Jul 2017 07:01  -  04 Jul 2017 07:00  --------------------------------------------------------  IN:    DOBUTamine Infusion: 244.8 mL    freetext medication -  Infusion: 24 mL    milrinone  Infusion: 90.2 mL    milrinone  Infusion: 15.4 mL    Oral Fluid: 350 mL    sodium chloride 0.9%.: 240 mL    sodium chloride 0.9%.: 120 mL    Solution: 100 mL  Total IN: 1184.4 mL    OUT:    Indwelling Catheter - Urethral: 2265 mL  Total OUT: 2265 mL    Total NET: -1080.6 mL    04 Jul 2017 07:01  -  05 Jul 2017 06:02  --------------------------------------------------------  IN:    DOBUTamine Infusion: 10.2 mL    DOBUTamine Infusion: 30.6 mL    milrinone  Infusion: 90.2 mL    sodium chloride 0.9%.: 55 mL    sodium chloride 0.9%.: 110 mL  Total IN: 296 mL    OUT:    Indwelling Catheter - Urethral: 1770 mL  Total OUT: 1770 mL    Total NET: -1474 mL    Drug Dosing Weight  Height (cm): 154.94 (29 Jun 2017 06:37)  Weight (kg): 68 (29 Jun 2017 06:37)  BMI (kg/m2): 28.3 (29 Jun 2017 06:37)  BSA (m2): 1.67 (29 Jun 2017 06:37)    MEDICATIONS  (STANDING):  sodium chloride 0.9%. 1000 milliLiter(s) (5 mL/Hr) IV Continuous <Continuous>  sodium chloride 0.9%. 1000 milliLiter(s) (10 mL/Hr) IV Continuous <Continuous>  docusate sodium 100 milliGRAM(s) Oral three times a day  milrinone Infusion 0.2 MICROgram(s)/kG/Min (4.08 mL/Hr) IV Continuous <Continuous>  DULoxetine 60 milliGRAM(s) Oral daily  atorvastatin 10 milliGRAM(s) Oral at bedtime  tiotropium 18 MICROgram(s) Capsule 1 Capsule(s) Inhalation daily  aspirin enteric coated 325 milliGRAM(s) Oral daily  digoxin     Tablet 0.125 milliGRAM(s) Oral every other day  levothyroxine 100 MICROGram(s) Oral daily  pantoprazole    Tablet 40 milliGRAM(s) Oral before breakfast  ALBUTerol    0.083% 2.5 milliGRAM(s) Nebulizer every 6 hours  enoxaparin Injectable 40 milliGRAM(s) SubCutaneous daily  midodrine 5 milliGRAM(s) Oral every 8 hours  amiodarone    Tablet 200 milliGRAM(s) Oral daily    MEDICATIONS  (PRN):  oxyCODONE IR 5 milliGRAM(s) Oral every 4 hours PRN Mild Pain (1 - 3)  oxyCODONE IR 10 milliGRAM(s) Oral every 4 hours PRN Moderate Pain (4 - 6)  benzocaine 15 mG/menthol 3.6 mG Lozenge 1 Lozenge Oral every 3 hours PRN Sore Throat    Physical Exam  Neuro: A&Ox3  Pulm: decreased BS b/l  CV: S1S2 irregularly irregular  Abd: + BS soft NT ND  Extrem/MS: trace b/l LE edema  Incision(s): mid sternal inc C/D/I + staples

## 2017-07-06 ENCOUNTER — TRANSCRIPTION ENCOUNTER (OUTPATIENT)
Age: 72
End: 2017-07-06

## 2017-07-06 LAB
ANION GAP SERPL CALC-SCNC: 13 MMOL/L — SIGNIFICANT CHANGE UP (ref 5–17)
BUN SERPL-MCNC: 15 MG/DL — SIGNIFICANT CHANGE UP (ref 8–20)
CALCIUM SERPL-MCNC: 8 MG/DL — LOW (ref 8.6–10.2)
CHLORIDE SERPL-SCNC: 104 MMOL/L — SIGNIFICANT CHANGE UP (ref 98–107)
CO2 SERPL-SCNC: 18 MMOL/L — LOW (ref 22–29)
CREAT SERPL-MCNC: 0.86 MG/DL — SIGNIFICANT CHANGE UP (ref 0.5–1.3)
GLUCOSE SERPL-MCNC: 89 MG/DL — SIGNIFICANT CHANGE UP (ref 70–115)
HCT VFR BLD CALC: 31.9 % — LOW (ref 37–47)
HGB BLD-MCNC: 10.2 G/DL — LOW (ref 12–16)
MAGNESIUM SERPL-MCNC: 2.6 MG/DL — SIGNIFICANT CHANGE UP (ref 1.6–2.6)
MCHC RBC-ENTMCNC: 27.1 PG — SIGNIFICANT CHANGE UP (ref 27–31)
MCHC RBC-ENTMCNC: 32 G/DL — SIGNIFICANT CHANGE UP (ref 32–36)
MCV RBC AUTO: 84.6 FL — SIGNIFICANT CHANGE UP (ref 81–99)
PLATELET # BLD AUTO: 253 K/UL — SIGNIFICANT CHANGE UP (ref 150–400)
POTASSIUM SERPL-MCNC: 4.2 MMOL/L — SIGNIFICANT CHANGE UP (ref 3.5–5.3)
POTASSIUM SERPL-SCNC: 4.2 MMOL/L — SIGNIFICANT CHANGE UP (ref 3.5–5.3)
RBC # BLD: 3.77 M/UL — LOW (ref 4.4–5.2)
RBC # FLD: 20.7 % — HIGH (ref 11–15.6)
SODIUM SERPL-SCNC: 135 MMOL/L — SIGNIFICANT CHANGE UP (ref 135–145)
WBC # BLD: 9 K/UL — SIGNIFICANT CHANGE UP (ref 4.8–10.8)
WBC # FLD AUTO: 9 K/UL — SIGNIFICANT CHANGE UP (ref 4.8–10.8)

## 2017-07-06 PROCEDURE — 71010: CPT | Mod: 26

## 2017-07-06 RX ORDER — MAGNESIUM HYDROXIDE 400 MG/1
30 TABLET, CHEWABLE ORAL DAILY
Qty: 0 | Refills: 0 | Status: DISCONTINUED | OUTPATIENT
Start: 2017-07-06 | End: 2017-07-08

## 2017-07-06 RX ORDER — POLYETHYLENE GLYCOL 3350 17 G/17G
17 POWDER, FOR SOLUTION ORAL DAILY
Qty: 0 | Refills: 0 | Status: DISCONTINUED | OUTPATIENT
Start: 2017-07-06 | End: 2017-07-08

## 2017-07-06 RX ADMIN — SODIUM CHLORIDE 3 MILLILITER(S): 9 INJECTION INTRAMUSCULAR; INTRAVENOUS; SUBCUTANEOUS at 21:44

## 2017-07-06 RX ADMIN — PANTOPRAZOLE SODIUM 40 MILLIGRAM(S): 20 TABLET, DELAYED RELEASE ORAL at 05:12

## 2017-07-06 RX ADMIN — Medication 325 MILLIGRAM(S): at 11:08

## 2017-07-06 RX ADMIN — OXYCODONE HYDROCHLORIDE 10 MILLIGRAM(S): 5 TABLET ORAL at 13:32

## 2017-07-06 RX ADMIN — Medication 650 MILLIGRAM(S): at 05:00

## 2017-07-06 RX ADMIN — Medication 500 MILLIGRAM(S): at 11:10

## 2017-07-06 RX ADMIN — ATORVASTATIN CALCIUM 10 MILLIGRAM(S): 80 TABLET, FILM COATED ORAL at 21:48

## 2017-07-06 RX ADMIN — OXYCODONE HYDROCHLORIDE 10 MILLIGRAM(S): 5 TABLET ORAL at 21:46

## 2017-07-06 RX ADMIN — SODIUM CHLORIDE 3 MILLILITER(S): 9 INJECTION INTRAMUSCULAR; INTRAVENOUS; SUBCUTANEOUS at 11:10

## 2017-07-06 RX ADMIN — Medication 325 MILLIGRAM(S): at 11:09

## 2017-07-06 RX ADMIN — ALBUTEROL 2.5 MILLIGRAM(S): 90 AEROSOL, METERED ORAL at 08:40

## 2017-07-06 RX ADMIN — Medication 100 MICROGRAM(S): at 05:12

## 2017-07-06 RX ADMIN — Medication 100 MILLIGRAM(S): at 13:27

## 2017-07-06 RX ADMIN — ALBUTEROL 2.5 MILLIGRAM(S): 90 AEROSOL, METERED ORAL at 22:30

## 2017-07-06 RX ADMIN — ALBUTEROL 2.5 MILLIGRAM(S): 90 AEROSOL, METERED ORAL at 03:07

## 2017-07-06 RX ADMIN — DULOXETINE HYDROCHLORIDE 60 MILLIGRAM(S): 30 CAPSULE, DELAYED RELEASE ORAL at 11:09

## 2017-07-06 RX ADMIN — ENOXAPARIN SODIUM 40 MILLIGRAM(S): 100 INJECTION SUBCUTANEOUS at 11:09

## 2017-07-06 RX ADMIN — Medication 650 MILLIGRAM(S): at 03:59

## 2017-07-06 RX ADMIN — SODIUM CHLORIDE 3 MILLILITER(S): 9 INJECTION INTRAMUSCULAR; INTRAVENOUS; SUBCUTANEOUS at 05:06

## 2017-07-06 RX ADMIN — Medication 100 MILLIGRAM(S): at 21:48

## 2017-07-06 RX ADMIN — TIOTROPIUM BROMIDE 1 CAPSULE(S): 18 CAPSULE ORAL; RESPIRATORY (INHALATION) at 08:40

## 2017-07-06 RX ADMIN — Medication 100 MILLIGRAM(S): at 05:15

## 2017-07-06 RX ADMIN — AMIODARONE HYDROCHLORIDE 200 MILLIGRAM(S): 400 TABLET ORAL at 05:12

## 2017-07-06 RX ADMIN — Medication 1 MILLIGRAM(S): at 11:09

## 2017-07-06 RX ADMIN — POLYETHYLENE GLYCOL 3350 17 GRAM(S): 17 POWDER, FOR SOLUTION ORAL at 13:27

## 2017-07-06 RX ADMIN — OXYCODONE HYDROCHLORIDE 10 MILLIGRAM(S): 5 TABLET ORAL at 22:46

## 2017-07-06 NOTE — DISCHARGE NOTE ADULT - CARE PROVIDER_API CALL
Mert Padron), Surgery; Thoracic and Cardiac Surgery  [address] [place] [address]  Phone: [phone]  Fax: [phone]    Hope Banks), Cardiovascular Disease; Internal Medicine  [address] [place] [id number]  Phone: [phone]  Fax: [phone]

## 2017-07-06 NOTE — DISCHARGE NOTE ADULT - CARE PROVIDERS DIRECT ADDRESSES
[email or web address]

## 2017-07-06 NOTE — DISCHARGE NOTE ADULT - MEDICATION SUMMARY - MEDICATIONS TO CHANGE
I will SWITCH the dose or number of times a day I take the medications listed below when I get home from the hospital:    Coreg 6.25 mg oral tablet  -- 1 tab(s) by mouth 2 times a day    Lasix  -- 80 milligram(s) by mouth once a day

## 2017-07-06 NOTE — DISCHARGE NOTE ADULT - MEDICATION SUMMARY - MEDICATIONS TO TAKE
I will START or STAY ON the medications listed below when I get home from the hospital:    spironolactone 25 mg oral tablet  -- 2 tab(s) by mouth once a day  -- Indication: For Potassium sparing diuretic for heart failure/fluid overload    aspirin 325 mg oral delayed release tablet  -- 1 tab(s) by mouth once a day  -- Indication: For Mitral valve repair    oxyCODONE 5 mg oral tablet  -- 1 tab(s) by mouth every 6 hours MDD:4  -- Caution federal law prohibits the transfer of this drug to any person other  than the person for whom it was prescribed.  It is very important that you take or use this exactly as directed.  Do not skip doses or discontinue unless directed by your doctor.  May cause drowsiness.  Alcohol may intensify this effect.  Use care when operating dangerous machinery.  This prescription cannot be refilled.  Using more of this medication than prescribed may cause serious breathing problems.    -- Indication: For Pain, acute postoperative    acetaminophen 325 mg oral tablet  -- 2 tab(s) by mouth every 6 hours, As needed, pain  -- Indication: For Pain, acute postoperative    magnesium hydroxide 8% oral suspension  -- 30 milliliter(s) by mouth once a day, As needed, Constipation  -- Indication: For Other constipation    amiodarone 200 mg oral tablet  -- 1 tab(s) by mouth once a day  -- Indication: For Atrial fibrillation    DULoxetine 60 mg oral delayed release capsule  -- 1 cap(s) by mouth once a day  -- Indication: For depression    Lipitor 10 mg oral tablet  -- 1 tab(s) by mouth once a day (at bedtime)  -- Indication: For Hyperlipidemia    carvedilol 3.125 mg oral tablet  -- 1 tab(s) by mouth every 12 hours  -- Indication: For Acute on chronic systolic heart failure    tiotropium 18 mcg inhalation capsule  -- 1 cap(s) inhaled once a day  -- Indication: For Simple chronic bronchitis    furosemide 40 mg oral tablet  -- 1 tab(s) by mouth once a day  -- Indication: For Acute on chronic systolic heart failure    polyethylene glycol 3350 oral powder for reconstitution  -- 17 gram(s) by mouth once a day  -- Indication: For Other constipation    Colace 100 mg oral capsule  -- 2 cap(s) by mouth once a day  -- Indication: For Other constipation    pantoprazole 40 mg oral delayed release tablet  -- 1 tab(s) by mouth once a day (before a meal)  -- Indication: For Acid reflux    Synthroid 100 mcg (0.1 mg) oral tablet  -- 1 tab(s) by mouth once a day  -- Indication: For Hypothyroidism, unspecified type    Vitamin D3 2000 intl units oral capsule  -- 1 cap(s) by mouth once a day  -- Indication: For vitamin    Vitamin B-12 1000 mcg/mL injectable solution  -- 1 milliliter(s) injectable every other week  -- Indication: For vitamin

## 2017-07-06 NOTE — DISCHARGE NOTE ADULT - PATIENT PORTAL LINK FT
“You can access the [employer] Patient Portal, offered by [facility], by registering with the following website: [email or web address]”

## 2017-07-06 NOTE — DISCHARGE NOTE ADULT - CARE PLAN
Principal Discharge DX:	Non-rheumatic mitral regurgitation  Goal:	Recover from surgery  Instructions for follow-up, activity and diet:	Please follow up with Dr. Padron in 2 weeks. Call the office for an appointment on Monday. [phone]  Please follow up with your Cardiologist and Primary Care Physician 2-4 weeks from discharge.    1. Take ALL of your medications as ordered. Fill your prescriptions the day you are discharged and take according to the schedule you were given. Continue to take a stool softener if you are taking narcotic pain medications. AVOID medications such as ibuprofen or naproxen if you have had bypass surgery. If you have any questions or are unable to fill the prescriptions, please call the office right away at [phone].  2. Shower daily. Clean all incisions daily while showering with warm water and mild soap, pat dry with a clean towel and do not cover with any dressings unless instructed to. No bathing, swimming in a pool or the ocean until instructed by MD.  DO NOT use creams or lotions on the wound.  3. We advise that you do not drive until instructed by MD.   4. You may not return to work until instructed by MD.   5. Please eat a low fat, low cholesterol, low salt diet. (No added/extra salt)  6. Weigh yourself every day in the morning and record it in the weight log in your red folder. Notify the office of any weight gain more than 2-3 pounds in 24 hours.  **Please LIMIT your fluid intake to less than a liter a day.**  7. Continue breathing exercises several times a day. Continue to use your heart pillow.  8. No heavy lifting nothing greater than 5 pounds until cleared by MD.   9. Call / Notify MD any fever greater than 101.0, any drainage from incisions or if they become red, hot or very tender to the touch.  10. Increase activity as tolerated. Walk indoors and/or outdoors at least 3 times a day.  11. Keep surgical or sports bra on 24 hours a day for at least 2 weeks. Principal Discharge DX:	Non-rheumatic mitral regurgitation  Goal:	Recover from surgery  Instructions for follow-up, activity and diet:	Please follow up with Dr. Padron in 2 weeks. Call the office for an appointment on Monday. [phone]  Please follow up with your Cardiologist and Primary Care Physician 2-4 weeks from discharge.    1. Take ALL of your medications as ordered. Fill your prescriptions the day you are discharged and take according to the schedule you were given. Continue to take a stool softener if you are taking narcotic pain medications. AVOID medications such as ibuprofen or naproxen if you have had bypass surgery. If you have any questions or are unable to fill the prescriptions, please call the office right away at [phone].  2. Shower daily. Clean all incisions daily while showering with warm water and mild soap, pat dry with a clean towel and do not cover with any dressings unless instructed to. No bathing, swimming in a pool or the ocean until instructed by MD.  DO NOT use creams or lotions on the wound.  3. We advise that you do not drive until instructed by MD.   4. You may not return to work until instructed by MD.   5. Please eat a low fat, low cholesterol, low salt diet. (No added/extra salt)  6. Weigh yourself every day in the morning and record it in the weight log in your red folder. Notify the office of any weight gain more than 2-3 pounds in 24 hours.  **Please LIMIT your fluid intake to less than a liter a day.**  7. Continue breathing exercises several times a day. Continue to use your heart pillow.  8. No heavy lifting nothing greater than 5 pounds until cleared by MD.   9. Call / Notify MD any fever greater than 101.0, any drainage from incisions or if they become red, hot or very tender to the touch.  10. Increase activity as tolerated. Walk indoors and/or outdoors at least 3 times a day.  11. Keep surgical or sports bra on 24 hours a day for at least 2 weeks. Principal Discharge DX:	Non-rheumatic mitral regurgitation  Goal:	Recover from surgery  Instructions for follow-up, activity and diet:	Please follow up with Dr. Padron in 2 weeks. Call the office for an appointment on Monday. [phone]  Please follow up with your Cardiologist and Primary Care Physician 2-4 weeks from discharge.    1. Take ALL of your medications as ordered. Fill your prescriptions the day you are discharged and take according to the schedule you were given. Continue to take a stool softener if you are taking narcotic pain medications. AVOID medications such as ibuprofen or naproxen if you have had bypass surgery. If you have any questions or are unable to fill the prescriptions, please call the office right away at [phone].  2. Shower daily. Clean all incisions daily while showering with warm water and mild soap, pat dry with a clean towel and do not cover with any dressings unless instructed to. No bathing, swimming in a pool or the ocean until instructed by MD.  DO NOT use creams or lotions on the wound.  3. We advise that you do not drive until instructed by MD.   4. You may not return to work until instructed by MD.   5. Please eat a low fat, low cholesterol, low salt diet. (No added/extra salt)  6. Weigh yourself every day in the morning and record it in the weight log in your red folder. Notify the office of any weight gain more than 2-3 pounds in 24 hours.  **Please LIMIT your fluid intake to less than a liter a day.**  7. Continue breathing exercises several times a day. Continue to use your heart pillow.  8. No heavy lifting nothing greater than 5 pounds until cleared by MD.   9. Call / Notify MD any fever greater than 101.0, any drainage from incisions or if they become red, hot or very tender to the touch.  10. Increase activity as tolerated. Walk indoors and/or outdoors at least 3 times a day.  11. Keep surgical or sports bra on 24 hours a day for at least 2 weeks.

## 2017-07-06 NOTE — DISCHARGE NOTE ADULT - MEDICATION SUMMARY - MEDICATIONS TO STOP TAKING
I will STOP taking the medications listed below when I get home from the hospital:    Norvasc 5 mg oral tablet  -- 1 tab(s) by mouth once a day    potassium chloride 20 mEq oral granule, extended release  --  by mouth once a day

## 2017-07-06 NOTE — DISCHARGE NOTE ADULT - ADDITIONAL INSTRUCTIONS
Please call the Cardiothoracic Surgery office at [phone] if you are experiencing any shortness of breath, chest pain, fevers or chills, drainage from the incisions, persistent nausea, vomiting or if you have any questions about your medications. If the symptoms are severe, call 911 and go to the nearest hospital. You can also call [phone] for an emergency [facility] ambulance, which will take you to the closest [facility].

## 2017-07-06 NOTE — DISCHARGE NOTE ADULT - NS AS ACTIVITY OBS
Do not make important decisions/No Heavy lifting/straining/Stairs allowed/Walking-Outdoors allowed/Showering allowed/Walking-Indoors allowed/Do not drive or operate machinery

## 2017-07-06 NOTE — DISCHARGE NOTE ADULT - PLAN OF CARE
Recover from surgery Please follow up with Dr. Padron in 2 weeks. Call the office for an appointment on Monday. [phone]  Please follow up with your Cardiologist and Primary Care Physician 2-4 weeks from discharge.    1. Take ALL of your medications as ordered. Fill your prescriptions the day you are discharged and take according to the schedule you were given. Continue to take a stool softener if you are taking narcotic pain medications. AVOID medications such as ibuprofen or naproxen if you have had bypass surgery. If you have any questions or are unable to fill the prescriptions, please call the office right away at [phone].  2. Shower daily. Clean all incisions daily while showering with warm water and mild soap, pat dry with a clean towel and do not cover with any dressings unless instructed to. No bathing, swimming in a pool or the ocean until instructed by MD.  DO NOT use creams or lotions on the wound.  3. We advise that you do not drive until instructed by MD.   4. You may not return to work until instructed by MD.   5. Please eat a low fat, low cholesterol, low salt diet. (No added/extra salt)  6. Weigh yourself every day in the morning and record it in the weight log in your red folder. Notify the office of any weight gain more than 2-3 pounds in 24 hours.  **Please LIMIT your fluid intake to less than a liter a day.**  7. Continue breathing exercises several times a day. Continue to use your heart pillow.  8. No heavy lifting nothing greater than 5 pounds until cleared by MD.   9. Call / Notify MD any fever greater than 101.0, any drainage from incisions or if they become red, hot or very tender to the touch.  10. Increase activity as tolerated. Walk indoors and/or outdoors at least 3 times a day.  11. Keep surgical or sports bra on 24 hours a day for at least 2 weeks.

## 2017-07-06 NOTE — DISCHARGE NOTE ADULT - HOSPITAL COURSE
71F, pmhx NICM, chronic systoilc CHF, s/p AICD, afib, anemia(baseline hb 9), hx gastric bypass, GI bleed, scleroderma, OA, hypothyroidism, GERD    Preop: progressive SOB revealed MR/TR    6/29 s/p Mitral valve repair, tricuspid valve repair requiring intraop IABP for biventricular failure/cardiogenic shock    Postop course: 7/1 25 beats NSVT 7/2 IABP removed, inotropes slowly weaned to off. 7/5 PPM rate increased to 70 7/8 ? inappropriate pacing: interrogated PPM by [facility] showed normal function, underlying AF. Echo reveals improved EF

## 2017-07-06 NOTE — DISCHARGE NOTE ADULT - NS AS DC HF EDUCATION INSTRUCTIONS
Call Primary Care Provider for follow-up after discharge/Activities as tolerated/Monitor Weight Daily/Report weight gain of 2 or more pounds in one day or 3 or more pounds in one week, worsening shortness of breath, fatigue, weakness, increased swelling of hands and feet to primary care provider/Low salt diet

## 2017-07-07 DIAGNOSIS — K59.09 OTHER CONSTIPATION: ICD-10-CM

## 2017-07-07 DIAGNOSIS — I50.9 HEART FAILURE, UNSPECIFIED: ICD-10-CM

## 2017-07-07 DIAGNOSIS — R60.0 LOCALIZED EDEMA: ICD-10-CM

## 2017-07-07 LAB
ANION GAP SERPL CALC-SCNC: 12 MMOL/L — SIGNIFICANT CHANGE UP (ref 5–17)
BUN SERPL-MCNC: 14 MG/DL — SIGNIFICANT CHANGE UP (ref 8–20)
CALCIUM SERPL-MCNC: 8.5 MG/DL — LOW (ref 8.6–10.2)
CHLORIDE SERPL-SCNC: 105 MMOL/L — SIGNIFICANT CHANGE UP (ref 98–107)
CO2 SERPL-SCNC: 20 MMOL/L — LOW (ref 22–29)
CREAT SERPL-MCNC: 0.92 MG/DL — SIGNIFICANT CHANGE UP (ref 0.5–1.3)
GLUCOSE SERPL-MCNC: 82 MG/DL — SIGNIFICANT CHANGE UP (ref 70–115)
HCT VFR BLD CALC: 31.9 % — LOW (ref 37–47)
HGB BLD-MCNC: 10.3 G/DL — LOW (ref 12–16)
MAGNESIUM SERPL-MCNC: 2.5 MG/DL — SIGNIFICANT CHANGE UP (ref 1.6–2.6)
MCHC RBC-ENTMCNC: 27.2 PG — SIGNIFICANT CHANGE UP (ref 27–31)
MCHC RBC-ENTMCNC: 32.3 G/DL — SIGNIFICANT CHANGE UP (ref 32–36)
MCV RBC AUTO: 84.2 FL — SIGNIFICANT CHANGE UP (ref 81–99)
PLATELET # BLD AUTO: 301 K/UL — SIGNIFICANT CHANGE UP (ref 150–400)
POTASSIUM SERPL-MCNC: 4.4 MMOL/L — SIGNIFICANT CHANGE UP (ref 3.5–5.3)
POTASSIUM SERPL-SCNC: 4.4 MMOL/L — SIGNIFICANT CHANGE UP (ref 3.5–5.3)
RBC # BLD: 3.79 M/UL — LOW (ref 4.4–5.2)
RBC # FLD: 20.2 % — HIGH (ref 11–15.6)
SODIUM SERPL-SCNC: 137 MMOL/L — SIGNIFICANT CHANGE UP (ref 135–145)
WBC # BLD: 7.4 K/UL — SIGNIFICANT CHANGE UP (ref 4.8–10.8)
WBC # FLD AUTO: 7.4 K/UL — SIGNIFICANT CHANGE UP (ref 4.8–10.8)

## 2017-07-07 PROCEDURE — 71010: CPT | Mod: 26

## 2017-07-07 PROCEDURE — 93010 ELECTROCARDIOGRAM REPORT: CPT

## 2017-07-07 RX ORDER — FUROSEMIDE 40 MG
40 TABLET ORAL ONCE
Qty: 0 | Refills: 0 | Status: COMPLETED | OUTPATIENT
Start: 2017-07-07 | End: 2017-07-07

## 2017-07-07 RX ORDER — MULTIVIT WITH MIN/MFOLATE/K2 340-15/3 G
240 POWDER (GRAM) ORAL DAILY
Qty: 0 | Refills: 0 | Status: DISCONTINUED | OUTPATIENT
Start: 2017-07-07 | End: 2017-07-08

## 2017-07-07 RX ORDER — SORBITOL SOLUTION 70 %
30 SOLUTION, ORAL MISCELLANEOUS ONCE
Qty: 0 | Refills: 0 | Status: COMPLETED | OUTPATIENT
Start: 2017-07-07 | End: 2017-07-07

## 2017-07-07 RX ORDER — FUROSEMIDE 40 MG
40 TABLET ORAL DAILY
Qty: 0 | Refills: 0 | Status: DISCONTINUED | OUTPATIENT
Start: 2017-07-07 | End: 2017-07-08

## 2017-07-07 RX ORDER — SPIRONOLACTONE 25 MG/1
50 TABLET, FILM COATED ORAL DAILY
Qty: 0 | Refills: 0 | Status: DISCONTINUED | OUTPATIENT
Start: 2017-07-07 | End: 2017-07-08

## 2017-07-07 RX ORDER — CARVEDILOL PHOSPHATE 80 MG/1
3.12 CAPSULE, EXTENDED RELEASE ORAL EVERY 12 HOURS
Qty: 0 | Refills: 0 | Status: DISCONTINUED | OUTPATIENT
Start: 2017-07-07 | End: 2017-07-08

## 2017-07-07 RX ADMIN — SODIUM CHLORIDE 3 MILLILITER(S): 9 INJECTION INTRAMUSCULAR; INTRAVENOUS; SUBCUTANEOUS at 05:42

## 2017-07-07 RX ADMIN — Medication 30 MILLILITER(S): at 08:49

## 2017-07-07 RX ADMIN — ENOXAPARIN SODIUM 40 MILLIGRAM(S): 100 INJECTION SUBCUTANEOUS at 11:44

## 2017-07-07 RX ADMIN — Medication 100 MICROGRAM(S): at 05:39

## 2017-07-07 RX ADMIN — ALBUTEROL 2.5 MILLIGRAM(S): 90 AEROSOL, METERED ORAL at 09:55

## 2017-07-07 RX ADMIN — AMIODARONE HYDROCHLORIDE 200 MILLIGRAM(S): 400 TABLET ORAL at 05:39

## 2017-07-07 RX ADMIN — Medication 10 MILLIGRAM(S): at 12:38

## 2017-07-07 RX ADMIN — SPIRONOLACTONE 50 MILLIGRAM(S): 25 TABLET, FILM COATED ORAL at 09:46

## 2017-07-07 RX ADMIN — OXYCODONE HYDROCHLORIDE 10 MILLIGRAM(S): 5 TABLET ORAL at 22:06

## 2017-07-07 RX ADMIN — POLYETHYLENE GLYCOL 3350 17 GRAM(S): 17 POWDER, FOR SOLUTION ORAL at 11:35

## 2017-07-07 RX ADMIN — CARVEDILOL PHOSPHATE 3.12 MILLIGRAM(S): 80 CAPSULE, EXTENDED RELEASE ORAL at 12:37

## 2017-07-07 RX ADMIN — SODIUM CHLORIDE 3 MILLILITER(S): 9 INJECTION INTRAMUSCULAR; INTRAVENOUS; SUBCUTANEOUS at 11:32

## 2017-07-07 RX ADMIN — ALBUTEROL 2.5 MILLIGRAM(S): 90 AEROSOL, METERED ORAL at 15:55

## 2017-07-07 RX ADMIN — Medication 325 MILLIGRAM(S): at 11:44

## 2017-07-07 RX ADMIN — OXYCODONE HYDROCHLORIDE 10 MILLIGRAM(S): 5 TABLET ORAL at 11:34

## 2017-07-07 RX ADMIN — PANTOPRAZOLE SODIUM 40 MILLIGRAM(S): 20 TABLET, DELAYED RELEASE ORAL at 05:39

## 2017-07-07 RX ADMIN — TIOTROPIUM BROMIDE 1 CAPSULE(S): 18 CAPSULE ORAL; RESPIRATORY (INHALATION) at 08:53

## 2017-07-07 RX ADMIN — DULOXETINE HYDROCHLORIDE 60 MILLIGRAM(S): 30 CAPSULE, DELAYED RELEASE ORAL at 12:37

## 2017-07-07 RX ADMIN — Medication 100 MILLIGRAM(S): at 05:39

## 2017-07-07 RX ADMIN — OXYCODONE HYDROCHLORIDE 10 MILLIGRAM(S): 5 TABLET ORAL at 12:34

## 2017-07-07 RX ADMIN — Medication 40 MILLIGRAM(S): at 10:38

## 2017-07-07 RX ADMIN — Medication 100 MILLIGRAM(S): at 12:39

## 2017-07-07 RX ADMIN — ATORVASTATIN CALCIUM 10 MILLIGRAM(S): 80 TABLET, FILM COATED ORAL at 22:05

## 2017-07-07 RX ADMIN — OXYCODONE HYDROCHLORIDE 10 MILLIGRAM(S): 5 TABLET ORAL at 22:45

## 2017-07-07 RX ADMIN — Medication 240 MILLILITER(S): at 17:59

## 2017-07-07 RX ADMIN — SODIUM CHLORIDE 3 MILLILITER(S): 9 INJECTION INTRAMUSCULAR; INTRAVENOUS; SUBCUTANEOUS at 21:10

## 2017-07-07 RX ADMIN — ALBUTEROL 2.5 MILLIGRAM(S): 90 AEROSOL, METERED ORAL at 21:30

## 2017-07-07 NOTE — PROGRESS NOTE ADULT - SUBJECTIVE AND OBJECTIVE BOX
Subjective:  "Hi..pain is better...I have not had a BM since the morning of my surgery"  OOB chair      Tele:  AV  Pace 60           V/S:                    T(F): 98.3 (17 @ 09:30), Max: 99.2 (17 @ 15:19)  HR: 74 (17 @ 09:30) (72 - 76)  BP: 110/62 (17 @ 09:30) (110/62 - 141/79)  RR: 18 (17 @ 09:30) (18 - 18)  SpO2: 95% (17 @ 09:30) (93% - 98%)       LV EF:  25%      Labs:      137  |  105  |  14.0  ----------------------------<  82  4.4   |  20.0<L>  |  0.92    Ca    8.5<L>      2017 05:56  Mg     2.5                                      10.3   7.4   )-----------( 301      ( 2017 05:56 )             31.9           CXR:    The lungs  show residual left retrocardiac airspace consolidation and   small effusion. Remaining lung parenchyma clear.  The heart and mediastinum are within normal limits following open heart   surgery.   Cardiac device wire leads are within right atrium and right ventricle.   A lower thoracic spinal cord stimulator wire in place.  Visualized osseous structures are intact.    I&O's Detail    2017 07:01  -  2017 07:00  --------------------------------------------------------  IN:    Oral Fluid: 460 mL  Total IN: 460 mL    OUT:    Voided: 400 mL  Total OUT: 400 mL    Total NET: 60 mL      2017 07:01  -  2017 12:07  --------------------------------------------------------  IN:  Total IN: 0 mL    OUT:    Voided: 450 mL  Total OUT: 450 mL    Total NET: -450 mL          CHEST TUBE:  [ ] YES [x ] NO  OUTPUT:     per 24 hours    AIR LEAKS:  [ ] YES [ ] NO      ZACK DRAIN:   [ ] YES [x ] NO  OUTPUT:     per 24 hours    EPICARDIAL WIRES:  [ ] YES [x ] NO      BOWEL MOVEMENT:  [ ] YES [x ] NO   Last reported BM  as per pt      Daily Weight in k.5 (2017 05:00)  Medications:  docusate sodium 100 milliGRAM(s) Oral three times a day  DULoxetine 60 milliGRAM(s) Oral daily  atorvastatin 10 milliGRAM(s) Oral at bedtime  tiotropium 18 MICROgram(s) Capsule 1 Capsule(s) Inhalation daily  aspirin enteric coated 325 milliGRAM(s) Oral daily  oxyCODONE IR 5 milliGRAM(s) Oral every 4 hours PRN  oxyCODONE IR 10 milliGRAM(s) Oral every 4 hours PRN  levothyroxine 100 MICROGram(s) Oral daily  pantoprazole    Tablet 40 milliGRAM(s) Oral before breakfast  benzocaine 15 mG/menthol 3.6 mG Lozenge 1 Lozenge Oral every 3 hours PRN  ALBUTerol    0.083% 2.5 milliGRAM(s) Nebulizer every 6 hours  enoxaparin Injectable 40 milliGRAM(s) SubCutaneous daily  amiodarone    Tablet 200 milliGRAM(s) Oral daily  sodium chloride 0.9% lock flush 3 milliLiter(s) IV Push every 8 hours  acetaminophen   Tablet. 650 milliGRAM(s) Oral every 6 hours PRN  polyethylene glycol 3350 17 Gram(s) Oral daily  magnesium hydroxide Suspension 30 milliLiter(s) Oral daily PRN  bisacodyl Suppository 10 milliGRAM(s) Rectal daily PRN  carvedilol 3.125 milliGRAM(s) Oral every 12 hours  spironolactone 50 milliGRAM(s) Oral daily        Physical Exam:    Neuro: alert, pleasant no impairment    Pulm: diminshed at bases, demonstrates proper use incentive spirometry    CV: S1 S2  RRR    Abd: sl distended  hypoactive BS  passing flatus    Extremities:  2+ edema B/L  lower extrem    Incision(s): sternum stable, incision clean, prox pole w/ scab                 PAST MEDICAL & SURGICAL HISTORY:  B12 deficiency anemia  COPD (chronic obstructive pulmonary disease)  Arthritis  Ejection fraction < 50%  CHF (congestive heart failure): last episode  2017   at  [facility], treated with diuretcss  MR (mitral regurgitation): moderate to severe  Anemia  Ventricular tachycardia  Cardiomyopathy  Scleroderma  Chronic back pain  Sleep apnea: uses oral plate  Raynaud's disease  GI bleed not requiring more than 4 units of blood in 24 hours, ICU, or surgery  Hypothyroid  Afib  MI (myocardial infarction): 3 times (,)  HLD (hyperlipidemia)  HTN (hypertension)  Abnormality of left atrial appendage: s/p Lariat   History of breast surgery: &quot;Lift&quot;  Spinal cord stimulator status: [employer] () recent battery change  AICD (automatic cardioverter/defibrillator) present: [employer] [facility] ()  Hernia: x2  Bariatric surgery status: gastric bypass

## 2017-07-07 NOTE — PROGRESS NOTE ADULT - SUBJECTIVE AND OBJECTIVE BOX
[facility] - [facility], THE [facility]                                   [address] [place] [address]                                                      PHONE: [phone]                                                         FAX: [phone]  [email or web address]  ---------------------------------------------------------------------------------------------------------------------------------    Overnight events/patient complaints:  NAD     Keflex (Unknown)  [facility] PT (Unknown)    MEDICATIONS  (STANDING):  docusate sodium 100 milliGRAM(s) Oral three times a day  DULoxetine 60 milliGRAM(s) Oral daily  atorvastatin 10 milliGRAM(s) Oral at bedtime  tiotropium 18 MICROgram(s) Capsule 1 Capsule(s) Inhalation daily  aspirin enteric coated 325 milliGRAM(s) Oral daily  levothyroxine 100 MICROGram(s) Oral daily  pantoprazole    Tablet 40 milliGRAM(s) Oral before breakfast  ALBUTerol    0.083% 2.5 milliGRAM(s) Nebulizer every 6 hours  enoxaparin Injectable 40 milliGRAM(s) SubCutaneous daily  amiodarone    Tablet 200 milliGRAM(s) Oral daily  sodium chloride 0.9% lock flush 3 milliLiter(s) IV Push every 8 hours  polyethylene glycol 3350 17 Gram(s) Oral daily  carvedilol 3.125 milliGRAM(s) Oral every 12 hours  spironolactone 50 milliGRAM(s) Oral daily    MEDICATIONS  (PRN):  oxyCODONE IR 5 milliGRAM(s) Oral every 4 hours PRN Mild Pain (1 - 3)  oxyCODONE IR 10 milliGRAM(s) Oral every 4 hours PRN Moderate Pain (4 - 6)  benzocaine 15 mG/menthol 3.6 mG Lozenge 1 Lozenge Oral every 3 hours PRN Sore Throat  acetaminophen   Tablet. 650 milliGRAM(s) Oral every 6 hours PRN pain  magnesium hydroxide Suspension 30 milliLiter(s) Oral daily PRN Constipation  bisacodyl Suppository 10 milliGRAM(s) Rectal daily PRN Constipation      Vital Signs Last 24 Hrs  T(C): 36.8 (07 Jul 2017 09:30), Max: 37.3 (06 Jul 2017 15:19)  T(F): 98.3 (07 Jul 2017 09:30), Max: 99.2 (06 Jul 2017 15:19)  HR: 74 (07 Jul 2017 09:30) (72 - 76)  BP: 110/62 (07 Jul 2017 09:30) (110/62 - 141/79)  BP(mean): --  RR: 18 (07 Jul 2017 09:30) (18 - 18)  SpO2: 95% (07 Jul 2017 09:30) (93% - 98%)  ICU Vital Signs Last 24 Hrs  MARGARITO FRIEND  I&O's Detail    06 Jul 2017 07:01  -  07 Jul 2017 07:00  --------------------------------------------------------  IN:    Oral Fluid: 460 mL  Total IN: 460 mL    OUT:    Voided: 400 mL  Total OUT: 400 mL    Total NET: 60 mL      07 Jul 2017 07:01  -  07 Jul 2017 11:43  --------------------------------------------------------  IN:  Total IN: 0 mL    OUT:    Voided: 250 mL  Total OUT: 250 mL    Total NET: -250 mL        I&O's Summary    06 Jul 2017 07:01  -  07 Jul 2017 07:00  --------------------------------------------------------  IN: 460 mL / OUT: 400 mL / NET: 60 mL    07 Jul 2017 07:01  -  07 Jul 2017 11:43  --------------------------------------------------------  IN: 0 mL / OUT: 250 mL / NET: -250 mL      Drug Dosing Weight  MARGARITO FRIEND      PHYSICAL EXAM:  General: Appears well developed, well nourished alert and cooperative.  HEENT: Head; normocephalic, atraumatic.  Eyes: Pupils reactive, cornea wnl.  Neck: Supple, no nodes adenopathy, no NVD or carotid bruit or thyromegaly.  CARDIOVASCULAR: Normal S1 and S2, 3/6 murmur, rub, gallop or lift.   LUNGS: Decrease BS   ABDOMEN: Soft, nontender without mass or organomegaly. bowel sounds normoactive.  EXTREMITIES: No clubbing, cyanosis ++ edema. Distal pulses wnl.   SKIN: warm and dry with normal turgor.  NEURO: Alert/oriented x 3/normal motor exam. No pathologic reflexes.    PSYCH: normal affect.        LABS:                        10.3   7.4   )-----------( 301      ( 07 Jul 2017 05:56 )             31.9     07-07    137  |  105  |  14.0  ----------------------------<  82  4.4   |  20.0<L>  |  0.92    Ca    8.5<L>      07 Jul 2017 05:56  Mg     2.5     07-07      MARGARITO FRIEND            RADIOLOGY & ADDITIONAL STUDIES:    INTERPRETATION OF TELEMETRY (personally reviewed):    ECG:    ECHO:   Summary:   1. Left ventricular ejection fraction, by visual estimation, is 40 to   45%.   2. Elevated mean left atrial pressure.   3. The left ventricular diastolic function could not be assessed in this   study.   4. S/p Lariat without visible appendage or thrombus.   5. Severely dilated right atrium.   6. Severe mitral valve regurgitation.   7. Thickening of the anterior and posterior mitral valve leaflets.   8. Severe MR by color flow mapping, PISA ERO 0.36 cm2 and vena contracta   0.7 cm.   9. Severetricuspid regurgitation.  10. Small patent foramen ovale, with bidirectional shunting across atrial   septum.  11. Color Doppler demonstrates the presence of a shunt at the Atrial   level.  12. Narrow tunneled PFO with bidirectional shunting on colorflow,   confirmed by contrast and 3D imaging.  13. Severely enlarged left atrium.      CARDIAC CATHETERIZATION:  INTERVENTIONAL IMPRESSIONS: Moderate pulmonary HTN.  Preserved EF w/ severe MR.  No significant CAD.  Hx back injury/has a spinal stimulator.  INTERVENTIONAL RECOMMENDATIONS: Outpt evaluation for mitraclip.      ASSESSMENT AND PLAN:  In summary, MARGARITO FRIEND is an 71y Female with past medical history significant for NICM AICD PAF anemia GIB scleroderma s/p MR/TR repair HFrEF    Plan  1.  Awaiting TTE   2.  Agree with Aldactone and Lasix   3.  Agree with Coreg   4.  Care as per CT surgery

## 2017-07-08 VITALS
DIASTOLIC BLOOD PRESSURE: 58 MMHG | OXYGEN SATURATION: 100 % | RESPIRATION RATE: 18 BRPM | SYSTOLIC BLOOD PRESSURE: 102 MMHG | HEART RATE: 73 BPM

## 2017-07-08 DIAGNOSIS — G89.18 OTHER ACUTE POSTPROCEDURAL PAIN: ICD-10-CM

## 2017-07-08 LAB
ALBUMIN SERPL ELPH-MCNC: 2.8 G/DL — LOW (ref 3.3–5.2)
ALP SERPL-CCNC: 55 U/L — SIGNIFICANT CHANGE UP (ref 40–120)
ALT FLD-CCNC: 15 U/L — SIGNIFICANT CHANGE UP
ANION GAP SERPL CALC-SCNC: 14 MMOL/L — SIGNIFICANT CHANGE UP (ref 5–17)
AST SERPL-CCNC: 21 U/L — SIGNIFICANT CHANGE UP
BILIRUB SERPL-MCNC: 0.6 MG/DL — SIGNIFICANT CHANGE UP (ref 0.4–2)
BUN SERPL-MCNC: 15 MG/DL — SIGNIFICANT CHANGE UP (ref 8–20)
CALCIUM SERPL-MCNC: 9 MG/DL — SIGNIFICANT CHANGE UP (ref 8.6–10.2)
CHLORIDE SERPL-SCNC: 100 MMOL/L — SIGNIFICANT CHANGE UP (ref 98–107)
CO2 SERPL-SCNC: 23 MMOL/L — SIGNIFICANT CHANGE UP (ref 22–29)
CREAT SERPL-MCNC: 1.04 MG/DL — SIGNIFICANT CHANGE UP (ref 0.5–1.3)
GLUCOSE SERPL-MCNC: 74 MG/DL — SIGNIFICANT CHANGE UP (ref 70–115)
HCT VFR BLD CALC: 34 % — LOW (ref 37–47)
HGB BLD-MCNC: 11.2 G/DL — LOW (ref 12–16)
MAGNESIUM SERPL-MCNC: 2.5 MG/DL — SIGNIFICANT CHANGE UP (ref 1.6–2.6)
MCHC RBC-ENTMCNC: 27.7 PG — SIGNIFICANT CHANGE UP (ref 27–31)
MCHC RBC-ENTMCNC: 32.9 G/DL — SIGNIFICANT CHANGE UP (ref 32–36)
MCV RBC AUTO: 84.2 FL — SIGNIFICANT CHANGE UP (ref 81–99)
PLATELET # BLD AUTO: 308 K/UL — SIGNIFICANT CHANGE UP (ref 150–400)
POTASSIUM SERPL-MCNC: 4.4 MMOL/L — SIGNIFICANT CHANGE UP (ref 3.5–5.3)
POTASSIUM SERPL-SCNC: 4.4 MMOL/L — SIGNIFICANT CHANGE UP (ref 3.5–5.3)
PROT SERPL-MCNC: 5.8 G/DL — LOW (ref 6.6–8.7)
RBC # BLD: 4.04 M/UL — LOW (ref 4.4–5.2)
RBC # FLD: 20.3 % — HIGH (ref 11–15.6)
SODIUM SERPL-SCNC: 137 MMOL/L — SIGNIFICANT CHANGE UP (ref 135–145)
WBC # BLD: 9.2 K/UL — SIGNIFICANT CHANGE UP (ref 4.8–10.8)
WBC # FLD AUTO: 9.2 K/UL — SIGNIFICANT CHANGE UP (ref 4.8–10.8)

## 2017-07-08 PROCEDURE — 71010: CPT | Mod: 26

## 2017-07-08 RX ORDER — AMLODIPINE BESYLATE 2.5 MG/1
1 TABLET ORAL
Qty: 0 | Refills: 0 | COMMUNITY

## 2017-07-08 RX ORDER — SPIRONOLACTONE 25 MG/1
2 TABLET, FILM COATED ORAL
Qty: 60 | Refills: 0 | OUTPATIENT
Start: 2017-07-08 | End: 2017-08-07

## 2017-07-08 RX ORDER — PANTOPRAZOLE SODIUM 20 MG/1
1 TABLET, DELAYED RELEASE ORAL
Qty: 14 | Refills: 0 | OUTPATIENT
Start: 2017-07-08 | End: 2017-07-22

## 2017-07-08 RX ORDER — POLYETHYLENE GLYCOL 3350 17 G/17G
17 POWDER, FOR SOLUTION ORAL
Qty: 0 | Refills: 0 | COMMUNITY
Start: 2017-07-08

## 2017-07-08 RX ORDER — CARVEDILOL PHOSPHATE 80 MG/1
1 CAPSULE, EXTENDED RELEASE ORAL
Qty: 60 | Refills: 1 | OUTPATIENT
Start: 2017-07-08 | End: 2017-09-05

## 2017-07-08 RX ORDER — ATORVASTATIN CALCIUM 80 MG/1
1 TABLET, FILM COATED ORAL
Qty: 30 | Refills: 1 | OUTPATIENT
Start: 2017-07-08 | End: 2017-09-05

## 2017-07-08 RX ORDER — DULOXETINE HYDROCHLORIDE 30 MG/1
1 CAPSULE, DELAYED RELEASE ORAL
Qty: 30 | Refills: 1 | OUTPATIENT
Start: 2017-07-08 | End: 2017-09-05

## 2017-07-08 RX ORDER — FUROSEMIDE 40 MG
1 TABLET ORAL
Qty: 30 | Refills: 0 | OUTPATIENT
Start: 2017-07-08 | End: 2017-08-07

## 2017-07-08 RX ORDER — AMIODARONE HYDROCHLORIDE 400 MG/1
1 TABLET ORAL
Qty: 30 | Refills: 1 | OUTPATIENT
Start: 2017-07-08 | End: 2017-09-05

## 2017-07-08 RX ORDER — OXYCODONE HYDROCHLORIDE 5 MG/1
1 TABLET ORAL
Qty: 20 | Refills: 0 | OUTPATIENT
Start: 2017-07-08 | End: 2017-07-13

## 2017-07-08 RX ORDER — POTASSIUM CHLORIDE 20 MEQ
0 PACKET (EA) ORAL
Qty: 0 | Refills: 0 | COMMUNITY

## 2017-07-08 RX ORDER — LEVOTHYROXINE SODIUM 125 MCG
1 TABLET ORAL
Qty: 30 | Refills: 0 | OUTPATIENT
Start: 2017-07-08 | End: 2017-08-07

## 2017-07-08 RX ORDER — MAGNESIUM HYDROXIDE 400 MG/1
30 TABLET, CHEWABLE ORAL
Qty: 0 | Refills: 0 | COMMUNITY
Start: 2017-07-08

## 2017-07-08 RX ORDER — ASPIRIN/CALCIUM CARB/MAGNESIUM 324 MG
1 TABLET ORAL
Qty: 0 | Refills: 0 | COMMUNITY
Start: 2017-07-08

## 2017-07-08 RX ORDER — FUROSEMIDE 40 MG
80 TABLET ORAL
Qty: 0 | Refills: 0 | COMMUNITY

## 2017-07-08 RX ORDER — CARVEDILOL PHOSPHATE 80 MG/1
1 CAPSULE, EXTENDED RELEASE ORAL
Qty: 0 | Refills: 0 | COMMUNITY

## 2017-07-08 RX ORDER — ACETAMINOPHEN 500 MG
2 TABLET ORAL
Qty: 0 | Refills: 0 | COMMUNITY
Start: 2017-07-08

## 2017-07-08 RX ADMIN — SPIRONOLACTONE 50 MILLIGRAM(S): 25 TABLET, FILM COATED ORAL at 05:35

## 2017-07-08 RX ADMIN — ALBUTEROL 2.5 MILLIGRAM(S): 90 AEROSOL, METERED ORAL at 08:13

## 2017-07-08 RX ADMIN — Medication 325 MILLIGRAM(S): at 11:50

## 2017-07-08 RX ADMIN — Medication 40 MILLIGRAM(S): at 05:35

## 2017-07-08 RX ADMIN — CARVEDILOL PHOSPHATE 3.12 MILLIGRAM(S): 80 CAPSULE, EXTENDED RELEASE ORAL at 00:04

## 2017-07-08 RX ADMIN — DULOXETINE HYDROCHLORIDE 60 MILLIGRAM(S): 30 CAPSULE, DELAYED RELEASE ORAL at 11:50

## 2017-07-08 RX ADMIN — AMIODARONE HYDROCHLORIDE 200 MILLIGRAM(S): 400 TABLET ORAL at 05:35

## 2017-07-08 RX ADMIN — Medication 100 MICROGRAM(S): at 05:35

## 2017-07-08 RX ADMIN — CARVEDILOL PHOSPHATE 3.12 MILLIGRAM(S): 80 CAPSULE, EXTENDED RELEASE ORAL at 11:50

## 2017-07-08 RX ADMIN — SODIUM CHLORIDE 3 MILLILITER(S): 9 INJECTION INTRAMUSCULAR; INTRAVENOUS; SUBCUTANEOUS at 11:54

## 2017-07-08 RX ADMIN — PANTOPRAZOLE SODIUM 40 MILLIGRAM(S): 20 TABLET, DELAYED RELEASE ORAL at 05:35

## 2017-07-08 RX ADMIN — TIOTROPIUM BROMIDE 1 CAPSULE(S): 18 CAPSULE ORAL; RESPIRATORY (INHALATION) at 08:13

## 2017-07-08 RX ADMIN — SODIUM CHLORIDE 3 MILLILITER(S): 9 INJECTION INTRAMUSCULAR; INTRAVENOUS; SUBCUTANEOUS at 05:33

## 2017-07-08 NOTE — PROGRESS NOTE ADULT - PROBLEM SELECTOR PLAN 2
MARTHA   started on amio (pt w/ h/o of Afib s/p lariat)  NO a/c needed 2/2 previous appendage resection
rate controlled on amio/dig,   start low dose coreg today
rate controlled on amio/dig, start beta blocker once off inotropes/midodrine  NO a/c needed 2/2 previous appendage resection
rate controlled on amiodarone,   start low dose coreg
Hx of AICD and Lariat procedure  Treat acute episodes  Currently in NSR

## 2017-07-08 NOTE — PROGRESS NOTE ADULT - PROBLEM SELECTOR PROBLEM 7
Other constipation
Reduced ejection fraction concurrent with and due to acute heart failure
Simple chronic bronchitis

## 2017-07-08 NOTE — PROGRESS NOTE ADULT - PROBLEM SELECTOR PLAN 7
Currently on vent at 40% FiO2 with SpO2 95% or greater  Plan to extubate in AM if stable hemodynamics  Restart medication from home once extubated
cathartics/stool softeners
continue albuterol / spiriva
continue duoneb / spiriva
continue duoneb / spiriva
start coreg  follow up echo today
xopenex prn in setting of afib
Currently on vent at 40% FiO2 with SpO2 95% or greater  Plan to extubate in AM if stable hemodynamics  Restart medication from home once extubated

## 2017-07-08 NOTE — PROGRESS NOTE ADULT - PROBLEM SELECTOR PROBLEM 3
Cardiogenic shock
Non-rheumatic mitral regurgitation
Non-rheumatic mitral regurgitation
Cardiogenic shock

## 2017-07-08 NOTE — PROGRESS NOTE ADULT - PROBLEM SELECTOR PLAN 6
Continue to titrate pressors for MAP 70-80  Start beta blocker once extubated
Continue to titrate pressors for MAP 70-80  Start beta blocker once extubated
Continue to titrate pressors for MAP 70-80  Start beta blocker once extubated and off of inotropes / pressors
continue albuterol / spiriva
continue albuterol / spiriva  Home: Advair
maintain MAP 70-80  Start beta blocker once extubated and off of inotropes
maintain MAP 70-80  Start beta blocker once off of inotropes and midodrine weaned
Continue to titrate pressors for MAP 70-80  Start beta blocker once extubated

## 2017-07-08 NOTE — PROGRESS NOTE ADULT - PROBLEM SELECTOR PLAN 9
Oxycodone IR
cont snythorid
cont synthroid
Resume Synthorid IV  Transition to PO once extubated
resume diuretics  ACE wrap lower extremities

## 2017-07-08 NOTE — PROGRESS NOTE ADULT - PROBLEM SELECTOR PROBLEM 2
Chronic atrial fibrillation

## 2017-07-08 NOTE — PROGRESS NOTE ADULT - PROBLEM SELECTOR PLAN 5
As Above
Restarted Coreg but will hold on home dose Norvasc or ACE Inhibitor for now.
coreg bid
s/p TV repair   As Above
As Above

## 2017-07-08 NOTE — PROGRESS NOTE ADULT - ASSESSMENT
71F, pmhx NICM, chronic systoilc CHF, s/p AICD, afib, anemia(baseline hb 9), hx gastric bypass, GI bleed, scleroderma, OA, hypothyroidism, GERD    Preop: progressive SOB revealed MR/TR    6/29 s/p Mitral valve repair, tricuspid valve repair requiring intraop IABP for biventricular failure/cardiogenic shock    Postop course: 7/1 25 beats NSVT 7/2 IABP removed, inotropes slowly weaned to off. 7/5 PPM rate increased to 70 7/8 ? inappropriate pacing: interrogated PPM by [facility] showed normal function, underlying AF. Echo reveals improved EF    Plan:  Discussed with Dr Cote in AM rounds and at the bedside  Patient cleared for discharge today  Patient needs to call for appointment

## 2017-07-08 NOTE — PROGRESS NOTE ADULT - PROBLEM SELECTOR PLAN 3
Improving,  down on inotropic support  IABP successfully weaned in AM.  Patient already oob to chair earlier today.
Improving,  down on inotropic support  IABP successfully weaned,  Patient already oob to chair, working with PT
Improving,  down on inotropic support  wean IABp and d/c in AM
Improving,  down on inotropic support  wean IABp and d/c in AM if cleared by attending
no further pressor requirements  low dose primacor> selmaley d/c today
s/p MV repair  cont OOB/PT  Aspirin  Coreg
s/p MV repair  cont OOB/PT  encourage IS  d/c miguelangel today  d/w Dr. Padron
On inotropic and pressor support  IABP in left groin without hematoma or active bleeding  Continue to titrate pressors as tolerated  Attempt to extubate in AM if stable  Frequent ABGs and CVO2  Close continuous hemodynamic monitoring  Maintain IABP on 1:1, discuss with attending surgeon when to wean once hemodynamically stable

## 2017-07-08 NOTE — PROGRESS NOTE ADULT - PROBLEM SELECTOR PROBLEM 9
Bilateral edema of lower extremity
Hypothyroidism, unspecified type
Pain, acute postoperative
Hypothyroidism, unspecified type

## 2017-07-08 NOTE — PROGRESS NOTE ADULT - PROBLEM SELECTOR PROBLEM 4
Non-rheumatic mitral regurgitation
Non-rheumatic tricuspid valve insufficiency
Non-rheumatic tricuspid valve insufficiency
Non-rheumatic mitral regurgitation
Non-rheumatic mitral regurgitation

## 2017-07-08 NOTE — PROGRESS NOTE ADULT - PROBLEM SELECTOR PLAN 8
Daily CBC
Daily CBC  Add iron, vitamin c, vitamin b12, folic acid
Fe, folate, Vit C if can tolerate
cathartics
resume diuretics  ACE wrap lower extremities
Daily CBC  Add iron, vitamin c, vitamin b12, folic acid once extuabted

## 2017-07-08 NOTE — PROGRESS NOTE ADULT - PROBLEM SELECTOR PROBLEM 1
Acute on chronic systolic heart failure

## 2017-07-08 NOTE — PROGRESS NOTE ADULT - PROBLEM SELECTOR PROBLEM 8
Anemia, chronic disease
Bilateral edema of lower extremity
Other constipation
Anemia, chronic disease

## 2017-07-08 NOTE — PROGRESS NOTE ADULT - PROBLEM SELECTOR PROBLEM 5
Essential hypertension
Essential hypertension
Non-rheumatic tricuspid valve insufficiency

## 2017-07-08 NOTE — PROGRESS NOTE ADULT - SUBJECTIVE AND OBJECTIVE BOX
Subjective: "I'm very tired after yesterday. The medications were so harsh on my stomach." Patient was given laxatives after going 8 days without a bowel movement. Patient then had "explosive" diarrhea episodes. Feels better today but tired. Denies CP, SOB.    VITAL SIGNS  Vital Signs Last 24 Hrs  T(C): 36.7 (17 @ 09:00), Max: 37.2 (17 @ 00:00)  T(F): 98 (17 @ 09:00), Max: 98.9 (17 @ 00:00)  HR: 75 (17 @ 09:00) (72 - 80)  BP: 85/72 (17 @ 09:00) (85/72 - 132/97)  RR: 16 (17 @ 05:29) (16 - 18)  SpO2: 100% (17 @ 05:29) (95% - 100%)  on RA            Telemetry/Alarms:   75 (underlying AF, occ pacing), desat alarms but likely due to raynauds and not real.  LVEF: 25% --> 45-50%    Allergies    Keflex (Unknown)      MEDICATIONS  docusate sodium 100 milliGRAM(s) Oral three times a day  DULoxetine 60 milliGRAM(s) Oral daily  atorvastatin 10 milliGRAM(s) Oral at bedtime  tiotropium 18 MICROgram(s) Capsule 1 Capsule(s) Inhalation daily  aspirin enteric coated 325 milliGRAM(s) Oral daily  levothyroxine 100 MICROGram(s) Oral daily  pantoprazole    Tablet 40 milliGRAM(s) Oral before breakfast  benzocaine 15 mG/menthol 3.6 mG Lozenge 1 Lozenge Oral every 3 hours PRN  ALBUTerol    0.083% 2.5 milliGRAM(s) Nebulizer every 6 hours  enoxaparin Injectable 40 milliGRAM(s) SubCutaneous daily  amiodarone    Tablet 200 milliGRAM(s) Oral daily  sodium chloride 0.9% lock flush 3 milliLiter(s) IV Push every 8 hours  acetaminophen   Tablet. 650 milliGRAM(s) Oral every 6 hours PRN  polyethylene glycol 3350 17 Gram(s) Oral daily  magnesium hydroxide Suspension 30 milliLiter(s) Oral daily PRN  bisacodyl Suppository 10 milliGRAM(s) Rectal daily PRN  carvedilol 3.125 milliGRAM(s) Oral every 12 hours  spironolactone 50 milliGRAM(s) Oral daily  magnesium citrate Solution 240 milliLiter(s) Oral daily  furosemide    Tablet 40 milliGRAM(s) Oral daily      PHYSICAL EXAM  General: well nourished, well developed, no acute distress  Neurology: alert and oriented x 3, nonfocal, no gross deficits  Respiratory: mildly decreased at bases.  CV: regular rate and rhythm, normal S1, S2  Abdomen: soft, nontender, nondistended, positive bowel sounds, last bowel movement today  Extremities: warm, well perfused. 1-2+ edema. + DP pulses  Incisions: midline sternal incision,  c/d/i. sternum stable. chest tube sites silks removed.          @ 07:01  -   @ 07:00  --------------------------------------------------------  IN: 480 mL / OUT: 1500 mL / NET: -1020 mL        Weights:  Daily     Daily Weight in k.6 (2017 06:28)  Admit Wt: Drug Dosing Weight  Height (cm): 154.94 (2017 06:37)  Weight (kg): 68 (2017 06:37)  BMI (kg/m2): 28.3 (2017 06:37)  BSA (m2): 1.67 (2017 06:37)    LABS  -    137  |  100  |  15.0  ----------------------------<  74  4.4   |  23.0  |  1.04    Ca    9.0      2017 05:37  Mg     2.5         TPro  5.8<L>  /  Alb  2.8<L>  /  TBili  0.6  /  DBili  x   /  AST  21  /  ALT  15  /  AlkPhos  55  -                                 11.2   9.2   )-----------( 308      ( 2017 05:37 )             34.0            Bilirubin Total, Serum: 0.6 mg/dL ( @ 05:37)           Today's CXR: tiny left effusion    Today's EKG: yest  70, QTc 502    Yesterday's Echo: < from: TTE Echo Limited or F/U (17 @ 14:50) >  Summary:   1. Left ventricular ejection fraction, by visual estimation, is 45 to   50%.   2. Mildly decreased global left ventricular systolic function.   3. Abnormal septal motion consistent with post-operative status.   4. The left ventricular diastolic function could not be assessed in this   study.   5. Normal left ventricular internal cavity size.   6. Moderate to severe left atrial enlargement.   7. Moderately dilated right atrium.   8. Status-post mitral annular ring insertion.   9. Mild mitral valve regurgitation.  10. Normal trileaflet aortic valve with normal opening.  11. Patient is s/p tricuspid valve repair.  12. Mild to moderate pulmonic valve regurgitation.  13. Trivial pericardial effusion.  14. Moderate pleural effusion in both left and right lateral regions.     MD Sara Electronically signed on 2017 at 4:20:10 PM       < end of copied text >      PAST MEDICAL & SURGICAL HISTORY:  B12 deficiency anemia  COPD (chronic obstructive pulmonary disease)  Arthritis  Ejection fraction < 50%  CHF (congestive heart failure): last episode  2017   at  [facility], treated with diuretcss  MR (mitral regurgitation): moderate to severe  Anemia  Ventricular tachycardia  Cardiomyopathy  Scleroderma  Chronic back pain  Sleep apnea: uses oral plate  Raynaud's disease  GI bleed not requiring more than 4 units of blood in 24 hours, ICU, or surgery  Hypothyroid  Afib  MI (myocardial infarction): 3 times (,)  HLD (hyperlipidemia)  HTN (hypertension)  Abnormality of left atrial appendage: s/p Lariat   History of breast surgery: "lift"  Spinal cord stimulator status: XING () recent battery change  AICD (automatic cardioverter/defibrillator) present: [employer] ()  Hernia: x2  Bariatric surgery status: gastric bypass

## 2017-07-08 NOTE — PROGRESS NOTE ADULT - PROBLEM SELECTOR PLAN 4
PT consult  pain control  wean o2 as tolerated  wean inotropes   diuresis PRN  monitor h/h,  strict postop gylcemic control
PT consult  pain control  wean o2 as tolerated  wean pressors/ionotrops once shock improves  diuresis PRN  monitor h/h,  hold lovenox for now until IABP out  strict postop gylcemic control
oob to chair once off bedrest,  PT consult  pain control  wean o2 as tolerated  wean pressors/ionotrops once shock improves  diuresis PRN  monitor h/h,  hold lovenox for now until IABP out  strict postop gylcemic control
oob to chair once off bedrest,  PT consult  pain control  wean o2 as tolerated  wean pressors/ionotrops once shock improves  diuresis PRN  monitor h/h,  hold lovenox for now until IABP out  strict postop gylcemic control
s/p MV repair  cont OOB/PT  encourage IS  d/c miguelangel today  d/w Dr. Padron
s/p TV repair   As Above
s/p TV repair   As Above
Titrate pressors and inotropes as tolerated  Start beta blocker as toelrated by HR and SBP once off gtts  Once extubated: Encourage PO intake, Encourage OOB to chair and ambulation with PT, Encourage deep breathing exercised and coughing  Chest PT with nursing staff  Tight glucose control  Titrate insulin and add premeal as required by CTICU insulin protocol once taking PO intake

## 2017-07-11 RX ORDER — AMLODIPINE BESYLATE 5 MG/1
5 TABLET ORAL
Qty: 90 | Refills: 0 | Status: DISCONTINUED | COMMUNITY
Start: 2016-12-27 | End: 2017-07-11

## 2017-07-11 RX ORDER — OXYCODONE HYDROCHLORIDE AND ACETAMINOPHEN 5; 325 MG/1; MG/1
5-325 TABLET ORAL
Refills: 0 | Status: ACTIVE | COMMUNITY

## 2017-07-11 RX ORDER — ALPRAZOLAM 0.25 MG/1
0.25 TABLET ORAL
Qty: 1 | Refills: 0 | Status: DISCONTINUED | COMMUNITY
Start: 2017-06-06 | End: 2017-07-11

## 2017-07-11 RX ORDER — POTASSIUM CHLORIDE 1500 MG/1
20 TABLET, EXTENDED RELEASE ORAL
Qty: 90 | Refills: 3 | Status: DISCONTINUED | COMMUNITY
Start: 2017-05-01 | End: 2017-07-11

## 2017-07-11 RX ORDER — FUROSEMIDE 80 MG/1
80 TABLET ORAL DAILY
Qty: 1 | Refills: 2 | Status: DISCONTINUED | COMMUNITY
Start: 2017-01-17 | End: 2017-07-11

## 2017-07-21 ENCOUNTER — RECORD ABSTRACTING (OUTPATIENT)
Age: 72
End: 2017-07-21

## 2017-07-24 ENCOUNTER — RECORD ABSTRACTING (OUTPATIENT)
Age: 72
End: 2017-07-24

## 2017-07-24 RX ORDER — ASPIRIN/ACETAMINOPHEN/CAFFEINE 500-325-65
325 POWDER IN PACKET (EA) ORAL
Refills: 0 | Status: DISCONTINUED | COMMUNITY
End: 2017-07-24

## 2017-07-25 ENCOUNTER — APPOINTMENT (OUTPATIENT)
Dept: CARDIOTHORACIC SURGERY | Facility: CLINIC | Age: 72
End: 2017-07-25

## 2017-07-25 VITALS
DIASTOLIC BLOOD PRESSURE: 66 MMHG | RESPIRATION RATE: 16 BRPM | HEART RATE: 89 BPM | OXYGEN SATURATION: 98 % | BODY MASS INDEX: 28.27 KG/M2 | HEIGHT: 60 IN | SYSTOLIC BLOOD PRESSURE: 112 MMHG | WEIGHT: 144 LBS

## 2017-09-19 ENCOUNTER — APPOINTMENT (OUTPATIENT)
Dept: FAMILY MEDICINE | Facility: CLINIC | Age: 72
End: 2017-09-19
Payer: MEDICARE

## 2017-09-19 VITALS
SYSTOLIC BLOOD PRESSURE: 120 MMHG | HEIGHT: 60 IN | WEIGHT: 144 LBS | DIASTOLIC BLOOD PRESSURE: 84 MMHG | BODY MASS INDEX: 28.27 KG/M2

## 2017-09-19 DIAGNOSIS — E03.9 HYPOTHYROIDISM, UNSPECIFIED: ICD-10-CM

## 2017-09-19 DIAGNOSIS — Z87.891 PERSONAL HISTORY OF NICOTINE DEPENDENCE: ICD-10-CM

## 2017-09-19 DIAGNOSIS — I73.00 RAYNAUD'S SYNDROME W/OUT GANGRENE: ICD-10-CM

## 2017-09-19 DIAGNOSIS — I42.9 CARDIOMYOPATHY, UNSPECIFIED: ICD-10-CM

## 2017-09-19 DIAGNOSIS — I25.9 CHRONIC ISCHEMIC HEART DISEASE, UNSPECIFIED: ICD-10-CM

## 2017-09-19 DIAGNOSIS — I48.91 UNSPECIFIED ATRIAL FIBRILLATION: ICD-10-CM

## 2017-09-19 DIAGNOSIS — Z86.79 PERSONAL HISTORY OF OTHER DISEASES OF THE CIRCULATORY SYSTEM: ICD-10-CM

## 2017-09-19 DIAGNOSIS — I25.2 OLD MYOCARDIAL INFARCTION: ICD-10-CM

## 2017-09-19 DIAGNOSIS — E78.5 HYPERLIPIDEMIA, UNSPECIFIED: ICD-10-CM

## 2017-09-19 DIAGNOSIS — Z82.49 FAMILY HISTORY OF ISCHEMIC HEART DISEASE AND OTHER DISEASES OF THE CIRCULATORY SYSTEM: ICD-10-CM

## 2017-09-19 PROCEDURE — 99215 OFFICE O/P EST HI 40 MIN: CPT | Mod: 25

## 2017-09-19 PROCEDURE — G0008: CPT

## 2017-09-19 PROCEDURE — 90662 IIV NO PRSV INCREASED AG IM: CPT

## 2017-09-19 RX ORDER — AMIODARONE HYDROCHLORIDE 200 MG/1
200 TABLET ORAL DAILY
Qty: 90 | Refills: 3 | Status: ACTIVE | COMMUNITY

## 2017-09-19 RX ORDER — CARVEDILOL 6.25 MG/1
6.25 TABLET, FILM COATED ORAL TWICE DAILY
Qty: 180 | Refills: 3 | Status: ACTIVE | COMMUNITY
Start: 2017-07-08

## 2017-09-19 RX ORDER — SPIRONOLACTONE 25 MG/1
25 TABLET, FILM COATED ORAL DAILY
Refills: 0 | Status: ACTIVE | COMMUNITY

## 2017-10-02 ENCOUNTER — OUTPATIENT (OUTPATIENT)
Dept: OUTPATIENT SERVICES | Facility: HOSPITAL | Age: 72
LOS: 1 days | End: 2017-10-02
Payer: MEDICARE

## 2017-10-02 DIAGNOSIS — Z98.89 OTHER SPECIFIED POSTPROCEDURAL STATES: Chronic | ICD-10-CM

## 2017-10-02 DIAGNOSIS — Z95.2 PRESENCE OF PROSTHETIC HEART VALVE: ICD-10-CM

## 2017-10-02 DIAGNOSIS — Z95.810 PRESENCE OF AUTOMATIC (IMPLANTABLE) CARDIAC DEFIBRILLATOR: Chronic | ICD-10-CM

## 2017-10-02 DIAGNOSIS — Z95.3 PRESENCE OF XENOGENIC HEART VALVE: ICD-10-CM

## 2017-10-02 DIAGNOSIS — Q20.8 OTHER CONGENITAL MALFORMATIONS OF CARDIAC CHAMBERS AND CONNECTIONS: Chronic | ICD-10-CM

## 2017-10-11 PROCEDURE — 85014 HEMATOCRIT: CPT

## 2017-10-11 PROCEDURE — 97530 THERAPEUTIC ACTIVITIES: CPT

## 2017-10-11 PROCEDURE — P9045: CPT

## 2017-10-11 PROCEDURE — 99231 SBSQ HOSP IP/OBS SF/LOW 25: CPT

## 2017-10-11 PROCEDURE — 93005 ELECTROCARDIOGRAM TRACING: CPT

## 2017-10-11 PROCEDURE — 82550 ASSAY OF CK (CPK): CPT

## 2017-10-11 PROCEDURE — 85610 PROTHROMBIN TIME: CPT

## 2017-10-11 PROCEDURE — 86922 COMPATIBILITY TEST ANTIGLOB: CPT

## 2017-10-11 PROCEDURE — 82803 BLOOD GASES ANY COMBINATION: CPT

## 2017-10-11 PROCEDURE — 83735 ASSAY OF MAGNESIUM: CPT

## 2017-10-11 PROCEDURE — 97110 THERAPEUTIC EXERCISES: CPT

## 2017-10-11 PROCEDURE — P9012: CPT

## 2017-10-11 PROCEDURE — 86900 BLOOD TYPING SEROLOGIC ABO: CPT

## 2017-10-11 PROCEDURE — 82947 ASSAY GLUCOSE BLOOD QUANT: CPT

## 2017-10-11 PROCEDURE — 71045 X-RAY EXAM CHEST 1 VIEW: CPT

## 2017-10-11 PROCEDURE — 97163 PT EVAL HIGH COMPLEX 45 MIN: CPT

## 2017-10-11 PROCEDURE — 97116 GAIT TRAINING THERAPY: CPT

## 2017-10-11 PROCEDURE — 82435 ASSAY OF BLOOD CHLORIDE: CPT

## 2017-10-11 PROCEDURE — 84484 ASSAY OF TROPONIN QUANT: CPT

## 2017-10-11 PROCEDURE — 36415 COLL VENOUS BLD VENIPUNCTURE: CPT

## 2017-10-11 PROCEDURE — 84132 ASSAY OF SERUM POTASSIUM: CPT

## 2017-10-11 PROCEDURE — C1889: CPT

## 2017-10-11 PROCEDURE — 85027 COMPLETE CBC AUTOMATED: CPT

## 2017-10-11 PROCEDURE — 86902 BLOOD TYPE ANTIGEN DONOR EA: CPT

## 2017-10-11 PROCEDURE — 86965 POOLING BLOOD PLATELETS: CPT

## 2017-10-11 PROCEDURE — 36430 TRANSFUSION BLD/BLD COMPNT: CPT

## 2017-10-11 PROCEDURE — 86850 RBC ANTIBODY SCREEN: CPT

## 2017-10-11 PROCEDURE — 84295 ASSAY OF SERUM SODIUM: CPT

## 2017-10-11 PROCEDURE — 82553 CREATINE MB FRACTION: CPT

## 2017-10-11 PROCEDURE — 94640 AIRWAY INHALATION TREATMENT: CPT

## 2017-10-11 PROCEDURE — P9037: CPT

## 2017-10-11 PROCEDURE — 80048 BASIC METABOLIC PNL TOTAL CA: CPT

## 2017-10-11 PROCEDURE — P9016: CPT

## 2017-10-11 PROCEDURE — 93308 TTE F-UP OR LMTD: CPT

## 2017-10-11 PROCEDURE — 82330 ASSAY OF CALCIUM: CPT

## 2017-10-11 PROCEDURE — 84100 ASSAY OF PHOSPHORUS: CPT

## 2017-10-11 PROCEDURE — 83605 ASSAY OF LACTIC ACID: CPT

## 2017-10-11 PROCEDURE — 80053 COMPREHEN METABOLIC PANEL: CPT

## 2017-10-11 PROCEDURE — 94003 VENT MGMT INPAT SUBQ DAY: CPT

## 2017-10-11 PROCEDURE — 99221 1ST HOSP IP/OBS SF/LOW 40: CPT

## 2017-10-11 PROCEDURE — 86901 BLOOD TYPING SEROLOGIC RH(D): CPT

## 2017-10-11 PROCEDURE — 94760 N-INVAS EAR/PLS OXIMETRY 1: CPT

## 2017-10-11 PROCEDURE — 94002 VENT MGMT INPAT INIT DAY: CPT

## 2017-10-11 PROCEDURE — 85730 THROMBOPLASTIN TIME PARTIAL: CPT

## 2017-10-20 ENCOUNTER — APPOINTMENT (OUTPATIENT)
Dept: PULMONOLOGY | Facility: CLINIC | Age: 72
End: 2017-10-20
Payer: MEDICARE

## 2017-10-20 VITALS — BODY MASS INDEX: 26.95 KG/M2 | DIASTOLIC BLOOD PRESSURE: 50 MMHG | WEIGHT: 138 LBS | SYSTOLIC BLOOD PRESSURE: 98 MMHG

## 2017-10-20 DIAGNOSIS — J44.9 CHRONIC OBSTRUCTIVE PULMONARY DISEASE, UNSPECIFIED: ICD-10-CM

## 2017-10-20 DIAGNOSIS — E66.3 OVERWEIGHT: ICD-10-CM

## 2017-10-20 DIAGNOSIS — R91.1 SOLITARY PULMONARY NODULE: ICD-10-CM

## 2017-10-20 DIAGNOSIS — R06.02 SHORTNESS OF BREATH: ICD-10-CM

## 2017-10-20 DIAGNOSIS — R05 COUGH: ICD-10-CM

## 2017-10-20 DIAGNOSIS — J90 PLEURAL EFFUSION, NOT ELSEWHERE CLASSIFIED: ICD-10-CM

## 2017-10-20 DIAGNOSIS — R93.8 ABNORMAL FINDINGS ON DIAGNOSTIC IMAGING OF OTHER SPECIFIED BODY STRUCTURES: ICD-10-CM

## 2017-10-20 DIAGNOSIS — G47.33 OBSTRUCTIVE SLEEP APNEA (ADULT) (PEDIATRIC): ICD-10-CM

## 2017-10-20 PROCEDURE — 94010 BREATHING CAPACITY TEST: CPT

## 2017-10-20 PROCEDURE — 99215 OFFICE O/P EST HI 40 MIN: CPT | Mod: 25

## 2017-10-20 RX ORDER — DOXYCYCLINE HYCLATE 100 MG/1
100 CAPSULE ORAL
Qty: 20 | Refills: 0 | Status: DISCONTINUED | COMMUNITY
Start: 2017-05-11

## 2017-10-20 RX ORDER — OXYCODONE 5 MG/1
5 TABLET ORAL
Qty: 20 | Refills: 0 | Status: ACTIVE | COMMUNITY
Start: 2017-07-08

## 2017-10-20 RX ORDER — GUAIFENESIN AND CODEINE PHOSPHATE 10; 100 MG/5ML; MG/5ML
100-10 SOLUTION ORAL
Qty: 180 | Refills: 0 | Status: DISCONTINUED | COMMUNITY
Start: 2017-05-11

## 2017-10-23 ENCOUNTER — RX RENEWAL (OUTPATIENT)
Age: 72
End: 2017-10-23

## 2017-10-23 RX ORDER — TIOTROPIUM BROMIDE 18 UG/1
18 CAPSULE ORAL; RESPIRATORY (INHALATION) DAILY
Qty: 90 | Refills: 1 | Status: ACTIVE | COMMUNITY
Start: 2017-01-17 | End: 1900-01-01

## 2017-11-06 PROCEDURE — 93798 PHYS/QHP OP CAR RHAB W/ECG: CPT

## 2017-11-09 ENCOUNTER — APPOINTMENT (OUTPATIENT)
Dept: NEPHROLOGY | Facility: CLINIC | Age: 72
End: 2017-11-09
Payer: MEDICARE

## 2017-11-09 VITALS
WEIGHT: 142 LBS | DIASTOLIC BLOOD PRESSURE: 70 MMHG | BODY MASS INDEX: 27.88 KG/M2 | SYSTOLIC BLOOD PRESSURE: 110 MMHG | HEIGHT: 60 IN

## 2017-11-09 PROCEDURE — 99215 OFFICE O/P EST HI 40 MIN: CPT

## 2017-11-09 RX ORDER — ENALAPRIL MALEATE 2.5 MG/1
2.5 TABLET ORAL DAILY
Refills: 0 | Status: ACTIVE | COMMUNITY
Start: 2017-11-09

## 2017-11-09 RX ORDER — CARVEDILOL 3.12 MG/1
3.12 TABLET, FILM COATED ORAL
Qty: 60 | Refills: 0 | Status: DISCONTINUED | COMMUNITY
Start: 2017-07-08 | End: 2017-11-09

## 2017-11-09 RX ORDER — DICLOFENAC SODIUM 10 MG/G
1 GEL TOPICAL
Qty: 500 | Refills: 0 | Status: DISCONTINUED | COMMUNITY
Start: 2017-09-07 | End: 2017-11-09

## 2017-11-09 RX ORDER — FUROSEMIDE 40 MG/1
40 TABLET ORAL
Refills: 0 | Status: DISCONTINUED | COMMUNITY
End: 2017-11-09

## 2017-11-09 RX ORDER — TORSEMIDE 20 MG/1
20 TABLET ORAL
Qty: 90 | Refills: 1 | Status: ACTIVE | COMMUNITY
Start: 2017-11-09

## 2018-02-12 ENCOUNTER — RX RENEWAL (OUTPATIENT)
Age: 73
End: 2018-02-12

## 2018-02-12 RX ORDER — ALBUTEROL SULFATE 90 UG/1
108 (90 BASE) AEROSOL, METERED RESPIRATORY (INHALATION)
Qty: 34 | Refills: 0 | Status: ACTIVE | COMMUNITY
Start: 2017-10-20 | End: 1900-01-01

## 2019-12-10 NOTE — H&P PST ADULT - LAST ECHOCARDIOGRAM

## 2020-02-12 NOTE — PROGRESS NOTE ADULT - PROBLEM/PLAN-9
DISPLAY PLAN FREE TEXT

## 2021-02-26 NOTE — PROGRESS NOTE ADULT - PROBLEM SELECTOR PROBLEM 6
2/26/2021    Medication: levothyroxine    Last refill: 1/11/2021  Last OV: 10/30/2020  Labs: 2/27/2020  Future OV: 9/17/2021        
Essential hypertension
Simple chronic bronchitis
Simple chronic bronchitis
Essential hypertension

## 2022-05-10 NOTE — DISCHARGE NOTE ADULT - DISCHARGE TO
EMERGENCY DEPARTMENT HISTORY AND PHYSICAL EXAM      Date: 5/9/2022  Patient Name: Irma Bradford    History of Presenting Illness     Chief Complaint   Patient presents with    Toe Pain       History Provided By: Patient    HPI: Irma Bradford, 44 y.o. male with no reported chronic medical conditions who presents to the ED for evaluation of calluses between right fourth and fifth toe with associated pain. Patient states symptoms have been present for the past 3 to 4 weeks. patient states he contributes the formation to his wearing steel toe boots at work. Endorses localized pain/soreness. Pain is worse when he is wearing the boots and the areas rub against one another. States he did attempt the salicylic acid he was prescribed on his prior visit with minimal improvement. Denies any redness, warmth. Denies any drainage or bleeding. Denies any extremity numbness, weakness, or tingling. Denies history of diabetes. States he is otherwise in his usual state of health. No other complaints at this time. There are no other complaints, changes, or physical findings at this time. PCP: None    No current facility-administered medications on file prior to encounter. Current Outpatient Medications on File Prior to Encounter   Medication Sig Dispense Refill    salicylic acid 40 % ptmd Apply to affected area daily  Indications: corn 14 Patch 0    ondansetron hcl (Zofran) 4 mg tablet Take 1 Tablet by mouth every eight (8) hours as needed for Nausea. (Patient not taking: Reported on 5/4/2022) 20 Tablet 0    ibuprofen (MOTRIN) 800 mg tablet Take 1 Tab by mouth every eight (8) hours as needed for Pain. (Patient not taking: Reported on 5/4/2022) 30 Tab 0    lidocaine (Lidoderm) 5 % by TransDERmal route every twenty-four (24) hours. Apply patch to the affected area for 12 hours a day and remove for 12 hours a day.  (Patient not taking: Reported on 5/4/2022)      methocarbamoL (ROBAXIN) 750 mg tablet Take 1 Tab by mouth four (4) times daily. (Patient not taking: Reported on 5/4/2022) 20 Tab 0    promethazine (PHENERGAN) 25 mg tablet Take 1 Tab by mouth every six (6) hours as needed. (Patient not taking: Reported on 5/4/2022) 12 Tab 0       Past History     Past Medical History:  History reviewed. No pertinent past medical history. Past Surgical History:  Past Surgical History:   Procedure Laterality Date    HX ORTHOPAEDIC      right big toe surgery       Family History:  History reviewed. No pertinent family history. Social History:  Social History     Tobacco Use    Smoking status: Current Every Day Smoker    Smokeless tobacco: Never Used    Tobacco comment: black and milds   Vaping Use    Vaping Use: Never used   Substance Use Topics    Alcohol use: Yes     Comment: socially     Drug use: Yes     Types: Marijuana       Allergies:  No Known Allergies      Review of Systems   Review of Systems   Constitutional: Negative for appetite change, chills and fever. HENT: Negative for congestion. Eyes: Negative for pain. Respiratory: Negative for cough and shortness of breath. Cardiovascular: Negative for chest pain. Gastrointestinal: Negative for abdominal pain, constipation, diarrhea, nausea and vomiting. Genitourinary: Negative for difficulty urinating, dysuria and frequency. Musculoskeletal: Positive for arthralgias. Skin: Positive for rash (callus ). Neurological: Negative for syncope and headaches. All other systems reviewed and are negative. Physical Exam   Physical Exam  Vitals and nursing note reviewed. Constitutional:       General: He is not in acute distress. Appearance: Normal appearance. He is not ill-appearing or toxic-appearing. Comments: 44 y.o. male   HENT:      Head: Normocephalic and atraumatic.       Right Ear: External ear normal.      Left Ear: External ear normal.      Nose: Nose normal.      Mouth/Throat:      Mouth: Mucous membranes are moist.   Eyes: Extraocular Movements: Extraocular movements intact. Conjunctiva/sclera: Conjunctivae normal.   Cardiovascular:      Pulses: Normal pulses. Pulmonary:      Effort: Pulmonary effort is normal. No respiratory distress. Musculoskeletal:         General: Normal range of motion. Cervical back: Normal range of motion. Feet:    Feet:      Comments: Localized callus formation between 4th and 5th toe. No warmth or erythema. No active drainage or bleeding. No crusting, vesicles or streaking. No surrounding induration. Strong DP and PT pulses bilaterally   Skin:     General: Skin is warm and dry. Neurological:      General: No focal deficit present. Mental Status: He is alert and oriented to person, place, and time. Psychiatric:         Mood and Affect: Mood normal.         Behavior: Behavior normal.         Diagnostic Study Results     Labs -   No results found for this or any previous visit (from the past 12 hour(s)). Radiologic Studies -   No orders to display     CT Results  (Last 48 hours)    None        CXR Results  (Last 48 hours)    None            Medical Decision Making   I am the first provider for this patient. I reviewed the vital signs, available nursing notes, past medical history, past surgical history, family history and social history. Vital Signs-Reviewed the patient's vital signs. Patient Vitals for the past 12 hrs:   Temp Pulse Resp BP SpO2   05/09/22 1952 98.3 °F (36.8 °C) 87 17 (!) 125/50 97 %       Records Reviewed: Nursing Notes and Old Medical Records    Provider Notes (Medical Decision Making):   Patient is a very pleasant 43 yo male who presents to the ED for calluses between right 4th and 5th toes and associated discomfort. No evidence of abscess or infection. Shared decision making performed and care plan created together, discussed  diagnosis and treatment plan.  Counseled additional symptomatic management techniques (urea cream to soften calluses, barrier protection between the two areas rubbing,, appropriate fitting boots). PCP and podiatry follow-up. Verbal return precautions advised. Patient verbalizes understanding and agreement of current plan of care. ED Course:   Initial assessment performed. The patients presenting problems have been discussed, and they are in agreement with the care plan formulated and outlined with them. I have encouraged them to ask questions as they arise throughout their visit. Critical Care Time: None    Disposition:  Discharge     PLAN:  1. Current Discharge Medication List      START taking these medications    Details   urea (CARMOL) 40 % topical cream Apply  to affected area two (2) times a day. Qty: 85 g, Refills: 0  Start date: 5/9/2022           2. Follow-up Information     Follow up With Specialties Details Why [address] [facility] EMERGENCY DEPT Emergency Medicine  As needed, If symptoms worsen [address] [facility], [address] [facility] [address] [address]    Vicky Condon MD Internal Medicine Physician  primary care provider [address]  [address] [facility]  [phone]      KARINA [facility]ardo [facility] Podiatry  Podiatry follow-up [address]  [address] [facility] [phone]          Return to ED if worse     Diagnosis     Clinical Impression:   1. Callus between toes          Please note that this dictation was completed with [employer], the computer voice recognition software. Quite often unanticipated grammatical, syntax, homophones, and other interpretive errors are inadvertently transcribed by the computer software. Please disregards these errors. Please excuse any errors that have escaped final proofreading. Home

## 2022-06-23 ENCOUNTER — EMERGENCY (EMERGENCY)
Facility: HOSPITAL | Age: 77
LOS: 1 days | Discharge: HOME | End: 2022-06-23
Attending: EMERGENCY MEDICINE
Payer: MEDICARE

## 2022-06-23 VITALS
OXYGEN SATURATION: 98 % | HEIGHT: 61 IN | HEART RATE: 81 BPM | DIASTOLIC BLOOD PRESSURE: 59 MMHG | SYSTOLIC BLOOD PRESSURE: 116 MMHG | RESPIRATION RATE: 18 BRPM

## 2022-06-23 DIAGNOSIS — Z98.89 OTHER SPECIFIED POSTPROCEDURAL STATES: Chronic | ICD-10-CM

## 2022-06-23 DIAGNOSIS — Z95.810 PRESENCE OF AUTOMATIC (IMPLANTABLE) CARDIAC DEFIBRILLATOR: Chronic | ICD-10-CM

## 2022-06-23 DIAGNOSIS — Q20.8 OTHER CONGENITAL MALFORMATIONS OF CARDIAC CHAMBERS AND CONNECTIONS: Chronic | ICD-10-CM

## 2022-06-23 PROBLEM — J44.9 CHRONIC OBSTRUCTIVE PULMONARY DISEASE, UNSPECIFIED: Chronic | Status: ACTIVE | Noted: 2017-02-16

## 2022-06-23 PROBLEM — D51.9 VITAMIN B12 DEFICIENCY ANEMIA, UNSPECIFIED: Chronic | Status: ACTIVE | Noted: 2017-03-31

## 2022-06-23 PROBLEM — R09.89 OTHER SPECIFIED SYMPTOMS AND SIGNS INVOLVING THE CIRCULATORY AND RESPIRATORY SYSTEMS: Chronic | Status: ACTIVE | Noted: 2017-02-16

## 2022-06-23 PROBLEM — I34.0 NONRHEUMATIC MITRAL (VALVE) INSUFFICIENCY: Chronic | Status: ACTIVE | Noted: 2017-02-16

## 2022-06-23 PROBLEM — I50.9 HEART FAILURE, UNSPECIFIED: Chronic | Status: ACTIVE | Noted: 2017-02-16

## 2022-06-23 PROBLEM — M19.90 UNSPECIFIED OSTEOARTHRITIS, UNSPECIFIED SITE: Chronic | Status: ACTIVE | Noted: 2017-02-16

## 2022-06-23 LAB
ALBUMIN SERPL ELPH-MCNC: 4 G/DL — SIGNIFICANT CHANGE UP (ref 3.3–5.2)
ALP SERPL-CCNC: 137 U/L — HIGH (ref 40–120)
ALT FLD-CCNC: 12 U/L — SIGNIFICANT CHANGE UP
ANION GAP SERPL CALC-SCNC: 10 MMOL/L — SIGNIFICANT CHANGE UP (ref 5–17)
APPEARANCE UR: CLEAR — SIGNIFICANT CHANGE UP
APTT BLD: 30.6 SEC — SIGNIFICANT CHANGE UP (ref 27.5–35.5)
AST SERPL-CCNC: 21 U/L — SIGNIFICANT CHANGE UP
BACTERIA # UR AUTO: ABNORMAL
BASOPHILS # BLD AUTO: 0.02 K/UL — SIGNIFICANT CHANGE UP (ref 0–0.2)
BASOPHILS NFR BLD AUTO: 0.3 % — SIGNIFICANT CHANGE UP (ref 0–2)
BILIRUB SERPL-MCNC: 0.4 MG/DL — SIGNIFICANT CHANGE UP (ref 0.4–2)
BILIRUB UR-MCNC: NEGATIVE — SIGNIFICANT CHANGE UP
BUN SERPL-MCNC: 36.4 MG/DL — HIGH (ref 8–20)
CALCIUM SERPL-MCNC: 8.9 MG/DL — SIGNIFICANT CHANGE UP (ref 8.6–10.2)
CHLORIDE SERPL-SCNC: 103 MMOL/L — SIGNIFICANT CHANGE UP (ref 98–107)
CK SERPL-CCNC: 54 U/L — SIGNIFICANT CHANGE UP (ref 25–170)
CO2 SERPL-SCNC: 28 MMOL/L — SIGNIFICANT CHANGE UP (ref 22–29)
COLOR SPEC: YELLOW — SIGNIFICANT CHANGE UP
CREAT SERPL-MCNC: 1.48 MG/DL — HIGH (ref 0.5–1.3)
DIFF PNL FLD: ABNORMAL
EGFR: 36 ML/MIN/1.73M2 — LOW
EOSINOPHIL # BLD AUTO: 0 K/UL — SIGNIFICANT CHANGE UP (ref 0–0.5)
EOSINOPHIL NFR BLD AUTO: 0 % — SIGNIFICANT CHANGE UP (ref 0–6)
EPI CELLS # UR: SIGNIFICANT CHANGE UP
GLUCOSE SERPL-MCNC: 112 MG/DL — HIGH (ref 70–99)
GLUCOSE UR QL: NEGATIVE MG/DL — SIGNIFICANT CHANGE UP
HCT VFR BLD CALC: 35.4 % — SIGNIFICANT CHANGE UP (ref 34.5–45)
HGB BLD-MCNC: 11.2 G/DL — LOW (ref 11.5–15.5)
IMM GRANULOCYTES NFR BLD AUTO: 0.6 % — SIGNIFICANT CHANGE UP (ref 0–1.5)
INR BLD: 1.06 RATIO — SIGNIFICANT CHANGE UP (ref 0.88–1.16)
KETONES UR-MCNC: NEGATIVE — SIGNIFICANT CHANGE UP
LEUKOCYTE ESTERASE UR-ACNC: NEGATIVE — SIGNIFICANT CHANGE UP
LYMPHOCYTES # BLD AUTO: 0.91 K/UL — LOW (ref 1–3.3)
LYMPHOCYTES # BLD AUTO: 13.2 % — SIGNIFICANT CHANGE UP (ref 13–44)
MCHC RBC-ENTMCNC: 31.3 PG — SIGNIFICANT CHANGE UP (ref 27–34)
MCHC RBC-ENTMCNC: 31.6 GM/DL — LOW (ref 32–36)
MCV RBC AUTO: 98.9 FL — SIGNIFICANT CHANGE UP (ref 80–100)
MONOCYTES # BLD AUTO: 1.04 K/UL — HIGH (ref 0–0.9)
MONOCYTES NFR BLD AUTO: 15.1 % — HIGH (ref 2–14)
NEUTROPHILS # BLD AUTO: 4.87 K/UL — SIGNIFICANT CHANGE UP (ref 1.8–7.4)
NEUTROPHILS NFR BLD AUTO: 70.8 % — SIGNIFICANT CHANGE UP (ref 43–77)
NITRITE UR-MCNC: NEGATIVE — SIGNIFICANT CHANGE UP
PH UR: 6 — SIGNIFICANT CHANGE UP (ref 5–8)
PLATELET # BLD AUTO: 268 K/UL — SIGNIFICANT CHANGE UP (ref 150–400)
POTASSIUM SERPL-MCNC: 3.6 MMOL/L — SIGNIFICANT CHANGE UP (ref 3.5–5.3)
POTASSIUM SERPL-SCNC: 3.6 MMOL/L — SIGNIFICANT CHANGE UP (ref 3.5–5.3)
PROT SERPL-MCNC: 7.1 G/DL — SIGNIFICANT CHANGE UP (ref 6.6–8.7)
PROT UR-MCNC: NEGATIVE — SIGNIFICANT CHANGE UP
PROTHROM AB SERPL-ACNC: 12.3 SEC — SIGNIFICANT CHANGE UP (ref 10.5–13.4)
RAPID RVP RESULT: DETECTED
RBC # BLD: 3.58 M/UL — LOW (ref 3.8–5.2)
RBC # FLD: 15.4 % — HIGH (ref 10.3–14.5)
RBC CASTS # UR COMP ASSIST: SIGNIFICANT CHANGE UP /HPF (ref 0–4)
SARS-COV-2 RNA SPEC QL NAA+PROBE: DETECTED
SODIUM SERPL-SCNC: 141 MMOL/L — SIGNIFICANT CHANGE UP (ref 135–145)
SP GR SPEC: 1.01 — SIGNIFICANT CHANGE UP (ref 1.01–1.02)
TROPONIN T SERPL-MCNC: 0.02 NG/ML — SIGNIFICANT CHANGE UP (ref 0–0.06)
UROBILINOGEN FLD QL: NEGATIVE MG/DL — SIGNIFICANT CHANGE UP
WBC # BLD: 6.88 K/UL — SIGNIFICANT CHANGE UP (ref 3.8–10.5)
WBC # FLD AUTO: 6.88 K/UL — SIGNIFICANT CHANGE UP (ref 3.8–10.5)
WBC UR QL: SIGNIFICANT CHANGE UP /HPF (ref 0–5)

## 2022-06-23 PROCEDURE — 71045 X-RAY EXAM CHEST 1 VIEW: CPT | Mod: 26

## 2022-06-23 PROCEDURE — 93010 ELECTROCARDIOGRAM REPORT: CPT

## 2022-06-23 PROCEDURE — 70450 CT HEAD/BRAIN W/O DYE: CPT | Mod: 26,MA

## 2022-06-23 PROCEDURE — 72131 CT LUMBAR SPINE W/O DYE: CPT | Mod: 26,MA

## 2022-06-23 PROCEDURE — 99285 EMERGENCY DEPT VISIT HI MDM: CPT | Mod: CS

## 2022-06-23 RX ORDER — SODIUM CHLORIDE 9 MG/ML
1000 INJECTION INTRAMUSCULAR; INTRAVENOUS; SUBCUTANEOUS ONCE
Refills: 0 | Status: COMPLETED | OUTPATIENT
Start: 2022-06-23 | End: 2022-06-23

## 2022-06-23 RX ADMIN — SODIUM CHLORIDE 1000 MILLILITER(S): 9 INJECTION INTRAMUSCULAR; INTRAVENOUS; SUBCUTANEOUS at 21:03

## 2022-06-23 NOTE — ED PROVIDER NOTE - NSICDXPASTMEDICALHX_GEN_ALL_CORE_FT
PAST MEDICAL HISTORY:  Afib     Anemia     Arthritis     B12 deficiency anemia     Cardiomyopathy     CHF (congestive heart failure) last episode  jan 2017   at  [facility], treated with diuretcss    Chronic back pain     COPD (chronic obstructive pulmonary disease)     Ejection fraction < 50%     GI bleed not requiring more than 4 units of blood in 24 hours, ICU, or surgery     HLD (hyperlipidemia)     HTN (hypertension)     Hypothyroid     MI (myocardial infarction) 3 times (2000,2002)    MR (mitral regurgitation) moderate to severe    Raynaud's disease     Scleroderma     Sleep apnea uses oral plate    Ventricular tachycardia

## 2022-06-23 NOTE — ED ADULT NURSE NOTE - NSICDXPASTMEDICALHX_GEN_ALL_CORE_FT
PAST MEDICAL HISTORY:  Afib     Anemia     Arthritis     B12 deficiency anemia     Cardiomyopathy     CHF (congestive heart failure) last episode  jan 2017   at  [facility], treated with diuretcss    Chronic back pain     COPD (chronic obstructive pulmonary disease)     Ejection fraction < 50%     GI bleed not requiring more than 4 units of blood in 24 hours, ICU, or surgery     HLD (hyperlipidemia)     HTN (hypertension)     Hypothyroid     MI (myocardial infarction) 3 times (2000,2002)    MR (mitral regurgitation) moderate to severe    Raynaud's disease     Scleroderma     Sleep apnea uses oral plate    Ventricular tachycardia

## 2022-06-23 NOTE — ED ADULT NURSE NOTE - NSICDXPASTSURGICALHX_GEN_ALL_CORE_FT
PAST SURGICAL HISTORY:  Abnormality of left atrial appendage s/p Lariat 2012    AICD (automatic cardioverter/defibrillator) present [employer] (2006)    Bariatric surgery status gastric bypass 2001    Hernia x2    History of breast surgery "Lift"    Spinal cord stimulator status [employer] (2014) recent battery change

## 2022-06-23 NOTE — ED PROVIDER NOTE - NSICDXFAMILYHX_GEN_ALL_CORE_FT
FAMILY HISTORY:  Sibling  Still living? Unknown  Family history of heart disease, Age at diagnosis: Age Unknown    Child  Still living? Yes, Estimated age: Age Unknown  Family history of heart disease, Age at diagnosis: Age Unknown  Family history of heart valve abnormality, Age at diagnosis: Age Unknown

## 2022-06-23 NOTE — ED PROVIDER NOTE - NSFOLLOWUPINSTRUCTIONS_ED_ALL_ED_FT
Quarantine for 5 days  Tylenol, for fever/pain  Continue your regular medications as prescribed  Follow up with your doctor when you return home  Return sooner for any problems

## 2022-06-23 NOTE — ED PROVIDER NOTE - NSICDXPASTSURGICALHX_GEN_ALL_CORE_FT
PAST SURGICAL HISTORY:  Abnormality of left atrial appendage s/p Lariat 2012    AICD (automatic cardioverter/defibrillator) present [employer] (2006)    Bariatric surgery status gastric bypass 2001    Hernia x2    History of breast surgery "Lift"    Spinal cord stimulator status [employer] (2014) recent battery change

## 2022-06-23 NOTE — ED PROVIDER NOTE - PROGRESS NOTE DETAILS
MD Peggy: pt states that she had a compression fracture in lower back about 3 weeks ago and is still having back pain. CT lumbar spine ordered to evaluate for any cord involvement. Pt states she cannot have MR as she has pacemaker in place. She also states that she will not go to rehab if evaluated by PT, and pt's daughter at bedside states that she has a wheelchair at home but pt she has stairs to get into the house. Pt COVID+ despite be vaccinated and boosted  x2.  She is up from [place] fo grandchild's graduation and requesting d/c and will quarantine at daughter's home

## 2022-06-23 NOTE — ED PROVIDER NOTE - PHYSICAL EXAMINATION
General: elderly woman in no acute distress  Head: normocephalic, atraumatic  Eyes: PERRL, EOMI  Mouth: moist mucous membranes  Neck: supple neck  CV: normal rate and rhythm, no LE edema, peripheral pulses 2+ bilateral UE and LE  Respiratory: clear to auscultation bilaterally  Abdomen: soft, nondistended, nontender  : no suprapubic tenderness, no CVAT  MSK: no joint deformities  Neuro: alert and oriented x3, speech clear, CN III-XII intact, 5/5 strength in distal UE bilaterally (unable to range shoulders at baseline), 4+/5 strength, equal in bilateral LE, no dysmetria  Skin: no rash noted

## 2022-06-23 NOTE — ED ADULT TRIAGE NOTE - SPO2 (%)
Health Maintenance Due   Topic Date Due   • Shingles Vaccine (2 of 3) 10/20/2011       Patient is due for the topics as listed above and wishes to proceed with them. Orders placed for Immunization(s) Shingles      Unaddressed Risk Adjusted HCC Categories and Diagnoses  HCC 12 - Breast, Prostate, Other Cancers & Tumors   Unaddressed Dx:Malignant Neoplasm Of Female Breast (Cms/Hcc)  HCC 47 - Disorders of Immunity   Unaddressed Dx:Drug Induced Neutropenia(288.03)  HCC 79 - Seizure Disorders and Convulsions   Unaddressed Dx:Generalized Convulsive Epilepsy (Cms/Hcc)         98

## 2022-06-23 NOTE — ED PROVIDER NOTE - PATIENT PORTAL LINK FT
You can access the FollowMyHealth Patient Portal offered by [facility] by registering at the following website: [email or web address]. By joining [employer]’s FollowMyHealth portal, you will also be able to view your health information using other applications (apps) compatible with our system.

## 2022-06-23 NOTE — ED PROVIDER NOTE - CLINICAL SUMMARY MEDICAL DECISION MAKING FREE TEXT BOX
76yoF presents with unsteadiness and inability to walk at around 3pm with LKN at 8am but no focal neurologic deficits on exam. Low suspicion for CVA but code stroke was called with no bleed in CT. Pt is outside of window for TPA and no measurable deficit. Will check for metabolic/infectious abnormalities. Blood work, rvp, urine, xr, EKG.

## 2022-06-23 NOTE — ED PROVIDER NOTE - NS ED ROS FT
General: no fever  Head: no headache  Eyes: no vision change  ENT: no nasal discharge/congestion  CV: no chest pain  Resp: no SOB, no cough  GI: no N/V/D, no abdominal pain  : no dysuria  MSK: +bilateral shoulder pain, +left wrist pain  Skin: no new rash  Neuro: +unsteadiness, no focal weakness, no change in sensation

## 2022-06-23 NOTE — ED ADULT NURSE NOTE - OBJECTIVE STATEMENT
Patient is alert and oriented X4 from home answering all questions  appropriate. Patient able to move all 4 extremities. Patient states she has an compression fx in her back as well as chronic pain to the left arm.

## 2022-06-23 NOTE — ED ADULT NURSE NOTE - NSIMPLEMENTINTERV_GEN_ALL_ED
Implemented All Fall Risk Interventions:  [place] to call system. Call bell, personal items and telephone within reach. Instruct patient to call for assistance. Room bathroom lighting operational. Non-slip footwear when patient is off stretcher. Physically safe environment: no spills, clutter or unnecessary equipment. Stretcher in lowest position, wheels locked, appropriate side rails in place. Provide visual cue, wrist band, yellow gown, etc. Monitor gait and stability. Monitor for mental status changes and reorient to person, place, and time. Review medications for side effects contributing to fall risk. Reinforce activity limits and safety measures with patient and family.

## 2022-06-23 NOTE — ED PROVIDER NOTE - OBJECTIVE STATEMENT
77yo woman PMH HTN, hypothyroidism, MV repair, CAD, afib not on a/c, CHF, raynauds was brought in for unsteadiness noted at 3pm when pt's daughter tried to get her up to stand. Pt was last seen normal at around 8am this morning and has not walked since. Pt denies headache, change in vision or speech, drooling, facial droop, focal weakness or paresthesia/numbness in extremities. No recent fever, cough, n/v/d, abdominal pain, dysuria. No chest pain or SOB. 77yo woman PMH HTN, hypothyroidism, MV repair, CAD, afib not on a/c, CHF, raynauds was brought in for unsteadiness noted at 3pm when pt's daughter tried to get her up to stand. Pt was last seen normal at around 8am this morning and has not walked since. Pt denies headache, change in vision or speech, drooling, facial droop, focal weakness or paresthesia/numbness in extremities. No recent fever, cough, n/v/d, abdominal pain, dysuria. No chest pain or SOB.  Pt recently had bilateral shoulder surgery and has difficulty ranging at baseline.

## 2022-06-23 NOTE — ED ADULT TRIAGE NOTE - CHIEF COMPLAINT QUOTE
pt presents to ED with family who are historian and reports pt "disoriented today, unable to walk all of a sudden and cant form proper sentences". last known well "estimated" to be last.   BLE edema w/ discoloration to both feet hx raynauds.

## 2022-06-24 VITALS
DIASTOLIC BLOOD PRESSURE: 67 MMHG | OXYGEN SATURATION: 100 % | TEMPERATURE: 99 F | SYSTOLIC BLOOD PRESSURE: 140 MMHG | RESPIRATION RATE: 18 BRPM | HEART RATE: 75 BPM

## 2022-06-24 LAB
BASE EXCESS BLDV CALC-SCNC: 2.7 MMOL/L — SIGNIFICANT CHANGE UP (ref -2–3)
CA-I SERPL-SCNC: 1.11 MMOL/L — LOW (ref 1.15–1.33)
CHLORIDE BLDV-SCNC: 107 MMOL/L — SIGNIFICANT CHANGE UP (ref 98–107)
GAS PNL BLDV: 140 MMOL/L — SIGNIFICANT CHANGE UP (ref 136–145)
GAS PNL BLDV: SIGNIFICANT CHANGE UP
GAS PNL BLDV: SIGNIFICANT CHANGE UP
GLUCOSE BLDV-MCNC: 90 MG/DL — SIGNIFICANT CHANGE UP (ref 70–99)
HCO3 BLDV-SCNC: 28 MMOL/L — SIGNIFICANT CHANGE UP (ref 22–29)
HCT VFR BLDA CALC: 32 % — SIGNIFICANT CHANGE UP
HGB BLD CALC-MCNC: 10.8 G/DL — LOW (ref 11.7–16.1)
LACTATE BLDV-MCNC: 1.7 MMOL/L — SIGNIFICANT CHANGE UP (ref 0.5–2)
PCO2 BLDV: 47 MMHG — HIGH (ref 39–42)
PH BLDV: 7.38 — SIGNIFICANT CHANGE UP (ref 7.32–7.43)
PO2 BLDV: 72 MMHG — HIGH (ref 25–45)
POTASSIUM BLDV-SCNC: 3.5 MMOL/L — SIGNIFICANT CHANGE UP (ref 3.5–5.1)
SAO2 % BLDV: 95.2 % — SIGNIFICANT CHANGE UP

## 2022-06-24 PROCEDURE — 82550 ASSAY OF CK (CPK): CPT

## 2022-06-24 PROCEDURE — 71045 X-RAY EXAM CHEST 1 VIEW: CPT

## 2022-06-24 PROCEDURE — 84132 ASSAY OF SERUM POTASSIUM: CPT

## 2022-06-24 PROCEDURE — 82435 ASSAY OF BLOOD CHLORIDE: CPT

## 2022-06-24 PROCEDURE — 82803 BLOOD GASES ANY COMBINATION: CPT

## 2022-06-24 PROCEDURE — 83605 ASSAY OF LACTIC ACID: CPT

## 2022-06-24 PROCEDURE — 84295 ASSAY OF SERUM SODIUM: CPT

## 2022-06-24 PROCEDURE — 80053 COMPREHEN METABOLIC PANEL: CPT

## 2022-06-24 PROCEDURE — 36415 COLL VENOUS BLD VENIPUNCTURE: CPT

## 2022-06-24 PROCEDURE — 72131 CT LUMBAR SPINE W/O DYE: CPT | Mod: MA

## 2022-06-24 PROCEDURE — 99285 EMERGENCY DEPT VISIT HI MDM: CPT | Mod: 25

## 2022-06-24 PROCEDURE — 84484 ASSAY OF TROPONIN QUANT: CPT

## 2022-06-24 PROCEDURE — 70450 CT HEAD/BRAIN W/O DYE: CPT | Mod: MA

## 2022-06-24 PROCEDURE — 85025 COMPLETE CBC W/AUTO DIFF WBC: CPT

## 2022-06-24 PROCEDURE — 0225U NFCT DS DNA&RNA 21 SARSCOV2: CPT

## 2022-06-24 PROCEDURE — 93005 ELECTROCARDIOGRAM TRACING: CPT

## 2022-06-24 PROCEDURE — 85730 THROMBOPLASTIN TIME PARTIAL: CPT

## 2022-06-24 PROCEDURE — 82330 ASSAY OF CALCIUM: CPT

## 2022-06-24 PROCEDURE — 85018 HEMOGLOBIN: CPT

## 2022-06-24 PROCEDURE — 81001 URINALYSIS AUTO W/SCOPE: CPT

## 2022-06-24 PROCEDURE — 85014 HEMATOCRIT: CPT

## 2022-06-24 PROCEDURE — 82947 ASSAY GLUCOSE BLOOD QUANT: CPT

## 2022-06-24 PROCEDURE — 85610 PROTHROMBIN TIME: CPT

## 2022-06-24 NOTE — PROVIDER CONTACT NOTE (OTHER) - ASSESSMENT
Chart reviewed, contents noted. Pt unable to be seen at this time as unable to locate pt.   PT will reattempt later in day if schedule permits.

## 2022-06-25 NOTE — ED POST DISCHARGE NOTE - DETAILS
symptomatic improvement s/p covid infection. advised on results and need for fu with PCP for repeat chest xray, no chest pain or sob

## 2022-06-27 NOTE — PROGRESS NOTE ADULT - PROVIDER SPECIALTY LIST ADULT
CT Surgery Odomzo Counseling- I discussed with the patient the risks of Odomzo including but not limited to nausea, vomiting, diarrhea, constipation, weight loss, changes in the sense of taste, decreased appetite, muscle spasms, and hair loss.  The patient verbalized understanding of the proper use and possible adverse effects of Odomzo.  All of the patient's questions and concerns were addressed.

## 2023-07-19 NOTE — DISCHARGE NOTE ADULT - LAUNCH MEDICATION RECONCILIATION
Paramedian Forehead Flap Text: A decision was made to reconstruct the defect utilizing an interpolation axial flap and a staged reconstruction.  A telfa template was made of the defect.  This telfa template was then used to outline the paramedian forehead pedicle flap.  The donor area for the pedicle flap was then injected with anesthesia.  The flap was excised through the skin and subcutaneous tissue down to the layer of the underlying musculature.  The pedicle flap was carefully excised within this deep plane to maintain its blood supply.  The edges of the donor site were undermined.   The donor site was closed in a primary fashion.  The pedicle was then rotated into position and sutured.  Once the tube was sutured into place, adequate blood supply was confirmed with blanching and refill.  The pedicle was then wrapped with xeroform gauze and dressed appropriately with a telfa and gauze bandage to ensure continued blood supply and protect the attached pedicle. <<-----Click here for Discharge Medication Review

## 2024-06-23 NOTE — H&P PST ADULT - MALLAMPATI CLASS
Alert and oriented, no focal deficits, no motor or sensory deficits.
Class III - visualization of the soft palate and the base of the uvula

## 2024-12-15 NOTE — ED PROVIDER NOTE - NS ED MD DISPO DISCHARGE CCDA
Benefits, risks, and possible complications of procedure explained to patient/caregiver who verbalized understanding and gave written consent.
Patient/Caregiver provided printed discharge information.

## 2025-01-08 NOTE — ED ADULT NURSE NOTE - IN THE PAST YEAR, HOW OFTEN HAVE YOU USED TOBACCO PRODUCTS?
1029 To recovery via cart. Spont resp. VSS. Report received from surgical rn and CRNA. IV infusing KVO. Ace wrap and stockinet clean and intact to left lower leg. Surgical boot clean and intact to left lower leg. Left lower leg elevated on pillow. Denies pain or nausea  1035 Remains stable  1040 Unchanged   1045 Comfortable. Denies needs. O2 on at 2 L per n/c  1050 Unchanged and stable. HOB elevated  1055 Stable  1100 Meets criteria for transfer to phase II recovery care. Transfer via cart.  1105 In phase II recovery. O2 continues at 2L per N/C. Snack and drink given. Call bell in reach. Daughter and wife in room  1135 O2 discontinued 94% on room air. IV discontinued. Up to dress with assist.   1200 Discharge to home in stable condition per wheelchair with family       Never